# Patient Record
Sex: MALE | Race: OTHER | HISPANIC OR LATINO | Employment: UNEMPLOYED | URBAN - METROPOLITAN AREA
[De-identification: names, ages, dates, MRNs, and addresses within clinical notes are randomized per-mention and may not be internally consistent; named-entity substitution may affect disease eponyms.]

---

## 2018-09-01 ENCOUNTER — HOSPITAL ENCOUNTER (EMERGENCY)
Facility: HOSPITAL | Age: 48
End: 2018-09-01
Attending: EMERGENCY MEDICINE | Admitting: EMERGENCY MEDICINE
Payer: COMMERCIAL

## 2018-09-01 VITALS
RESPIRATION RATE: 16 BRPM | DIASTOLIC BLOOD PRESSURE: 84 MMHG | OXYGEN SATURATION: 99 % | HEART RATE: 60 BPM | TEMPERATURE: 98.1 F | SYSTOLIC BLOOD PRESSURE: 126 MMHG

## 2018-09-01 DIAGNOSIS — R45.851 SUICIDAL IDEATION: ICD-10-CM

## 2018-09-01 DIAGNOSIS — F19.10 DRUG ABUSE (HCC): ICD-10-CM

## 2018-09-01 DIAGNOSIS — F32.A DEPRESSION: Primary | ICD-10-CM

## 2018-09-01 LAB
AMPHETAMINES SERPL QL SCN: NEGATIVE
BARBITURATES UR QL: NEGATIVE
BENZODIAZ UR QL: NEGATIVE
COCAINE UR QL: POSITIVE
ETHANOL EXG-MCNC: 0 MG/DL
METHADONE UR QL: NEGATIVE
OPIATES UR QL SCN: POSITIVE
PCP UR QL: NEGATIVE
THC UR QL: POSITIVE

## 2018-09-01 PROCEDURE — 80307 DRUG TEST PRSMV CHEM ANLYZR: CPT

## 2018-09-01 PROCEDURE — 99285 EMERGENCY DEPT VISIT HI MDM: CPT

## 2018-09-01 PROCEDURE — 82075 ASSAY OF BREATH ETHANOL: CPT

## 2018-09-01 RX ORDER — RISPERIDONE 1 MG/1
1 TABLET, ORALLY DISINTEGRATING ORAL ONCE
Status: COMPLETED | OUTPATIENT
Start: 2018-09-01 | End: 2018-09-01

## 2018-09-01 RX ORDER — METHOCARBAMOL 500 MG/1
500 TABLET, FILM COATED ORAL ONCE
Status: COMPLETED | OUTPATIENT
Start: 2018-09-01 | End: 2018-09-01

## 2018-09-01 RX ORDER — FLUOXETINE 10 MG/1
20 CAPSULE ORAL DAILY
Status: DISCONTINUED | OUTPATIENT
Start: 2018-09-01 | End: 2018-09-01 | Stop reason: HOSPADM

## 2018-09-01 RX ORDER — METHOCARBAMOL 500 MG/1
500 TABLET, FILM COATED ORAL 4 TIMES DAILY
COMMUNITY
End: 2019-09-22 | Stop reason: ALTCHOICE

## 2018-09-01 RX ORDER — FLUOXETINE HYDROCHLORIDE 20 MG/1
20 CAPSULE ORAL DAILY
COMMUNITY
End: 2019-09-25 | Stop reason: HOSPADM

## 2018-09-01 RX ADMIN — RISPERIDONE 1 MG: 1 TABLET, ORALLY DISINTEGRATING ORAL at 02:00

## 2018-09-01 RX ADMIN — FLUOXETINE 20 MG: 10 CAPSULE ORAL at 10:01

## 2018-09-01 RX ADMIN — METHOCARBAMOL 500 MG: 500 TABLET ORAL at 10:01

## 2018-09-01 NOTE — ED NOTES
Insurance Authorization: [facility]  Phone call placed to [facility]  Phone number: [phone]  Spoke to JEN  3 days approved  Level of care: Inpatient psych  Review on 9/4/18  Authorization # na  EVS done and patient is eligible with Physical and Behavioral managed by managed care

## 2018-09-01 NOTE — ED NOTES
Patient has been moved to room 12  Patient belongings have been placed and locked in locker 12  Patient requesting apple juice and a blanket  Requests granted  Patient is resting comfortably in bed at this time         Lebron Boas, RN  09/01/18 0712

## 2018-09-01 NOTE — ED NOTES
Patient requesting food and drink  Patient provided with sandwich, pretzels and juice       Troy Deng RN  09/01/18 2330

## 2018-09-01 NOTE — ED NOTES
Patient instructed to urinate at this time  Patient gave sample       Lata Coombs RN  09/01/18 Dmitry Cooney RN  09/01/18 8847

## 2018-09-01 NOTE — EMTALA/ACUTE CARE TRANSFER
[facility]  [address] [id number]  Dept: [phone]      EMTALA TRANSFER CONSENT    NAME John Reveles                                         1970                              MRN [id number]    I have been informed of my rights regarding examination, treatment, and transfer   by Dr Brittany Kwan, *    Benefits: Continuity of care    Risks: Potential for delay in receiving treatment, Increased discomfort during transfer      Consent for Transfer:  I acknowledge that my medical condition has been evaluated and explained to me by the emergency department physician or other qualified medical person and/or my attending physician, who has recommended that I be transferred to the service of  Accepting Physician: Dr Marcial Fearing  at [address], [address] : [place]  The above potential benefits of such transfer, the potential risks associated with such transfer, and the probable risks of not being transferred have been explained to me, and I fully understand them  The doctor has explained that, in my case, the benefits of transfer outweigh the risks  I agree to be transferred  I authorize the performance of emergency medical procedures and treatments upon me in both transit and upon arrival at the receiving facility  Additionally, I authorize the release of any and all medical records to the receiving facility and request they be transported with me, if possible  I understand that the safest mode of transportation during a medical emergency is an ambulance and that the Hospital advocates the use of this mode of transport  Risks of traveling to the receiving facility by car, including absence of medical control, life sustaining equipment, such as oxygen, and medical personnel has been explained to me and I fully understand them      (NIDIA CORRECT BOX BELOW)  [  ]  I consent to the stated transfer and to be transported by ambulance/helicopter  [  ]  I consent to the stated transfer, but refuse transportation by ambulance and accept full responsibility for my transportation by car  I understand the risks of non-ambulance transfers and I exonerate the Hospital and its staff from any deterioration in my condition that results from this refusal     X___________________________________________    DATE  18  TIME________  Signature of patient or legally responsible individual signing on patient behalf           RELATIONSHIP TO PATIENT_________________________          Provider Certification    NAME Darby Cummings                                         1970                              MRN [id number]    A medical screening exam was performed on the above named patient  Based on the examination:    Condition Necessitating Transfer The primary encounter diagnosis was Depression  Diagnoses of Suicidal ideation and Drug abuse were also pertinent to this visit  Patient Condition: The patient has been stabilized such that within reasonable medical probability, no material deterioration of the patient condition or the condition of the unborn child(moy) is likely to result from the transfer    Reason for Transfer: Level of Care needed not available at this facility    Transfer Requirements: [address]   · [facility] available and qualified personnel available for treatment as acknowledged by    · Agreed to accept transfer and to provide appropriate medical treatment as acknowledged by       Dr Tommy Galeano   · Appropriate medical records of the examination and treatment of the patient are provided at the time of transfer   [address], [address] _______  · Transfer will be performed by qualified personnel from [facility]   and appropriate transfer equipment as required, including the use of necessary and appropriate life support measures      Provider Certification: I have examined the patient and explained the following risks and benefits of being transferred/refusing transfer to the patient/family:  General risk, such as traffic hazards, adverse weather conditions, rough terrain or turbulence, possible failure of equipment (including vehicle or aircraft), or consequences of actions of persons outside the control of the transport personnel      Based on these reasonable risks and benefits to the patient and/or the unborn child(moy), and based upon the information available at the time of the patients examination, I certify that the medical benefits reasonably to be expected from the provision of appropriate medical treatments at another medical facility outweigh the increasing risks, if any, to the individuals medical condition, and in the case of labor to the unborn child, from effecting the transfer      X____________________________________________ DATE 09/01/18        TIME_______      ORIGINAL - SEND TO MEDICAL RECORDS   COPY - SEND WITH PATIENT DURING TRANSFER

## 2018-09-01 NOTE — ED NOTES
Patients Accepted to [facility]   Accepting Doctor is Dr Jenniffer Traylor by [employer] @ 6:30pm

## 2018-09-01 NOTE — ED NOTES
Pt awake, asking for change of TV channel  Breakfast tray ordered for pt        Augie Matthews, RN  09/01/18 84 Sandra Humphrey RN  09/01/18 7864

## 2018-09-01 NOTE — ED PROVIDER NOTES
History  Chief Complaint   Patient presents with    Psychiatric Evaluation     pt reports he is stresssed and depressed and feels like killing himself, does not have a plan  states his fiance and son left him and it is hard for him to handle  denies HI, states he hears voices that tell him to kill himself  also states he used crack and took suboxone today  Patient presents for depression and suicidal thoughts  He reported hallucinations in triage but denied him for me currently  He states that he has a history of psychiatric disorders and he is under the care of a psychiatrist and outpatient therapy  He states that he has been taking his medications  States that he is currently going through a tough time at home  He states that his fiancee took his 3year-old and brought him to [facility]  He states that there is no direct custody of the child  It appears to be shared  He called the police but there is nothing that could be done at this time  He states that he has been struggling with this over the past few days and is now becoming more depressed and wants to harm himself  The patient states that he has a plan to throw himself out into traffic  History provided by:  Patient   used: No    Psychiatric Evaluation   Presenting symptoms: depression and suicidal thoughts    Presenting symptoms: no agitation and no self-mutilation    Degree of incapacity (severity): Moderate  Onset quality:  Gradual  Duration:  5 days  Timing:  Constant  Progression:  Worsening  Context: alcohol use, drug abuse and stressful life event    Treatment compliance: All of the time  Relieved by:  None tried  Worsened by:  Family interactions  Ineffective treatments:  None tried  Associated symptoms: feelings of worthlessness    Associated symptoms: no headaches    Risk factors: hx of mental illness        Prior to Admission Medications   Prescriptions Last Dose Informant Patient Reported? Taking? FLUoxetine (PROzac) 20 mg capsule   Yes Yes   Sig: Take 20 mg by mouth daily   RISPERIDONE PO   Yes Yes   Sig: Take 1 mg by mouth daily at bedtime   methocarbamol (ROBAXIN) 500 mg tablet   Yes Yes   Sig: Take 500 mg by mouth 4 (four) times a day      Facility-Administered Medications: None       History reviewed  No pertinent past medical history  Past Surgical History:   Procedure Laterality Date    ELBOW SURGERY         History reviewed  No pertinent family history  I have reviewed and agree with the history as documented  Social History   Substance Use Topics    Smoking status: Current Every Day Smoker     Packs/day: 0 50    Smokeless tobacco: Not on file    Alcohol use No        Review of Systems   Skin: Negative for color change, pallor, rash and wound  Allergic/Immunologic: Negative for immunocompromised state  Neurological: Negative for headaches  Psychiatric/Behavioral: Positive for suicidal ideas  Negative for agitation and self-injury  All other systems reviewed and are negative  Physical Exam  Physical Exam   Constitutional: He is oriented to person, place, and time  He appears well-developed and well-nourished  No distress  HENT:   Head: Normocephalic and atraumatic  Mouth/Throat: Oropharynx is clear and moist    Eyes: Conjunctivae and EOM are normal  Pupils are equal, round, and reactive to light  Right eye exhibits no discharge  Left eye exhibits no discharge  No scleral icterus  Cardiovascular: Normal rate, regular rhythm, normal heart sounds and intact distal pulses  Exam reveals no friction rub  No murmur heard  Pulmonary/Chest: Effort normal and breath sounds normal  No stridor  No respiratory distress  He has no wheezes  He has no rales  Musculoskeletal: He exhibits no edema, tenderness or deformity  Neurological: He is alert and oriented to person, place, and time  Skin: Skin is warm and dry  He is not diaphoretic     Psychiatric: He has a normal mood and affect  His speech is normal and behavior is normal  He is not actively hallucinating  Thought content is not paranoid and not delusional  Cognition and memory are not impaired  He expresses suicidal ideation  He expresses no homicidal ideation  He expresses suicidal plans  He expresses no homicidal plans  He is attentive  Nursing note and vitals reviewed  Vital Signs  ED Triage Vitals   Temperature Pulse Respirations Blood Pressure SpO2   09/01/18 0059 09/01/18 0059 09/01/18 0059 09/01/18 0101 09/01/18 0059   (!) 97 4 °F (36 3 °C) 99 16 147/88 98 %      Temp Source Heart Rate Source Patient Position - Orthostatic VS BP Location FiO2 (%)   09/01/18 0059 09/01/18 0059 09/01/18 0059 09/01/18 0059 --   Temporal Monitor Sitting Right arm       Pain Score       --                  Vitals:    09/01/18 0059 09/01/18 0101   BP:  147/88   Pulse: 99    Patient Position - Orthostatic VS: Sitting        Visual Acuity      ED Medications  Medications   risperiDONE (RisperDAL M-TABS) dispersible tablet 1 mg (1 mg Oral Given 9/1/18 0200)       Diagnostic Studies  Results Reviewed     Procedure Component Value Units Date/Time    Rapid drug screen, urine [[id number]]  (Abnormal) Collected:  09/01/18 0233    Lab Status:  Final result Specimen:  Urine from Urine, Other Updated:  09/01/18 0253     Amph/Meth UR Negative     Barbiturate Ur Negative     Benzodiazepine Urine Negative     Cocaine Urine Positive (A)     Methadone Urine Negative     Opiate Urine Positive (A)     PCP Ur Negative     THC Urine Positive (A)    Narrative:         Presumptive report  If requested, specimen will be sent to reference lab for confirmation  FOR MEDICAL PURPOSES ONLY  IF CONFIRMATION NEEDED PLEASE CONTACT THE LAB WITHIN 5 DAYS      Drug Screen Cutoff Levels:  AMPHETAMINE/METHAMPHETAMINES  1000 ng/mL  BARBITURATES     200 ng/mL  BENZODIAZEPINES     200 ng/mL  COCAINE      300 ng/mL  METHADONE      300 ng/mL  OPIATES      300 ng/mL  PHENCYCLIDINE     25 ng/mL  THC       50 ng/mL    POCT alcohol breath test [[id number]]  (Normal) Resulted:  09/01/18 0121    Lab Status:  Final result Updated:  09/01/18 0121     EXTBreath Alcohol 0 000                 No orders to display              Procedures  Procedures       Phone Contacts  ED Phone Contact    ED Course                               MDM  Number of Diagnoses or Management Options  Depression: new and requires workup  Drug abuse: new and requires workup  Suicidal ideation: new and requires workup  Diagnosis management comments: 1:43 AM  Patient is medically cleared  Will have crisis evaluate  Amount and/or Complexity of Data Reviewed  Clinical lab tests: ordered and reviewed  Tests in the medicine section of CPT®: reviewed and ordered  Review and summarize past medical records: yes    Patient Progress  Patient progress: stable    CritCare Time    Disposition  Final diagnoses:   Depression   Suicidal ideation   Drug abuse     Time reflects when diagnosis was documented in both MDM as applicable and the Disposition within this note     Time User Action Codes Description Comment    9/1/2018  3:41 AM Kaya Mckeon Add [F32 9] Depression     9/1/2018  3:41 AM Kaya Mckeon Add [R45 851] Suicidal ideation     9/1/2018  3:41 AM Gee Gomez Add [F19 10] Drug abuse       ED Disposition     ED Disposition Condition Comment    Transfer to Another [facility] [address] should be transferred out to psychiatric facility  Follow-up Information    None         Patient's Medications   Discharge Prescriptions    No medications on file     No discharge procedures on file      ED Provider  Electronically Signed by           Anabela Bennett DO  09/01/18 4306

## 2019-09-22 ENCOUNTER — APPOINTMENT (EMERGENCY)
Dept: CT IMAGING | Facility: HOSPITAL | Age: 49
DRG: 720 | End: 2019-09-22
Payer: COMMERCIAL

## 2019-09-22 ENCOUNTER — HOSPITAL ENCOUNTER (INPATIENT)
Facility: HOSPITAL | Age: 49
LOS: 3 days | Discharge: HOME | DRG: 720 | End: 2019-09-25
Attending: EMERGENCY MEDICINE | Admitting: FAMILY MEDICINE
Payer: COMMERCIAL

## 2019-09-22 ENCOUNTER — APPOINTMENT (EMERGENCY)
Dept: RADIOLOGY | Facility: HOSPITAL | Age: 49
DRG: 720 | End: 2019-09-22
Payer: COMMERCIAL

## 2019-09-22 DIAGNOSIS — R50.9 FEVER: Primary | ICD-10-CM

## 2019-09-22 DIAGNOSIS — F11.20 HEROIN USE DISORDER, SEVERE, DEPENDENCE (HCC): ICD-10-CM

## 2019-09-22 DIAGNOSIS — J18.9 LUNG INFECTION: ICD-10-CM

## 2019-09-22 DIAGNOSIS — F11.10 HEROIN ABUSE (HCC): ICD-10-CM

## 2019-09-22 DIAGNOSIS — B19.20 HEPATITIS C: ICD-10-CM

## 2019-09-22 DIAGNOSIS — L03.114 CELLULITIS OF LEFT FOREARM: ICD-10-CM

## 2019-09-22 DIAGNOSIS — F19.10 SUBSTANCE ABUSE (HCC): ICD-10-CM

## 2019-09-22 PROBLEM — M54.9 CHRONIC BILATERAL BACK PAIN: Status: ACTIVE | Noted: 2017-06-05

## 2019-09-22 PROBLEM — F19.239: Status: ACTIVE | Noted: 2019-09-22

## 2019-09-22 PROBLEM — G89.29 CHRONIC BILATERAL BACK PAIN: Status: ACTIVE | Noted: 2017-06-05

## 2019-09-22 PROBLEM — G47.30 SLEEP APNEA: Status: ACTIVE | Noted: 2017-06-05

## 2019-09-22 PROBLEM — B18.2 CHRONIC HEPATITIS C WITHOUT HEPATIC COMA (HCC): Chronic | Status: ACTIVE | Noted: 2017-06-05

## 2019-09-22 PROBLEM — F19.939: Status: ACTIVE | Noted: 2019-09-22

## 2019-09-22 PROBLEM — K08.9 DENTAL DISEASE: Status: ACTIVE | Noted: 2017-06-05

## 2019-09-22 PROBLEM — K74.00 FIBROSIS OF LIVER: Status: ACTIVE | Noted: 2017-09-13

## 2019-09-22 PROBLEM — F33.1 MODERATE EPISODE OF RECURRENT MAJOR DEPRESSIVE DISORDER (HCC): Status: ACTIVE | Noted: 2017-09-05

## 2019-09-22 PROBLEM — G89.29 CHRONIC BILATERAL BACK PAIN: Chronic | Status: ACTIVE | Noted: 2017-06-05

## 2019-09-22 PROBLEM — B18.2 CHRONIC HEPATITIS C WITHOUT HEPATIC COMA (HCC): Status: ACTIVE | Noted: 2017-06-05

## 2019-09-22 PROBLEM — Z59.9 FINANCIAL PROBLEMS: Chronic | Status: ACTIVE | Noted: 2017-06-05

## 2019-09-22 PROBLEM — K74.00 FIBROSIS OF LIVER: Chronic | Status: ACTIVE | Noted: 2017-09-13

## 2019-09-22 PROBLEM — F33.1 MODERATE EPISODE OF RECURRENT MAJOR DEPRESSIVE DISORDER (HCC): Chronic | Status: ACTIVE | Noted: 2017-09-05

## 2019-09-22 PROBLEM — Z59.9 FINANCIAL PROBLEMS: Status: ACTIVE | Noted: 2017-06-05

## 2019-09-22 PROBLEM — T73.2XXA FATIGUE DUE TO EXPOSURE: Chronic | Status: ACTIVE | Noted: 2017-06-05

## 2019-09-22 PROBLEM — A41.9 SEPSIS (HCC): Status: ACTIVE | Noted: 2019-09-22

## 2019-09-22 PROBLEM — M54.9 CHRONIC BILATERAL BACK PAIN: Chronic | Status: ACTIVE | Noted: 2017-06-05

## 2019-09-22 PROBLEM — K08.9 DENTAL DISEASE: Chronic | Status: ACTIVE | Noted: 2017-06-05

## 2019-09-22 PROBLEM — F11.11 HEROIN USE DISORDER, MILD, IN SUSTAINED REMISSION (HCC): Chronic | Status: ACTIVE | Noted: 2017-08-16

## 2019-09-22 PROBLEM — F11.11 HEROIN USE DISORDER, MILD, IN SUSTAINED REMISSION (HCC): Status: ACTIVE | Noted: 2017-08-16

## 2019-09-22 PROBLEM — G47.30 SLEEP APNEA: Chronic | Status: ACTIVE | Noted: 2017-06-05

## 2019-09-22 PROBLEM — T73.2XXA FATIGUE DUE TO EXPOSURE: Status: ACTIVE | Noted: 2017-06-05

## 2019-09-22 LAB
ALBUMIN SERPL BCP-MCNC: 4.1 G/DL (ref 3–5.2)
ALP SERPL-CCNC: 75 U/L (ref 43–122)
ALT SERPL W P-5'-P-CCNC: 16 U/L (ref 9–52)
AMPHETAMINES SERPL QL SCN: NEGATIVE
ANION GAP SERPL CALCULATED.3IONS-SCNC: 8 MMOL/L (ref 5–14)
AST SERPL W P-5'-P-CCNC: 23 U/L (ref 17–59)
ATRIAL RATE: 88 BPM
BACTERIA UR QL AUTO: ABNORMAL /HPF
BARBITURATES UR QL: NEGATIVE
BASOPHILS # BLD AUTO: 0.1 THOUSANDS/ΜL (ref 0–0.1)
BASOPHILS NFR BLD AUTO: 1 % (ref 0–1)
BENZODIAZ UR QL: NEGATIVE
BILIRUB SERPL-MCNC: 2.1 MG/DL
BILIRUB UR QL STRIP: ABNORMAL
BUN SERPL-MCNC: 16 MG/DL (ref 5–25)
CALCIUM SERPL-MCNC: 9 MG/DL (ref 8.4–10.2)
CHLORIDE SERPL-SCNC: 98 MMOL/L (ref 97–108)
CK SERPL-CCNC: 63 U/L (ref 55–170)
CLARITY UR: CLEAR
CO2 SERPL-SCNC: 29 MMOL/L (ref 22–30)
COCAINE UR QL: NEGATIVE
COLOR UR: ABNORMAL
CREAT SERPL-MCNC: 0.95 MG/DL (ref 0.7–1.5)
EOSINOPHIL # BLD AUTO: 0.1 THOUSAND/ΜL (ref 0–0.4)
EOSINOPHIL NFR BLD AUTO: 0 % (ref 0–6)
ERYTHROCYTE [DISTWIDTH] IN BLOOD BY AUTOMATED COUNT: 13.2 %
ETHANOL SERPL-MCNC: <10 MG/DL (ref 0–10)
GFR SERPL CREATININE-BSD FRML MDRD: 95 ML/MIN/1.73SQ M
GLUCOSE SERPL-MCNC: 121 MG/DL (ref 70–99)
GLUCOSE UR STRIP-MCNC: NEGATIVE MG/DL
HCT VFR BLD AUTO: 47.2 % (ref 41–53)
HGB BLD-MCNC: 16.2 G/DL (ref 13.5–17.5)
HGB UR QL STRIP.AUTO: NEGATIVE
KETONES UR STRIP-MCNC: ABNORMAL MG/DL
LACTATE SERPL-SCNC: 1.6 MMOL/L (ref 0.7–2)
LEUKOCYTE ESTERASE UR QL STRIP: NEGATIVE
LIPASE SERPL-CCNC: 25 U/L (ref 23–300)
LYMPHOCYTES # BLD AUTO: 1.9 THOUSANDS/ΜL (ref 0.5–4)
LYMPHOCYTES NFR BLD AUTO: 15 % (ref 25–45)
MAGNESIUM SERPL-MCNC: 1.9 MG/DL (ref 1.6–2.3)
MCH RBC QN AUTO: 31.9 PG (ref 26–34)
MCHC RBC AUTO-ENTMCNC: 34.4 G/DL (ref 31–36)
MCV RBC AUTO: 93 FL (ref 80–100)
METHADONE UR QL: NEGATIVE
MONOCYTES # BLD AUTO: 0.8 THOUSAND/ΜL (ref 0.2–0.9)
MONOCYTES NFR BLD AUTO: 6 % (ref 1–10)
NEUTROPHILS # BLD AUTO: 9.9 THOUSANDS/ΜL (ref 1.8–7.8)
NEUTS SEG NFR BLD AUTO: 78 % (ref 45–65)
NITRITE UR QL STRIP: NEGATIVE
NON-SQ EPI CELLS URNS QL MICRO: ABNORMAL /HPF
NT-PROBNP SERPL-MCNC: 137 PG/ML (ref 0–299)
OPIATES UR QL SCN: POSITIVE
P AXIS: 49 DEGREES
PCP UR QL: NEGATIVE
PH UR STRIP.AUTO: 6 [PH]
PLATELET # BLD AUTO: 204 THOUSANDS/UL (ref 150–450)
PMV BLD AUTO: 8.6 FL (ref 8.9–12.7)
POTASSIUM SERPL-SCNC: 4.4 MMOL/L (ref 3.6–5)
PR INTERVAL: 122 MS
PROT SERPL-MCNC: 9 G/DL (ref 5.9–8.4)
PROT UR STRIP-MCNC: ABNORMAL MG/DL
QRS AXIS: 57 DEGREES
QRSD INTERVAL: 82 MS
QT INTERVAL: 370 MS
QTC INTERVAL: 447 MS
RBC # BLD AUTO: 5.08 MILLION/UL (ref 4.5–5.9)
RBC #/AREA URNS AUTO: ABNORMAL /HPF
SODIUM SERPL-SCNC: 135 MMOL/L (ref 137–147)
SP GR UR STRIP.AUTO: 1.01 (ref 1–1.04)
T WAVE AXIS: 60 DEGREES
THC UR QL: POSITIVE
TROPONIN I SERPL-MCNC: <0.01 NG/ML (ref 0–0.03)
TSH SERPL DL<=0.05 MIU/L-ACNC: 0.67 UIU/ML (ref 0.47–4.68)
UROBILINOGEN UA: 12 MG/DL
VENTRICULAR RATE: 88 BPM
WBC # BLD AUTO: 12.7 THOUSAND/UL (ref 4.5–11)
WBC #/AREA URNS AUTO: ABNORMAL /HPF

## 2019-09-22 PROCEDURE — 85025 COMPLETE CBC W/AUTO DIFF WBC: CPT | Performed by: EMERGENCY MEDICINE

## 2019-09-22 PROCEDURE — 84484 ASSAY OF TROPONIN QUANT: CPT | Performed by: PHYSICIAN ASSISTANT

## 2019-09-22 PROCEDURE — 93010 ELECTROCARDIOGRAM REPORT: CPT | Performed by: INTERNAL MEDICINE

## 2019-09-22 PROCEDURE — 84443 ASSAY THYROID STIM HORMONE: CPT | Performed by: PHYSICIAN ASSISTANT

## 2019-09-22 PROCEDURE — 80320 DRUG SCREEN QUANTALCOHOLS: CPT | Performed by: EMERGENCY MEDICINE

## 2019-09-22 PROCEDURE — 87040 BLOOD CULTURE FOR BACTERIA: CPT | Performed by: EMERGENCY MEDICINE

## 2019-09-22 PROCEDURE — 87806 HIV AG W/HIV1&2 ANTB W/OPTIC: CPT | Performed by: PHYSICIAN ASSISTANT

## 2019-09-22 PROCEDURE — 93005 ELECTROCARDIOGRAM TRACING: CPT

## 2019-09-22 PROCEDURE — 80074 ACUTE HEPATITIS PANEL: CPT | Performed by: PHYSICIAN ASSISTANT

## 2019-09-22 PROCEDURE — 94640 AIRWAY INHALATION TREATMENT: CPT

## 2019-09-22 PROCEDURE — 84484 ASSAY OF TROPONIN QUANT: CPT | Performed by: EMERGENCY MEDICINE

## 2019-09-22 PROCEDURE — 96360 HYDRATION IV INFUSION INIT: CPT

## 2019-09-22 PROCEDURE — 80307 DRUG TEST PRSMV CHEM ANLYZR: CPT | Performed by: EMERGENCY MEDICINE

## 2019-09-22 PROCEDURE — 81001 URINALYSIS AUTO W/SCOPE: CPT | Performed by: EMERGENCY MEDICINE

## 2019-09-22 PROCEDURE — 83690 ASSAY OF LIPASE: CPT | Performed by: EMERGENCY MEDICINE

## 2019-09-22 PROCEDURE — 71046 X-RAY EXAM CHEST 2 VIEWS: CPT

## 2019-09-22 PROCEDURE — 83880 ASSAY OF NATRIURETIC PEPTIDE: CPT | Performed by: EMERGENCY MEDICINE

## 2019-09-22 PROCEDURE — 83605 ASSAY OF LACTIC ACID: CPT | Performed by: EMERGENCY MEDICINE

## 2019-09-22 PROCEDURE — 36415 COLL VENOUS BLD VENIPUNCTURE: CPT | Performed by: EMERGENCY MEDICINE

## 2019-09-22 PROCEDURE — 99285 EMERGENCY DEPT VISIT HI MDM: CPT

## 2019-09-22 PROCEDURE — 73090 X-RAY EXAM OF FOREARM: CPT

## 2019-09-22 PROCEDURE — 82550 ASSAY OF CK (CPK): CPT | Performed by: EMERGENCY MEDICINE

## 2019-09-22 PROCEDURE — 99223 1ST HOSP IP/OBS HIGH 75: CPT | Performed by: PHYSICIAN ASSISTANT

## 2019-09-22 PROCEDURE — 71250 CT THORAX DX C-: CPT

## 2019-09-22 PROCEDURE — 84145 PROCALCITONIN (PCT): CPT | Performed by: PHYSICIAN ASSISTANT

## 2019-09-22 PROCEDURE — 80053 COMPREHEN METABOLIC PANEL: CPT | Performed by: EMERGENCY MEDICINE

## 2019-09-22 PROCEDURE — 83735 ASSAY OF MAGNESIUM: CPT | Performed by: EMERGENCY MEDICINE

## 2019-09-22 PROCEDURE — 87081 CULTURE SCREEN ONLY: CPT | Performed by: PHYSICIAN ASSISTANT

## 2019-09-22 PROCEDURE — 99285 EMERGENCY DEPT VISIT HI MDM: CPT | Performed by: EMERGENCY MEDICINE

## 2019-09-22 PROCEDURE — 96361 HYDRATE IV INFUSION ADD-ON: CPT

## 2019-09-22 PROCEDURE — 87449 NOS EACH ORGANISM AG IA: CPT | Performed by: PHYSICIAN ASSISTANT

## 2019-09-22 RX ORDER — NICOTINE 21 MG/24HR
1 PATCH, TRANSDERMAL 24 HOURS TRANSDERMAL DAILY
Status: DISCONTINUED | OUTPATIENT
Start: 2019-09-23 | End: 2019-09-25 | Stop reason: HOSPADM

## 2019-09-22 RX ORDER — ACETAMINOPHEN 325 MG/1
650 TABLET ORAL EVERY 6 HOURS SCHEDULED
Status: DISCONTINUED | OUTPATIENT
Start: 2019-09-23 | End: 2019-09-25 | Stop reason: HOSPADM

## 2019-09-22 RX ORDER — ONDANSETRON 2 MG/ML
4 INJECTION INTRAMUSCULAR; INTRAVENOUS EVERY 6 HOURS PRN
Status: DISCONTINUED | OUTPATIENT
Start: 2019-09-22 | End: 2019-09-22

## 2019-09-22 RX ORDER — IBUPROFEN 600 MG/1
600 TABLET ORAL EVERY 6 HOURS PRN
Status: DISCONTINUED | OUTPATIENT
Start: 2019-09-22 | End: 2019-09-24

## 2019-09-22 RX ORDER — ONDANSETRON 2 MG/ML
4 INJECTION INTRAMUSCULAR; INTRAVENOUS EVERY 6 HOURS PRN
Status: DISCONTINUED | OUTPATIENT
Start: 2019-09-22 | End: 2019-09-25 | Stop reason: HOSPADM

## 2019-09-22 RX ORDER — DIAZEPAM 5 MG/ML
5 INJECTION, SOLUTION INTRAMUSCULAR; INTRAVENOUS EVERY 6 HOURS PRN
Status: DISCONTINUED | OUTPATIENT
Start: 2019-09-22 | End: 2019-09-25 | Stop reason: HOSPADM

## 2019-09-22 RX ORDER — CEFTRIAXONE 1 G/50ML
1000 INJECTION, SOLUTION INTRAVENOUS EVERY 24 HOURS
Status: DISCONTINUED | OUTPATIENT
Start: 2019-09-23 | End: 2019-09-25 | Stop reason: HOSPADM

## 2019-09-22 RX ORDER — SODIUM CHLORIDE 9 MG/ML
100 INJECTION, SOLUTION INTRAVENOUS CONTINUOUS
Status: DISCONTINUED | OUTPATIENT
Start: 2019-09-22 | End: 2019-09-25 | Stop reason: HOSPADM

## 2019-09-22 RX ORDER — CEFEPIME HYDROCHLORIDE 2 G/50ML
2000 INJECTION, SOLUTION INTRAVENOUS EVERY 12 HOURS
Status: DISCONTINUED | OUTPATIENT
Start: 2019-09-23 | End: 2019-09-22

## 2019-09-22 RX ORDER — IPRATROPIUM BROMIDE AND ALBUTEROL SULFATE 2.5; .5 MG/3ML; MG/3ML
3 SOLUTION RESPIRATORY (INHALATION) ONCE
Status: COMPLETED | OUTPATIENT
Start: 2019-09-22 | End: 2019-09-22

## 2019-09-22 RX ORDER — LOPERAMIDE HYDROCHLORIDE 2 MG/1
4 CAPSULE ORAL 3 TIMES DAILY PRN
Status: DISCONTINUED | OUTPATIENT
Start: 2019-09-22 | End: 2019-09-25 | Stop reason: HOSPADM

## 2019-09-22 RX ORDER — VANCOMYCIN HYDROCHLORIDE 1 G/200ML
15 INJECTION, SOLUTION INTRAVENOUS EVERY 12 HOURS
Status: DISCONTINUED | OUTPATIENT
Start: 2019-09-22 | End: 2019-09-22

## 2019-09-22 RX ORDER — CLONIDINE HYDROCHLORIDE 0.1 MG/1
0.1 TABLET ORAL EVERY 8 HOURS SCHEDULED
Status: DISCONTINUED | OUTPATIENT
Start: 2019-09-22 | End: 2019-09-25 | Stop reason: HOSPADM

## 2019-09-22 RX ORDER — HYDROXYZINE 50 MG/1
50 TABLET, FILM COATED ORAL EVERY 6 HOURS PRN
Status: DISCONTINUED | OUTPATIENT
Start: 2019-09-22 | End: 2019-09-25 | Stop reason: HOSPADM

## 2019-09-22 RX ORDER — LEVALBUTEROL 1.25 MG/.5ML
1.25 SOLUTION, CONCENTRATE RESPIRATORY (INHALATION) EVERY 6 HOURS PRN
Status: DISCONTINUED | OUTPATIENT
Start: 2019-09-22 | End: 2019-09-25 | Stop reason: HOSPADM

## 2019-09-22 RX ORDER — MAGNESIUM SULFATE HEPTAHYDRATE 40 MG/ML
2 INJECTION, SOLUTION INTRAVENOUS ONCE
Status: COMPLETED | OUTPATIENT
Start: 2019-09-22 | End: 2019-09-23

## 2019-09-22 RX ORDER — CEFTRIAXONE 1 G/50ML
1000 INJECTION, SOLUTION INTRAVENOUS ONCE
Status: COMPLETED | OUTPATIENT
Start: 2019-09-22 | End: 2019-09-22

## 2019-09-22 RX ORDER — VANCOMYCIN HYDROCHLORIDE 1 G/200ML
15 INJECTION, SOLUTION INTRAVENOUS EVERY 12 HOURS
Status: DISCONTINUED | OUTPATIENT
Start: 2019-09-23 | End: 2019-09-22

## 2019-09-22 RX ORDER — IBUPROFEN 400 MG/1
400 TABLET ORAL ONCE
Status: COMPLETED | OUTPATIENT
Start: 2019-09-22 | End: 2019-09-22

## 2019-09-22 RX ORDER — ACETAMINOPHEN 325 MG/1
650 TABLET ORAL ONCE
Status: COMPLETED | OUTPATIENT
Start: 2019-09-22 | End: 2019-09-22

## 2019-09-22 RX ADMIN — CEFTRIAXONE 1000 MG: 1 INJECTION, SOLUTION INTRAVENOUS at 21:26

## 2019-09-22 RX ADMIN — SODIUM CHLORIDE 1000 ML: 0.9 INJECTION, SOLUTION INTRAVENOUS at 19:45

## 2019-09-22 RX ADMIN — VANCOMYCIN HYDROCHLORIDE 1250 MG: 1 INJECTION, POWDER, LYOPHILIZED, FOR SOLUTION INTRAVENOUS at 22:51

## 2019-09-22 RX ADMIN — IPRATROPIUM BROMIDE AND ALBUTEROL SULFATE 3 ML: 2.5; .5 SOLUTION RESPIRATORY (INHALATION) at 19:43

## 2019-09-22 RX ADMIN — ACETAMINOPHEN 650 MG: 325 TABLET ORAL at 23:17

## 2019-09-22 RX ADMIN — SODIUM CHLORIDE 1000 ML: 0.9 INJECTION, SOLUTION INTRAVENOUS at 21:12

## 2019-09-22 RX ADMIN — CLONIDINE HYDROCHLORIDE 0.1 MG: 0.1 TABLET ORAL at 23:17

## 2019-09-22 RX ADMIN — IBUPROFEN 400 MG: 400 TABLET ORAL at 20:40

## 2019-09-22 RX ADMIN — SODIUM CHLORIDE 125 ML/HR: 0.9 INJECTION, SOLUTION INTRAVENOUS at 22:52

## 2019-09-22 RX ADMIN — ACETAMINOPHEN 650 MG: 325 TABLET ORAL at 19:58

## 2019-09-22 NOTE — ED PROVIDER NOTES
History  Chief Complaint   Patient presents with    Drug Problem     IV heroin use, 4 bags a day, last used this morning  K2 using, last smoked 20 min PTA  Patient states he is homeless, hasn't eaten or showered in a long time, wants rehab  States he "sometimes" feels suicidal, doesn't have SI at this time  Denies HI, AH, VH  Patient is a 72-year-old male with a history of hepatitis coming in today with reports of a cyst asking for assistance for his drug habits  Patient states he typically uses 3 or 4 bags of heroin IV a day  He used his 3-4 bag this morning and has been smoking K2 throughout the day  He states for the past 1-2 days he has been feeling generalized fatigue, weakness, nauseousness as well as abdominal discomfort  He has no vomiting, diarrhea, melena, bright red blood per rectum  He does report feeling short of breath without any hemoptysis or hematemesis  He has no known fevers or chills  He states that he is homeless  He is asking for help for his addiction  He states last time he was drug free was approximately 5 years ago    Patient is not suicidal, homicidal, paranoid or adding active auditory and/or visual hallucinations      History provided by:  Patient   used: No    Drug Problem   Location:  Heroin and K2  Severity:  Moderate  Onset quality:  Gradual  Duration: Years  Timing:  Constant  Chronicity:  New  Associated symptoms: abdominal pain, cough, nausea and wheezing    Associated symptoms: no chest pain, no ear pain, no fever, no rash, no shortness of breath, no sore throat and no vomiting        Prior to Admission Medications   Prescriptions Last Dose Informant Patient Reported? Taking?    FLUoxetine (PROzac) 20 mg capsule Past Week at Unknown time  Yes Yes   Sig: Take 20 mg by mouth daily      Facility-Administered Medications: None       Past Medical History:   Diagnosis Date    [facility]     Hepatitis C virus     Sleep apnea        Past Surgical History:   Procedure Laterality Date    CYST REMOVAL      ELBOW SURGERY         History reviewed  No pertinent family history  I have reviewed and agree with the history as documented  Social History     Tobacco Use    Smoking status: Current Every Day Smoker     Packs/day: 0 50     Types: Cigarettes    Smokeless tobacco: Never Used   Substance Use Topics    Alcohol use: No    Drug use: Yes     Types: Heroin     Comment: k2        Review of Systems   Constitutional: Negative for diaphoresis and fever  HENT: Negative for ear pain and sore throat  Eyes: Negative for visual disturbance  Respiratory: Positive for cough and wheezing  Negative for chest tightness and shortness of breath  Cardiovascular: Negative for chest pain and palpitations  Gastrointestinal: Positive for abdominal pain and nausea  Negative for vomiting  Genitourinary: Negative for difficulty urinating and dysuria  Musculoskeletal: Negative for back pain and neck pain  Skin: Negative for rash  Neurological: Negative for weakness  Psychiatric/Behavioral: Negative for confusion  All other systems reviewed and are negative  Physical Exam  Physical Exam   Constitutional: He is oriented to person, place, and time  He appears well-developed  No distress  Patient appears older than stated age  He is very disheveled appearing  HENT:   Head: Normocephalic and atraumatic  Dry mucous membranes  Patient maintaining airway maintaining secretions   Eyes: Pupils are equal, round, and reactive to light  Conjunctivae and EOM are normal    Neck: Normal range of motion  Neck supple  Cardiovascular: Regular rhythm, normal heart sounds and intact distal pulses  Tachycardia present  No murmur heard  Pulses:       Radial pulses are 2+ on the right side, and 2+ on the left side  Dorsalis pedis pulses are 2+ on the right side, and 2+ on the left side  Pulmonary/Chest: Effort normal  No stridor   No respiratory distress  He has wheezes  He has rales  Abdominal: Soft  Bowel sounds are normal  He exhibits no distension  There is no tenderness  Musculoskeletal: Normal range of motion  He exhibits no edema  Noted left lateral distal forearm with erythema and swelling  This is concerned that abscess  There is no for pointing  It is fluctuant mobile  Is mildly tender  There is erythema and streaking  Neurological: He is alert and oriented to person, place, and time  No cranial nerve deficit or sensory deficit  GCS eye subscore is 4  GCS verbal subscore is 5  GCS motor subscore is 6  No slurred speech  No facial asymmetry  No ataxia  Skin: Skin is warm  Capillary refill takes less than 2 seconds  He is not diaphoretic  Psychiatric: He has a normal mood and affect  His behavior is normal  Thought content normal    Nursing note and vitals reviewed        Vital Signs  ED Triage Vitals [09/22/19 1904]   Temperature Pulse Respirations Blood Pressure SpO2   100 °F (37 8 °C) (!) 110 22 107/62 93 %      Temp Source Heart Rate Source Patient Position - Orthostatic VS BP Location FiO2 (%)   Tympanic Monitor Sitting Left arm --      Pain Score       9           Vitals:    09/22/19 2030 09/22/19 2045 09/22/19 2100 09/22/19 2115   BP:  109/75 109/63    Pulse: 92 98 83 85   Patient Position - Orthostatic VS: Lying Lying           Visual Acuity      ED Medications  Medications   sodium chloride 0 9 % bolus 1,000 mL (1,000 mL Intravenous New Bag 9/22/19 2112)   vancomycin (VANCOCIN) 1,250 mg in sodium chloride 0 9 % 250 mL IVPB (has no administration in time range)   cefTRIAXone (ROCEPHIN) IVPB (premix) 1,000 mg (1,000 mg Intravenous New Bag 9/22/19 2126)   sodium chloride 0 9 % bolus 1,000 mL (0 mL Intravenous Stopped 9/22/19 2111)   ipratropium-albuterol (DUO-NEB) 0 5-2 5 mg/3 mL inhalation solution 3 mL (3 mL Nebulization Given 9/22/19 1943)   acetaminophen (TYLENOL) tablet 650 mg (650 mg Oral Given 9/22/19 1958)   ibuprofen (MOTRIN) tablet 400 mg (400 mg Oral Given 9/22/19 2040)       Diagnostic Studies  Results Reviewed     Procedure Component Value Units Date/Time    Blood culture #2 [[id number]] Collected:  09/22/19 2102    Lab Status: In process Specimen:  Blood from Arm, Right Updated:  09/22/19 2106    Blood culture #1 [[id number]] Collected:  09/22/19 2102    Lab Status: In process Specimen:  Blood from Arm, Right Updated:  09/22/19 2106    Troponin I [[id number]]  (Normal) Collected:  09/22/19 1933    Lab Status:  Final result Specimen:  Blood from Arm, Right Updated:  09/22/19 2004     Troponin I <0 01 ng/mL     Rapid drug screen, urine [[id number]]  (Abnormal) Collected:  09/22/19 1932    Lab Status:  Final result Specimen:  Urine, Clean Catch Updated:  09/22/19 2002     Amph/Meth UR Negative     Barbiturate Ur Negative     Benzodiazepine Urine Negative     Cocaine Urine Negative     Methadone Urine Negative     Opiate Urine Positive     PCP Ur Negative     THC Urine Positive    Narrative:       Presumptive report  If requested, specimen will be sent to reference lab for confirmation  FOR MEDICAL PURPOSES ONLY  IF CONFIRMATION NEEDED PLEASE CONTACT THE LAB WITHIN 5 DAYS      Drug Screen Cutoff Levels:  AMPHETAMINE/METHAMPHETAMINES  1000 ng/mL  BARBITURATES     200 ng/mL  BENZODIAZEPINES     200 ng/mL  COCAINE      300 ng/mL  METHADONE      300 ng/mL  OPIATES      300 ng/mL  PHENCYCLIDINE     25 ng/mL  THC       50 ng/mL      B-type natriuretic peptide [[id number]]  (Normal) Collected:  09/22/19 1933    Lab Status:  Final result Specimen:  Blood from Arm, Right Updated:  09/22/19 2000     NT-proBNP 137 pg/mL     Ethanol [[id number]]  (Normal) Collected:  09/22/19 1933    Lab Status:  Final result Specimen:  Blood from Arm, Right Updated:  09/22/19 1952     Ethanol Lvl <10 mg/dL     Magnesium [[id number]]  (Normal) Collected:  09/22/19 1933    Lab Status:  Final result Specimen:  Blood from Arm, Right Updated: 09/22/19 1952     Magnesium 1 9 mg/dL     Narrative:       Hemolysis    Lipase [[id number]]  (Normal) Collected:  09/22/19 1933    Lab Status:  Final result Specimen:  Blood from Arm, Right Updated:  09/22/19 1952     Lipase 25 u/L     Narrative:       Hemolysis    Comprehensive metabolic panel [[id number]]  (Abnormal) Collected:  09/22/19 1933    Lab Status:  Final result Specimen:  Blood from Arm, Right Updated:  09/22/19 1952     Sodium 135 mmol/L      Potassium 4 4 mmol/L      Chloride 98 mmol/L      CO2 29 mmol/L      ANION GAP 8 mmol/L      BUN 16 mg/dL      Creatinine 0 95 mg/dL      Glucose 121 mg/dL      Calcium 9 0 mg/dL      AST 23 U/L      ALT 16 U/L      Alkaline Phosphatase 75 U/L      Total Protein 9 0 g/dL      Albumin 4 1 g/dL      Total Bilirubin 2 10 mg/dL      eGFR 95 ml/min/1 73sq m     Narrative:       Hemolysis  National Kidney Disease Foundation guidelines for Chronic Kidney Disease (CKD):     Stage 1 with normal or high GFR (GFR > 90 mL/min/1 73 square meters)    Stage 2 Mild CKD (GFR = 60-89 mL/min/1 73 square meters)    Stage 3A Moderate CKD (GFR = 45-59 mL/min/1 73 square meters)    Stage 3B Moderate CKD (GFR = 30-44 mL/min/1 73 square meters)    Stage 4 Severe CKD (GFR = 15-29 mL/min/1 73 square meters)    Stage 5 End Stage CKD (GFR <15 mL/min/1 73 square meters)  Note: GFR calculation is accurate only with a steady state creatinine    CK Total with Reflex CKMB [[id number]]  (Normal) Collected:  09/22/19 1933    Lab Status:  Final result Specimen:  Blood from Arm, Right Updated:  09/22/19 1952     Total CK 63 U/L     Narrative:       Hemolysis    Lactic acid, plasma [[id number]]  (Normal) Collected:  09/22/19 1933    Lab Status:  Final result Specimen:  Blood from Arm, Right Updated:  09/22/19 1951     LACTIC ACID 1 6 mmol/L     Narrative:       Result may be elevated if tourniquet was used during collection      CBC and differential [[id number]]  (Abnormal) Collected:  09/22/19 1933    Lab Status:  Final result Specimen:  Blood from Arm, Right Updated:  09/22/19 1950     WBC 12 70 Thousand/uL      RBC 5 08 Million/uL      Hemoglobin 16 2 g/dL      Hematocrit 47 2 %      MCV 93 fL      MCH 31 9 pg      MCHC 34 4 g/dL      RDW 13 2 %      MPV 8 6 fL      Platelets 667 Thousands/uL      Neutrophils Relative 78 %      Lymphocytes Relative 15 %      Monocytes Relative 6 %      Eosinophils Relative 0 %      Basophils Relative 1 %      Neutrophils Absolute 9 90 Thousands/µL      Lymphocytes Absolute 1 90 Thousands/µL      Monocytes Absolute 0 80 Thousand/µL      Eosinophils Absolute 0 10 Thousand/µL      Basophils Absolute 0 10 Thousands/µL     Urine Microscopic [[id number]]  (Abnormal) Collected:  09/22/19 1932    Lab Status:  Final result Specimen:  Urine, Clean Catch Updated:  09/22/19 1948     RBC, UA None Seen /hpf      WBC, UA 0-1 /hpf      Epithelial Cells Occasional /hpf      Bacteria, UA Occasional /hpf     UA w Reflex to Microscopic [[id number]]  (Abnormal) Collected:  09/22/19 1932    Lab Status:  Final result Specimen:  Urine, Clean Catch Updated:  09/22/19 1943     Color, UA Brown     Clarity, UA Clear     Specific Gravity, UA 1 010     pH, UA 6 0     Leukocytes, UA Negative     Nitrite, UA Negative     Protein, UA 15 (Trace) mg/dl      Glucose, UA Negative mg/dl      Ketones, UA 15 (1+) mg/dl      Bilirubin, UA 1 mg/dL     Blood, UA Negative     UROBILINOGEN UA 12 0 mg/dL                  XR forearm 2 views LEFT   ED Interpretation by Sandro Ponce DO (09/22 2051)   No free air  CT chest without contrast   Final Result by Yumiko Mccarty MD (09/22 2112)      Very mild peripheral mild tree-in-bud opacities compatible which may be seen with atypical infection  Workstation performed: [id number]         XR chest 2 views   Final Result by Thierno Ramirez MD (09/22 1934)      No acute cardiopulmonary disease              Workstation performed: [id number] Procedures  Procedures       ED Course  ED Course as of Sep 22 2138   Monik Rojas Sep 22, 2019   7838 Patient is a 30-year-old male with history of hepatitis coming in today with shortness of breath and overall not feeling well  Patient on exam need sepsis criteria  Will start with basic labs including a lactic acid, IV fluids as well as a chest x-ray  Will give DuoNeb patient does have diffuse rales as well  On exam he is nontoxic appearing and in no acute distress  Differential diagnosis includes but not limited to:  COPD exacerbation, asthma exacerbation, pneumonia, PE, pulmonary edema, pulmonary effusions, lung mass/malignancy, CHF, ACS, NSTEMI, acute kidney injury, electrolyte dysfunction, inhalation injury, anemia, valvular disorder, viral etiology,      Portions of the record may have been created with voice recognition software  Occasional wrong word or "sound a like" substitutions may have occurred due to the inherent limitations of voice recognition software  Read the chart carefully and recognize, using context, where substitutions have occurred  1949 Patient's urine is brown with ketones and protein  Will give 2 L IV fluid  Chest x-ray stable  1955 Patient with no evidence of end-organ damage however does have leukocytosis with elevated neutrophils but no bands  Will CT without continue 2 L IV fluid  2035 Patient does have a 101 temperature  On read exam patient is updated pending CT  He does have noted left distal forearm erythema, swelling  Is consistent with cellulitis and possible early abscess  Will obtain x-ray, blood cultures and plan for admission  2120 Discussed with Luis Eduardo Carroll under  Dr Ramesh Mtz  We had a detailed discussion of the patient's condition and case,  including need for admission  Accepts to his/her service  Bed request/bridging orders placed  2124 Patient sleeping in bed  I did discuss with him regarding admission  He is agreeable  Initial Sepsis Screening     Row Name 09/22/19 2136                Is the patient's history suggestive of a new or worsening infection? (!) Yes (Proceed)  -NB        Suspected source of infection  pneumonia;soft tissue  -NB        Are two or more of the following signs & symptoms of infection both present and new to the patient?         Indicate SIRS criteria  Hyperthemia > 38 3C (100 9F); Tachycardia > 90 bpm  -NB        If the answer is yes to both questions, suspicion of sepsis is present          If severe sepsis is present AND tissue hypoperfusion perists in the hour after fluid resuscitation or lactate > 4, the patient meets criteria for SEPTIC SHOCK          Are any of the following organ dysfunction criteria present within 6 hours of suspected infection and SIRS criteria that are NOT considered to be chronic conditions? No  -NB        Organ dysfunction          Date of presentation of severe sepsis          Time of presentation of severe sepsis          Tissue hypoperfusion persists in the hour after crystalloid fluid administration, evidenced, by either:          Was hypotension present within one hour of the conclusion of crystalloid fluid administration?           Date of presentation of septic shock          Time of presentation of septic shock            User Key  (r) = Recorded By, (t) = Taken By, (c) = Cosigned By    [address] Name Provider Type    NB Tia Hooper DO Physician           Default Flowsheet Data (last 720 hours)      Sepsis Reassess     Row Name 09/22/19 2137                   Repeat Volume Status and Tissue Perfusion Assessment Performed    Repeat Volume Status and Tissue Perfusion Assessment Performed  Yes  -NB           Volume Status and Tissue Perfusion Post Fluid Resuscitation * Must Document All *    Vital Signs Reviewed (HR, RR, BP, T)  Yes  -NB        Shock Index Reviewed  Yes  -NB        Arterial Oxygen Saturation Reviewed (POx, SaO2 or SpO2) Yes (comment %)  -NB        Cardio  Normal S1/S2; Regular rate and rhythm; No murmor  -NB        Pulmonary  Normal effort;Clear to auscultation  -NB        Capillary Refill  Brisk  -NB        Peripheral Pulses  Radial;Dorsalis Pedis  -NB        Peripheral Pulse  +2  -NB        Dorsalis Pedis  +2  -NB        Skin  Warm  -NB        Urine output assessed             *OR*   Intensive Monitoring- Must Document One of the Following Four *:    Vital Signs Reviewed          * Central Venous Pressure (CVP or RAP)          * Central Venous Oxygen (SVO2, ScvO2 or Oxygen saturation via central catheter)          * Bedside Cardiovascular US in IVC diameter and % collapse          * Passive Leg Raise OR Crystalloid Challenge            User Key  (r) = Recorded By, (t) = Taken By, (c) = Cosigned By    Initials Name Provider Type    HAROON Navarro DO Physician                MDM  Number of Diagnoses or Management Options  Diagnosis management comments:   EKG INTERPRETATION at 7:34 p m  RHYTHM:  Normal sinus rhythm at 88 beats per minute  AXIS:  Normal axis  INTERVALS:  NH interval measured at 122 milliseconds  QRS COMPLEX:  QRS measured at 82 milliseconds  ST SEGMENT:  Nonspecific ST segment changes  Artifact present  QT INTERVAL:  QTC measured at 447 milliseconds  COMPARED WITH PRIOR   More Mae   Interpretation by Wei Cohen DO           Amount and/or Complexity of Data Reviewed  Clinical lab tests: ordered and reviewed  Tests in the radiology section of CPT®: ordered and reviewed  Tests in the medicine section of CPT®: ordered and reviewed  Independent visualization of images, tracings, or specimens: yes        Disposition  Final diagnoses:   Fever   Cellulitis of left forearm   Heroin abuse ([facility] )   Substance abuse ([facility] )   Hepatitis C   Lung infection     Time reflects when diagnosis was documented in both MDM as applicable and the Disposition within this note     Time User Action Codes Description Comment    9/22/2019 8:37 PM BendFranco robert Sanjiv Add [R50 9] Fever     9/22/2019  8:37 PM Bendock, Chela L Add [N09 517] Cellulitis of left forearm     9/22/2019  8:37 PM Bendock, Chela L Add [F11 10] Heroin abuse ([facility] )     9/22/2019  8:37 PM Bendyesica, Tonyunita Sanjiv Add [F19 10] Substance abuse ([facility] )     9/22/2019  8:37 PM Bendock, Lyubov Clas L Add [B19 20] Hepatitis C     9/22/2019  9:16 PM Bendock, Lyubov Clas L Add [A31 0] 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[facility] )     9/22/2019  9:16 PM Bendock, Lyubov Clas L Remove [A31 0] 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[facility] )     9/22/2019  9:16 PM BendFranco robert Sanjiv Add [J18 9] Lung infection       ED Disposition     ED Disposition Condition Date/Time Comment    Admit Stable Sun Sep 22, 2019  9:19 PM Case was discussed with Anna Nassar and the patient's admission status was agreed to be Admission Status: inpatient status to the service of Dr Juanpablo Ashraf   Follow-up Information    None         Patient's Medications   Discharge Prescriptions    No medications on file     No discharge procedures on file      ED Provider  Electronically Signed by           Vila Bosworth, DO  09/22/19 2126       Vila Bosworth, DO  09/22/19 2138

## 2019-09-23 ENCOUNTER — APPOINTMENT (INPATIENT)
Dept: NON INVASIVE DIAGNOSTICS | Facility: HOSPITAL | Age: 49
DRG: 720 | End: 2019-09-23
Payer: COMMERCIAL

## 2019-09-23 PROBLEM — F11.20 HEROIN USE DISORDER, SEVERE, DEPENDENCE (HCC): Status: ACTIVE | Noted: 2017-08-16

## 2019-09-23 LAB
ALBUMIN SERPL BCP-MCNC: 3.5 G/DL (ref 3–5.2)
ALP SERPL-CCNC: 81 U/L (ref 43–122)
ALT SERPL W P-5'-P-CCNC: 16 U/L (ref 9–52)
ANION GAP SERPL CALCULATED.3IONS-SCNC: 6 MMOL/L (ref 5–14)
AST SERPL W P-5'-P-CCNC: 19 U/L (ref 17–59)
ATRIAL RATE: 55 BPM
BASOPHILS # BLD AUTO: 0 THOUSANDS/ΜL (ref 0–0.1)
BASOPHILS NFR BLD AUTO: 1 % (ref 0–1)
BILIRUB SERPL-MCNC: 0.8 MG/DL
BILIRUB UR QL STRIP: NEGATIVE
BUN SERPL-MCNC: 14 MG/DL (ref 5–25)
CALCIUM SERPL-MCNC: 8.3 MG/DL (ref 8.4–10.2)
CHLORIDE SERPL-SCNC: 107 MMOL/L (ref 97–108)
CLARITY UR: CLEAR
CO2 SERPL-SCNC: 25 MMOL/L (ref 22–30)
COLOR UR: ABNORMAL
CREAT SERPL-MCNC: 0.6 MG/DL (ref 0.7–1.5)
EOSINOPHIL # BLD AUTO: 0.1 THOUSAND/ΜL (ref 0–0.4)
EOSINOPHIL NFR BLD AUTO: 1 % (ref 0–6)
ERYTHROCYTE [DISTWIDTH] IN BLOOD BY AUTOMATED COUNT: 13 %
EST. AVERAGE GLUCOSE BLD GHB EST-MCNC: 108 MG/DL
GFR SERPL CREATININE-BSD FRML MDRD: 120 ML/MIN/1.73SQ M
GLUCOSE SERPL-MCNC: 141 MG/DL (ref 70–99)
GLUCOSE UR STRIP-MCNC: NEGATIVE MG/DL
HAV IGM SER QL: ABNORMAL
HBA1C MFR BLD: 5.4 % (ref 4.2–6.3)
HBV CORE IGM SER QL: ABNORMAL
HBV SURFACE AG SER QL: ABNORMAL
HCT VFR BLD AUTO: 43.4 % (ref 41–53)
HCV AB SER QL: ABNORMAL
HGB BLD-MCNC: 14.9 G/DL (ref 13.5–17.5)
HGB UR QL STRIP.AUTO: NEGATIVE
HIV 1+2 AB+HIV1 P24 AG SERPL QL IA: NORMAL
HIV1 P24 AG SER QL: NORMAL
KETONES UR STRIP-MCNC: NEGATIVE MG/DL
L PNEUMO1 AG UR QL IA.RAPID: NEGATIVE
LEUKOCYTE ESTERASE UR QL STRIP: NEGATIVE
LYMPHOCYTES # BLD AUTO: 1.5 THOUSANDS/ΜL (ref 0.5–4)
LYMPHOCYTES NFR BLD AUTO: 21 % (ref 25–45)
MAGNESIUM SERPL-MCNC: 2.4 MG/DL (ref 1.6–2.3)
MCH RBC QN AUTO: 32 PG (ref 26–34)
MCHC RBC AUTO-ENTMCNC: 34.3 G/DL (ref 31–36)
MCV RBC AUTO: 93 FL (ref 80–100)
MONOCYTES # BLD AUTO: 0.7 THOUSAND/ΜL (ref 0.2–0.9)
MONOCYTES NFR BLD AUTO: 10 % (ref 1–10)
NEUTROPHILS # BLD AUTO: 4.9 THOUSANDS/ΜL (ref 1.8–7.8)
NEUTS SEG NFR BLD AUTO: 67 % (ref 45–65)
NITRITE UR QL STRIP: NEGATIVE
P AXIS: 65 DEGREES
PH UR STRIP.AUTO: 6 [PH]
PHOSPHATE SERPL-MCNC: 2.4 MG/DL (ref 2.5–4.8)
PLATELET # BLD AUTO: 175 THOUSANDS/UL (ref 150–450)
PMV BLD AUTO: 8.5 FL (ref 8.9–12.7)
POTASSIUM SERPL-SCNC: 4.2 MMOL/L (ref 3.6–5)
PR INTERVAL: 126 MS
PROCALCITONIN SERPL-MCNC: 0.54 NG/ML
PROCALCITONIN SERPL-MCNC: 0.66 NG/ML
PROT SERPL-MCNC: 7.6 G/DL (ref 5.9–8.4)
PROT UR STRIP-MCNC: NEGATIVE MG/DL
QRS AXIS: 34 DEGREES
QRSD INTERVAL: 80 MS
QT INTERVAL: 466 MS
QTC INTERVAL: 445 MS
RBC # BLD AUTO: 4.66 MILLION/UL (ref 4.5–5.9)
S PNEUM AG UR QL: NEGATIVE
SODIUM SERPL-SCNC: 138 MMOL/L (ref 137–147)
SP GR UR STRIP.AUTO: 1.01 (ref 1–1.04)
T WAVE AXIS: 64 DEGREES
TROPONIN I SERPL-MCNC: <0.01 NG/ML (ref 0–0.03)
UROBILINOGEN UA: 12 MG/DL
VENTRICULAR RATE: 55 BPM
WBC # BLD AUTO: 7.2 THOUSAND/UL (ref 4.5–11)

## 2019-09-23 PROCEDURE — 81003 URINALYSIS AUTO W/O SCOPE: CPT | Performed by: PHYSICIAN ASSISTANT

## 2019-09-23 PROCEDURE — 87449 NOS EACH ORGANISM AG IA: CPT | Performed by: PHYSICIAN ASSISTANT

## 2019-09-23 PROCEDURE — 84145 PROCALCITONIN (PCT): CPT | Performed by: PHYSICIAN ASSISTANT

## 2019-09-23 PROCEDURE — 84100 ASSAY OF PHOSPHORUS: CPT | Performed by: PHYSICIAN ASSISTANT

## 2019-09-23 PROCEDURE — 84484 ASSAY OF TROPONIN QUANT: CPT | Performed by: PHYSICIAN ASSISTANT

## 2019-09-23 PROCEDURE — 85025 COMPLETE CBC W/AUTO DIFF WBC: CPT | Performed by: PHYSICIAN ASSISTANT

## 2019-09-23 PROCEDURE — 80053 COMPREHEN METABOLIC PANEL: CPT | Performed by: PHYSICIAN ASSISTANT

## 2019-09-23 PROCEDURE — 93010 ELECTROCARDIOGRAM REPORT: CPT | Performed by: INTERNAL MEDICINE

## 2019-09-23 PROCEDURE — 99232 SBSQ HOSP IP/OBS MODERATE 35: CPT | Performed by: FAMILY MEDICINE

## 2019-09-23 PROCEDURE — 83735 ASSAY OF MAGNESIUM: CPT | Performed by: PHYSICIAN ASSISTANT

## 2019-09-23 PROCEDURE — 83036 HEMOGLOBIN GLYCOSYLATED A1C: CPT | Performed by: PHYSICIAN ASSISTANT

## 2019-09-23 PROCEDURE — 93971 EXTREMITY STUDY: CPT

## 2019-09-23 PROCEDURE — 93971 EXTREMITY STUDY: CPT | Performed by: SURGERY

## 2019-09-23 PROCEDURE — 93005 ELECTROCARDIOGRAM TRACING: CPT

## 2019-09-23 RX ORDER — VANCOMYCIN HYDROCHLORIDE 1 G/200ML
1000 INJECTION, SOLUTION INTRAVENOUS EVERY 8 HOURS
Status: DISCONTINUED | OUTPATIENT
Start: 2019-09-24 | End: 2019-09-24

## 2019-09-23 RX ADMIN — ACETAMINOPHEN 650 MG: 325 TABLET ORAL at 12:15

## 2019-09-23 RX ADMIN — ACETAMINOPHEN 650 MG: 325 TABLET ORAL at 17:31

## 2019-09-23 RX ADMIN — CEFTRIAXONE 1000 MG: 1 INJECTION, SOLUTION INTRAVENOUS at 21:18

## 2019-09-23 RX ADMIN — CLONIDINE HYDROCHLORIDE 0.1 MG: 0.1 TABLET ORAL at 21:18

## 2019-09-23 RX ADMIN — CLONIDINE HYDROCHLORIDE 0.1 MG: 0.1 TABLET ORAL at 07:17

## 2019-09-23 RX ADMIN — Medication 5 MG: at 17:32

## 2019-09-23 RX ADMIN — MAGNESIUM SULFATE IN WATER 2 G: 40 INJECTION, SOLUTION INTRAVENOUS at 02:43

## 2019-09-23 RX ADMIN — CLONIDINE HYDROCHLORIDE 0.1 MG: 0.1 TABLET ORAL at 14:54

## 2019-09-23 RX ADMIN — VANCOMYCIN HYDROCHLORIDE 1250 MG: 1 INJECTION, POWDER, LYOPHILIZED, FOR SOLUTION INTRAVENOUS at 12:15

## 2019-09-23 RX ADMIN — ACETAMINOPHEN 650 MG: 325 TABLET ORAL at 07:17

## 2019-09-23 RX ADMIN — ENOXAPARIN SODIUM 40 MG: 40 INJECTION SUBCUTANEOUS at 08:44

## 2019-09-23 RX ADMIN — AZITHROMYCIN MONOHYDRATE 500 MG: 500 INJECTION, POWDER, LYOPHILIZED, FOR SOLUTION INTRAVENOUS at 01:26

## 2019-09-23 NOTE — NURSING NOTE
Physical assessment essentially unchanged  Left forearm with large red lump at wrist area  Antibiotics given as ordered  Lung sounds remain coarse with a moist cough noted  Sats on RA stable as noted  Pt is extremely diaphoretic, clothing changed multiple times, Catapress for withdrawal symptoms  Heart monitor d/c'd as per order, heart rates previously were in 50's and dipped into 40's when pt was sleeping

## 2019-09-23 NOTE — PLAN OF CARE
Problem: Nutrition/Hydration-ADULT  Goal: Nutrient/Hydration intake appropriate for improving, restoring or maintaining nutritional needs  Description  Monitor and assess patient's nutrition/hydration status for malnutrition  Collaborate with interdisciplinary team and initiate plan and interventions as ordered  Monitor patient's weight and dietary intake as ordered or per policy  Utilize nutrition screening tool and intervene as necessary  Determine patient's food preferences and provide high-protein, high-caloric foods as appropriate       INTERVENTIONS:  - Monitor oral intake, urinary output, labs, and treatment plans  - Assess nutrition and hydration status and recommend course of action  - Evaluate amount of meals eaten  - Assist patient with eating if necessary   - Allow adequate time for meals  - Recommend/ encourage appropriate diets, oral nutritional supplements, and vitamin/mineral supplements  - Order, calculate, and assess calorie counts as needed  - Recommend, monitor, and adjust tube feedings and TPN/PPN based on assessed needs  - Assess need for intravenous fluids  - Provide specific nutrition/hydration education as appropriate  - Include patient/family/caregiver in decisions related to nutrition  Outcome: Progressing     Problem: PAIN - ADULT  Goal: Verbalizes/displays adequate comfort level or baseline comfort level  Description  Interventions:  - Encourage patient to monitor pain and request assistance  - Assess pain using appropriate pain scale  - Administer analgesics based on type and severity of pain and evaluate response  - Implement non-pharmacological measures as appropriate and evaluate response  - Consider cultural and social influences on pain and pain management  - Notify physician/advanced practitioner if interventions unsuccessful or patient reports new pain  Outcome: Progressing     Problem: INFECTION - ADULT  Goal: Absence or prevention of progression during hospitalization  Description  INTERVENTIONS:  - Assess and monitor for signs and symptoms of infection  - Monitor lab/diagnostic results  - Monitor all insertion sites, i e  indwelling lines, tubes, and drains  - Monitor endotracheal if appropriate and nasal secretions for changes in amount and color  - Purcell appropriate cooling/warming therapies per order  - Administer medications as ordered  - Instruct and encourage patient and family to use good hand hygiene technique  - Identify and instruct in appropriate isolation precautions for identified infection/condition  Outcome: Progressing  Goal: Absence of fever/infection during neutropenic period  Description  INTERVENTIONS:  - Monitor WBC    Outcome: Progressing     Problem: SAFETY ADULT  Goal: Patient will remain free of falls  Description  INTERVENTIONS:  - Assess patient frequently for physical needs  -  Identify cognitive and physical deficits and behaviors that affect risk of falls    -  Purcell fall precautions as indicated by assessment   - Educate patient/family on patient safety including physical limitations  - Instruct patient to call for assistance with activity based on assessment  - Modify environment to reduce risk of injury  - Consider OT/PT consult to assist with strengthening/mobility  Outcome: Progressing  Goal: Maintain or return to baseline ADL function  Description  INTERVENTIONS:  -  Assess patient's ability to carry out ADLs; assess patient's baseline for ADL function and identify physical deficits which impact ability to perform ADLs (bathing, care of mouth/teeth, toileting, grooming, dressing, etc )  - Assess/evaluate cause of self-care deficits   - Assess range of motion  - Assess patient's mobility; develop plan if impaired  - Assess patient's need for assistive devices and provide as appropriate  - Encourage maximum independence but intervene and supervise when necessary  - Involve family in performance of ADLs  - Assess for home care needs following discharge   - Consider OT consult to assist with ADL evaluation and planning for discharge  - Provide patient education as appropriate  Outcome: Progressing  Goal: Maintain or return mobility status to optimal level  Description  INTERVENTIONS:  - Assess patient's baseline mobility status (ambulation, transfers, stairs, etc )    - Identify cognitive and physical deficits and behaviors that affect mobility  - Identify mobility aids required to assist with transfers and/or ambulation (gait belt, sit-to-stand, lift, walker, cane, etc )  - Sabina fall precautions as indicated by assessment  - Record patient progress and toleration of activity level on Mobility SBAR; progress patient to next Phase/Stage  - Instruct patient to call for assistance with activity based on assessment  - Consider rehabilitation consult to assist with strengthening/weightbearing, etc   Outcome: Progressing     Problem: DISCHARGE PLANNING  Goal: Discharge to home or other facility with appropriate resources  Description  INTERVENTIONS:  - Identify barriers to discharge w/patient and caregiver  - Arrange for needed discharge resources and transportation as appropriate  - Identify discharge learning needs (meds, wound care, etc )  - Arrange for interpretive services to assist at discharge as needed  - Refer to Case Management Department for coordinating discharge planning if the patient needs post-hospital services based on physician/advanced practitioner order or complex needs related to functional status, cognitive ability, or social support system  Outcome: Progressing     Problem: Knowledge Deficit  Goal: Patient/family/caregiver demonstrates understanding of disease process, treatment plan, medications, and discharge instructions  Description  Complete learning assessment and assess knowledge base    Interventions:  - Provide teaching at level of understanding  - Provide teaching via preferred learning methods  Outcome: Progressing

## 2019-09-23 NOTE — H&P
H&P- Beryle Inoue 1970, 54 y o  male MRN: [id number]    Unit/Bed#:  -01 Encounter: [id number]    Primary Care Provider: Delta Andre MD   Date and time admitted to hospital: 9/22/2019  7:00 PM    Sepsis [facility])  Assessment & Plan  Secondary to L forearm cellulitis & possible pneumonia  Patient also actively withdrawing from heroin so clinical picture is not definitive  No signs of end organ dysfunction & lactic acid normal   s/p 2000 ml IVF bolus & NS now running @ 125 ml/hr  Plan to closely monitor LUE  F/U BC x 2 & UA/c+s      * Cellulitis of left forearm  Assessment & Plan  Patient reports "feeling unwell" with erythema & tenderness of L forearm x 3 days  Patients is active IVDA with multiple injection sites noted  Plan to bib L forearm erythema margins; currently with no compartment tenderness or restrictions in ROM  LUE neurovasc intact with sensation intact all dermatomes & +2 radial pulse  Q 2 hour neurovasc checks  Wash with soap & water daily  Check Duplex LUE to R/O DVT  Vancomycin IV x 1 given 9/22 @ 11 pm to cover possible MRSA  MRSA swab pending  BC x 2 pending  Patient verbally consents for me to order acute hepatitis panel & HIV testing, at this time  Pneumonia  Assessment & Plan  R/O pneumonia  CT Chest reviewed  Patient without tachypnea or hypoxia at this time  Patient denies cough or pulmonary secretions at this time  Procalcitonin pending  Ceftriaxone & Azithromycin IV for now  Chest Physiotherapy & Pulmonary toilet  O2 NC to keep O2 sat > 92%  Xoponex nebs, as needed    Acute drug withdrawal syndrome [facility])  Assessment & Plan  Patient reports recent heroin & K2 usage, reports last usage was in the am 9/21/2019  Patient currently with mild anxiety, diaphoresis, and abdominal cramping that patient reports as "how my withdrawal starts"  Symptomatic medication management of acute withdrawal syndrome ordered, and to be used as needed    Close observation  Psych consult pending- patient requesting rehab services    Substance abuse [facility])  Assessment & Plan  --    Heroin use disorder, mild, in sustained remission ([place] [id number] )  Assessment & Plan  --    Moderate episode of recurrent major depressive disorder ([place] [address] )  Assessment & Plan  Currently on no prescription medication  Psych consulted  CM consulted      Patient Active Problem List    Diagnosis Date Noted    Cellulitis of left forearm 09/22/2019     Priority: A    Sepsis ([place] [address] ) 09/22/2019     Priority: A    Pneumonia 09/22/2019     Priority: B    Substance abuse ([place] [address] ) 09/22/2019     Priority: C    Acute drug withdrawal syndrome ([place] [address] ) 09/22/2019     Priority: C    Heroin use disorder, mild, in sustained remission ([place] [address] ) 08/16/2017     Priority: C    Moderate episode of recurrent major depressive disorder ([place] [address] ) 09/05/2017     Priority: D    Fibrosis of liver 09/13/2017    BMI 35 0-35 9,adult 06/05/2017    Chronic bilateral back pain 06/05/2017    Chronic hepatitis C without hepatic coma ([place] [address] ) 06/05/2017    Dental disease 06/05/2017    Fatigue due to exposure 06/05/2017    Financial problems 06/05/2017    Sleep apnea 06/05/2017         -------------------------------------------------------------------------------------------------------------  Chief Complaint: " I need help"  "I need to go to rehab"    History of Present Illness   HX and PE limited by: patient's mental status, anxious & with limited engagement secondary to stating "I'm going through withdrawal"    Luna Arango is a 54 y o  male with past medical history of MDD, polysubstance abuse, homelessness, chronic back pain, & hepatitis Cwho presents to [address] in the evening of 9/22/2019 secondary to "feeling unwell for several days" & "I need to go to rehab to get help for my drug usage"    Patient reports being an active IVDA with most recent usage 9/21/2019 in the am   He states that he normally uses 3-4 bags of heroin daily  He reports that he "used K2" this evening approximately 30 minutes prior to arrival to the ER"    The patient reports that he has had 1-2 days of progressively worsening generalized fatigue & weakness  He reports abdominal cramping & nauseousness started this morning  He reports not eating a meal for over a month  He reports a "small hard bowel movement yesterday"  Denies melena or blood in the stool  He reports hesitancy with voiding but no dysuria  He reported feeling SOB while in the ER but denies SOB, HENDRICKS, or pain at this time  He denies fevers or chills  He is currently diaphoretic but the patient reports "its because I'm going through withdraw"  Currently, patient is with mild anxiety, diaphoresis, and abdominal cramping that he rates as "2/10 & diffuse"  No N/V currently  He denies dizziness, lightheadedness, syncope, changes in vision, numbness, or weakness  He currently denies CP, SOB, and palpitations  Patient allows me to evaluate his L forearm which has mild tenderness to palpation, and erythema of the medial margins noted  Patient currently denies hand pain, weakness, or numbness  He requests medications to help him "feel better from the withdraw"     Patient is not suicidal, homicidal, paranoid or having any active auditory and/or visual hallucinations at this time  History obtained from chart review and the patient   -------------------------------------------------------------------------------------------------------------    Code Status: Level 1 - Full Code  --------------------------------------------------------------------------------------------------------------  Review of Systems   Constitutional: Positive for appetite change, diaphoresis and fatigue  Negative for chills and fever  HENT: Positive for dental problem and mouth sores  Negative for congestion, drooling, nosebleeds, rhinorrhea, sinus pain, sore throat, tinnitus and trouble swallowing  Eyes: Negative  Respiratory: Negative  Cardiovascular: Negative  Gastrointestinal: Positive for abdominal pain and constipation  Negative for abdominal distention, anal bleeding, blood in stool, diarrhea, nausea and vomiting  Endocrine: Negative  Genitourinary: Positive for difficulty urinating  Negative for dysuria, hematuria, penile pain and penile swelling  Musculoskeletal: Positive for arthralgias, back pain and myalgias  Negative for neck pain and neck stiffness  Skin: Positive for rash  erythema noted to the medial aspect of the L forearm   Allergic/Immunologic: Negative  Neurological: Negative  Hematological: Negative  Psychiatric/Behavioral: Positive for decreased concentration, dysphoric mood and sleep disturbance  Negative for hallucinations, self-injury and suicidal ideas  The patient is nervous/anxious  The patient is not hyperactive  Physical Exam   Constitutional: He is oriented to person, place, and time  Poor nourished & appears unhealthy   HENT:   Head: Atraumatic  Right Ear: External ear normal    Left Ear: External ear normal    Nose: Nose normal    Mouth/Throat: No oropharyngeal exudate  Poor dentician with oral ulcers noted   Eyes: Pupils are equal, round, and reactive to light  Conjunctivae and EOM are normal  Right eye exhibits no discharge  Left eye exhibits no discharge  Neck: Normal range of motion  Neck supple  No JVD present  No tracheal deviation present  Cardiovascular: Normal rate, regular rhythm and normal heart sounds  Exam reveals no friction rub  No murmur heard  Pulmonary/Chest: Effort normal and breath sounds normal  No respiratory distress  He has no wheezes  He has no rales  He exhibits no tenderness  Abdominal: Soft  He exhibits no distension  There is tenderness  There is no rebound and no guarding  Hyperactive BS   Genitourinary: Penis normal    Musculoskeletal: Normal range of motion  He exhibits tenderness  He exhibits no deformity  L forearm with eythema and tenderness to palpation  Compartments soft & LUE neurovasc intact   Neurological: He is alert and oriented to person, place, and time  No cranial nerve deficit or sensory deficit  He exhibits normal muscle tone  Skin: Skin is dry  Capillary refill takes 2 to 3 seconds  No rash noted  There is erythema  No pallor  --------------------------------------------------------------------------------------------------------------  Historical Information   Past Medical History:   Diagnosis Date    Drug abuse ([facility] [address] )     Hepatitis C virus     Sleep apnea      Past Surgical History:   Procedure Laterality Date    CYST REMOVAL      ELBOW SURGERY       Social History   Social History     Substance and Sexual Activity   Alcohol Use No     Social History     Substance and Sexual Activity   Drug Use Yes    Types: Heroin    Comment: k2     Social History     Tobacco Use   Smoking Status Current Every Day Smoker    Packs/day: 0 50    Types: Cigarettes   Smokeless Tobacco Never Used     Exercise History: no aerobic exercise reported  Family History:  History reviewed  No pertinent family history  Vitals:   Vitals:    09/22/19 2136 09/22/19 2145 09/22/19 2200 09/22/19 2207   BP:    112/64   BP Location:    Right arm   Pulse:  90  85   Resp:  16  18   Temp:  97 6 °F (36 4 °C)  (!) 96 6 °F (35 9 °C)   TempSrc:  Tympanic  Temporal   SpO2:  91%  94%   Weight:   64 1 kg (141 lb 5 oz) 64 1 kg (141 lb 5 oz)   Height: 5' 2" (1 575 m)  5' 2" (1 575 m) 5' 2" (1 575 m)     Temp  Min: 96 6 °F (35 9 °C)  Max: 101 °F (38 3 °C)  IBW: 54 6 kg  Height: 5' 2" (157 5 cm)  Body mass index is 25 85 kg/m²      Medications:  Current Facility-Administered Medications   Medication Dose Route Frequency    [START ON 9/23/2019] acetaminophen (TYLENOL) tablet 650 mg  650 mg Oral Q6H [facility]    [START ON 9/23/2019] cefepime (MAXIPIME) IVPB (premix) 2,000 mg  2,000 mg Intravenous Q12H    cloNIDine (CATAPRES) tablet 0 1 mg 0 1 mg Oral Q8H [facility]    diazepam (VALIUM) injection 5 mg  5 mg Intravenous Q6H PRN    [START ON 9/23/2019] enoxaparin (LOVENOX) subcutaneous injection 40 mg  40 mg Subcutaneous Daily    hydrOXYzine HCL (ATARAX) tablet 50 mg  50 mg Oral Q6H PRN    ibuprofen (MOTRIN) tablet 600 mg  600 mg Oral Q6H PRN    loperamide (IMODIUM) capsule 4 mg  4 mg Oral TID PRN    magnesium sulfate 2 g/50 mL IVPB (premix) 2 g  2 g Intravenous Once    [START ON 9/23/2019] nicotine (NICODERM CQ) 21 mg/24 hr TD 24 hr patch 1 patch  1 patch Transdermal Daily    ondansetron (ZOFRAN) injection 4 mg  4 mg Intravenous Q6H PRN    pneumococcal 13-valent conjugate vaccine (PREVNAR-13) IM injection 0 5 mL  0 5 mL Intramuscular Prior to discharge    sodium chloride 0 9 % infusion  125 mL/hr Intravenous Continuous    vancomycin (VANCOCIN) 1,250 mg in sodium chloride 0 9 % 250 mL IVPB  20 mg/kg Intravenous Once    [START ON 9/23/2019] vancomycin (VANCOCIN) IVPB (premix) 1,000 mg  15 mg/kg Intravenous Q12H     Home medications:  Prior to Admission Medications   Prescriptions Last Dose Informant Patient Reported? Taking?    FLUoxetine (PROzac) 20 mg capsule Past Week at Unknown time  Yes Yes   Sig: Take 20 mg by mouth daily      Facility-Administered Medications: None     Allergies:  No Known Allergies    Laboratory and Diagnostics:  Results from last 7 days   Lab Units 09/22/19 1933   WBC Thousand/uL 12 70*   HEMOGLOBIN g/dL 16 2   HEMATOCRIT % 47 2   PLATELETS Thousands/uL 204   NEUTROS PCT % 78*   MONOS PCT % 6     Results from last 7 days   Lab Units 09/22/19  1933   SODIUM mmol/L 135*   POTASSIUM mmol/L 4 4   CHLORIDE mmol/L 98   CO2 mmol/L 29   ANION GAP mmol/L 8   BUN mg/dL 16   CREATININE mg/dL 0 95   CALCIUM mg/dL 9 0   GLUCOSE RANDOM mg/dL 121*   ALT U/L 16   AST U/L 23   ALK PHOS U/L 75   ALBUMIN g/dL 4 1   TOTAL BILIRUBIN mg/dL 2 10*     Results from last 7 days   Lab Units 09/22/19  1933   MAGNESIUM mg/dL 1 9           Results from last 7 days   Lab Units 19   TROPONIN I ng/mL <0 01     Results from last 7 days   Lab Units 19   LACTIC ACID mmol/L 1 6     Micro:   BC x 2: pending   UA/c+s: pensing    EKG: NSR without ischemic changes  Imagin/22 CT Chest:  Very mild peripheral mild tree-in-bud opacities compatible which may be seen with atypical infection   L forearm X-ray: no subcutaneous air or FB noted   I have personally reviewed pertinent reports  and I have personally reviewed pertinent films in PACS  ------------------------------------------------------------------------------------------------------------  Anticipated Length of Stay is > 2 midnights    Counseling / Coordination of Care  Total time spent today 35 minutes  Greater than 50% of total time was spent with the patient and / or family counseling and / or coordination of care  A description of the counseling / coordination of care: discussion with patient & nursing team    Miroslava Pitts PA-C    Portions of the record may have been created with voice recognition software  Occasional wrong word or "sound a like" substitutions may have occurred due to the inherent limitations of voice recognition software    Read the chart carefully and recognize, using context, where substitutions have occurred

## 2019-09-23 NOTE — NURSING NOTE
Patient arrived from ED at 2300  Steady gait to bed from stretcher  Diaphoretic and afebrile  A+Ox4  States he wants to quit drugs and has no friends or family because he started doing drugs  B/L sclera red  Lungs diminished with some coarseness, and moist non prod cough  NSR on monitor  C/o abdominal pain and track marks noted to B/L UE

## 2019-09-23 NOTE — RESPIRATORY THERAPY NOTE
RT Protocol Note  Banner Kristi 54 y o  male MRN: [id number]  Unit/Bed#: 5T -01 Encounter: [id number]    Assessment    Principal Problem:    Cellulitis of left forearm  Active Problems:    Heroin use disorder, mild, in sustained remission (HCC)    Moderate episode of recurrent major depressive disorder (HCC)    Substance abuse ([facility] )    Sepsis ([place] [address] )    Pneumonia    Acute drug withdrawal syndrome ([place] [address] )      Home Pulmonary Medications:  NONE       Past Medical History:   Diagnosis Date    Drug abuse ([place] [address] )     Hepatitis C virus     Sleep apnea      Social History     Socioeconomic History    Marital status: Single     Spouse name: None    Number of children: None    Years of education: None    Highest education level: None   Occupational History    None   Social Needs    Financial resource strain: None    Food insecurity:     Worry: None     Inability: None    Transportation needs:     Medical: None     Non-medical: None   Tobacco Use    Smoking status: Current Every Day Smoker     Packs/day: 0 50     Types: Cigarettes    Smokeless tobacco: Never Used   Substance and Sexual Activity    Alcohol use: Not Currently    Drug use: Yes     Types: Heroin     Comment: k2    Sexual activity: None   Lifestyle    Physical activity:     Days per week: None     Minutes per session: None    Stress: None   Relationships    Social connections:     Talks on phone: None     Gets together: None     Attends Sabianist service: None     Active member of club or organization: None     Attends meetings of clubs or organizations: None     Relationship status: None    Intimate partner violence:     Fear of current or ex partner: None     Emotionally abused: None     Physically abused: None     Forced sexual activity: None   Other Topics Concern    None   Social History Narrative    None       Subjective         Objective    Physical Exam:   BS CLEAR AND SLIGHTLY DECREASED   SAT 96% ON ROOM AIR    Vitals:  Blood pressure 108/59, pulse 95, temperature 99 °F (37 2 °C), temperature source Temporal, resp  rate 18, height 5' 2" (1 575 m), weight 64 1 kg (141 lb 5 oz), SpO2 96 %  Imaging and other studies:    IMPRESSION:     No acute cardiopulmonary disease            Plan XOPENEX PRN    Respiratory Plan: No distress/Pulmonary history

## 2019-09-23 NOTE — SEPSIS NOTE
Sepsis Note   John Reveles 54 y o  male MRN: [id number]  Unit/Bed#: 5T -01 Encounter: [id number]    qSOFA     Row Name 09/22/19 2207 09/22/19 2145 09/22/19 2130 09/22/19 2115 09/22/19 2100    Altered mental status GCS < 15              Respiratory Rate > / =22  0  0  1  1  1    Systolic BP < / =499  0        0    Q Sofa Score  0  0  1  1  1    Row Name 09/22/19 2045 09/22/19 2037 09/22/19 2030 09/22/19 2015 09/22/19 2000    Altered mental status GCS < 15              Respiratory Rate > / =22  1  0  1  0  1    Systolic BP < / =221  0            Q Sofa Score  1  0  1  0  1    Row Name 09/22/19 1913 09/22/19 1904             Altered mental status GCS < 15  0         Respiratory Rate > / =22    1       Systolic BP < / =670    0       Q Sofa Score  1  1           Initial Sepsis Screening     Row Name 09/22/19 2200 09/22/19 2136             Is the patient's history suggestive of a new or worsening infection? (!) Yes (Proceed)  -JG  (!) Yes (Proceed)  -NB       Suspected source of infection  soft tissue  -JG  pneumonia;soft tissue  -NB       Are two or more of the following signs & symptoms of infection both present and new to the patient? (!) Yes (Proceed)  -JG         Indicate SIRS criteria  Hyperthemia > 38 3C (100 9F); Leukocytosis (WBC > 23896 IJL); Tachycardia > 90 bpm  -JG  Hyperthemia > 38 3C (100 9F); Tachycardia > 90 bpm  -NB       If the answer is yes to both questions, suspicion of sepsis is present  [de-identified]         If severe sepsis is present AND tissue hypoperfusion perists in the hour after fluid resuscitation or lactate > 4, the patient meets criteria for SEPTIC SHOCK           Are any of the following organ dysfunction criteria present within 6 hours of suspected infection and SIRS criteria that are NOT considered to be chronic conditions?   No  -JG  No  -NB       Organ dysfunction           Date of presentation of severe sepsis           Time of presentation of severe sepsis           Tissue hypoperfusion persists in the hour after crystalloid fluid administration, evidenced, by either:           Was hypotension present within one hour of the conclusion of crystalloid fluid administration? Charlotte Mock       Date of presentation of septic shock           Time of presentation of septic shock             User Key  (r) = Recorded By, (t) = Taken By, (c) = Cosigned By    Initials Name Provider Type    Sehila Fatima PA-C Physician Assistant    HAROON Hooper DO Physician               Default Flowsheet Data (last 720 hours)      Sepsis Reassess     Row Name 09/22/19 9880                   Repeat Volume Status and Tissue Perfusion Assessment Performed    Repeat Volume Status and Tissue Perfusion Assessment Performed  Yes  -NB           Volume Status and Tissue Perfusion Post Fluid Resuscitation * Must Document All *    Vital Signs Reviewed (HR, RR, BP, T)  Yes  -NB        Shock Index Reviewed  Yes  -NB        Arterial Oxygen Saturation Reviewed (POx, SaO2 or SpO2)  Yes (comment %)  -NB        Cardio  Normal S1/S2; Regular rate and rhythm; No murmor  -NB        Pulmonary  Normal effort;Clear to auscultation  -NB        Capillary Refill  Brisk  -NB        Peripheral Pulses  Radial;Dorsalis Pedis  -NB        Peripheral Pulse  +2  -NB        Dorsalis Pedis  +2  -NB        Skin  Warm  -NB        Urine output assessed             *OR*   Intensive Monitoring- Must Document One of the Following Four *:    Vital Signs Reviewed          * Central Venous Pressure (CVP or RAP)          * Central Venous Oxygen (SVO2, ScvO2 or Oxygen saturation via central catheter)          * Bedside Cardiovascular US in IVC diameter and % collapse          * Passive Leg Raise OR Crystalloid Challenge            User Key  (r) = Recorded By, (t) = Taken By, (c) = Cosigned By    Initials Name Provider Type    HAROON Hooper DO Physician

## 2019-09-23 NOTE — ASSESSMENT & PLAN NOTE
Patient reports "feeling unwell" with erythema & tenderness of L forearm x 3 days  Patients is active IVDA with multiple injection sites noted  Plan to bib L forearm erythema margins; currently with no compartment tenderness or restrictions in ROM  LUE neurovasc intact with sensation intact all dermatomes & +2 radial pulse  Seems to be clinically improving    Continue vancomycin and Rocephin for now until blood cultures are negative for MRSA  Venous Doppler left upper extremity to be done today to rule out DVT

## 2019-09-23 NOTE — ASSESSMENT & PLAN NOTE
Patient reports "feeling unwell" with erythema & tenderness of L forearm x 3 days  Patients is active IVDA with multiple injection sites noted  Plan to bib L forearm erythema margins; currently with no compartment tenderness or restrictions in ROM  LUE neurovasc intact with sensation intact all dermatomes & +2 radial pulse  Q 2 hour neurovasc checks  Wash with soap & water daily  Check Duplex LUE to R/O DVT  Vancomycin IV x 1 given 9/22 @ 11 pm to cover possible MRSA  MRSA swab pending  BC x 2 pending  Patient verbally consents for me to order acute hepatitis panel & HIV testing, at this time

## 2019-09-23 NOTE — ASSESSMENT & PLAN NOTE
--patient states that he uses 4-5 bags of heroin daily IV  He is not interested in drug rehab at this time

## 2019-09-23 NOTE — ASSESSMENT & PLAN NOTE
Currently on no prescription medication  He states he does suffer from mild to moderate depression but denies any homicidal or suicidal ideation    Will refer for outpatient follow-up with Psychiatry

## 2019-09-23 NOTE — ASSESSMENT & PLAN NOTE
Patient abuses heroin and K2    Currently going to active drug withdrawal   Continue supportive symptomatic management--

## 2019-09-23 NOTE — ASSESSMENT & PLAN NOTE
Secondary to L forearm cellulitis & possible pneumonia  Patient also actively withdrawing from heroin so clinical picture is not definitive  No signs of end organ dysfunction & lactic acid normal   s/p 2000 ml IVF bolus & NS now running @ 125 ml/hr  Plan to closely monitor LUE    F/U BC x 2 & UA/c+s

## 2019-09-23 NOTE — UTILIZATION REVIEW
Initial Clinical Review    Admission: Date/Time/Statement: Inpatient Admission Orders (From admission, onward)     Ordered        09/22/19 2120  Inpatient Admission (expected length of stay for this patient Order details is greater than two midnights)  Once                   Orders Placed This Encounter   Procedures    Inpatient Admission (expected length of stay for this patient Order details is greater than two midnights)     Standing Status:   Standing     Number of Occurrences:   1     Order Specific Question:   Admitting Physician     Answer:   Soila Moe [[id number]]     Order Specific Question:   Level of Care     Answer:   Med Surg [[id number]]     Order Specific Question:   Estimated length of stay     Answer:   More than 2 Midnights     Order Specific Question:   Certification     Answer:   I certify that inpatient services are medically necessary for this patient for a duration of greater than two midnights  See H&P and MD Progress Notes for additional information about the patient's course of treatment  ED Arrival Information     Expected Arrival Acuity Means of Arrival Escorted By Service Admission Type    - 9/22/2019 18:54 Emergent Walk-In Self General Medicine Emergency    Arrival Complaint    Detox Eval         Chief Complaint   Patient presents with    Drug Problem     IV heroin use, 4 bags a day, last used this morning  K2 using, last smoked 20 min PTA  Patient states he is homeless, hasn't eaten or showered in a long time, wants rehab  States he "sometimes" feels suicidal, doesn't have SI at this time  Denies HI, AH, VH  Assessment/Plan: 55 y/o male presents to ED from home with c/o generalized weakness, fatigue, nausea, abd discomfort, SOB  Admits to IV heroin and K2 abuse, and is asking for help with his addiction  Currently homeless  On exam, dry mucous membranes, tachycardia, wheezes, rales, LFA erythema and swelling, fluctuance, streaking, concern for abscess    Admitted as inpatient due to sepsis secondary to L forearm cellulitis, pneumonia, acute drug withdrawal   Continue IVF, IV antibiotics  Blood cx pending  Q2h neurovascular checks  UE venous duplex  Psych consult  9/23 Cellulitis improving on IV antibiotics  Atypical pneumonia vs aspiration pneumonia  Chest PT and pulm toilet  Continued body aches, diaphoresis, abd cramping, nausea  Continue symptomatic management      ED Triage Vitals [09/22/19 1904]   Temperature Pulse Respirations Blood Pressure SpO2   100 °F (37 8 °C) (!) 110 22 107/62 93 %      Temp Source Heart Rate Source Patient Position - Orthostatic VS BP Location FiO2 (%)   Tympanic Monitor Sitting Left arm --      Pain Score       9        Wt Readings from Last 1 Encounters:   09/23/19 69 4 kg (153 lb)     Additional Vital Signs:   09/23/19 1454    144/94     09/23/19 0743 97 °F (36 1 °C)Abnormal  72 20 132/98 97 % None (Room air)   09/23/19 0712  47Abnormal   142/97     09/22/19 2351 99 °F (37 2 °C) 95 18 108/59 94 % None (Room air)   09/22/19 2300 97 °F (36 1 °C)Abnormal  97 16 119/63 93 % None (Room air)   09/22/19 2207 96 6 °F (35 9 °C)Abnormal  85 18 112/64 94 % None (Room air)   09/22/19 2100  83 24Abnormal  109/63 91 % None (Room air)   09/22/19 2045  98 24Abnormal  109/75 95 % None (Room air)   09/22/19 2036 101 °F (38 3 °C)Abnormal             Pertinent Labs/Diagnostic Test Results:   Results from last 7 days   Lab Units 09/23/19  0957 09/22/19 1933   WBC Thousand/uL 7 20 12 70*   HEMOGLOBIN g/dL 14 9 16 2   HEMATOCRIT % 43 4 47 2   PLATELETS Thousands/uL 175 204   NEUTROS ABS Thousands/µL 4 90 9 90*     Results from last 7 days   Lab Units 09/23/19  0957 09/22/19 1933   SODIUM mmol/L 138 135*   POTASSIUM mmol/L 4 2 4 4   CHLORIDE mmol/L 107 98   CO2 mmol/L 25 29   ANION GAP mmol/L 6 8   BUN mg/dL 14 16   CREATININE mg/dL 0 60* 0 95   EGFR ml/min/1 73sq m 120 95   CALCIUM mg/dL 8 3* 9 0   MAGNESIUM mg/dL 2 4* 1 9   PHOSPHORUS mg/dL 2 4*  -- Results from last 7 days   Lab Units 09/23/19  0957 09/22/19  1933   AST U/L 19 23   ALT U/L 16 16   ALK PHOS U/L 81 75   TOTAL PROTEIN g/dL 7 6 9 0*   ALBUMIN g/dL 3 5 4 1   TOTAL BILIRUBIN mg/dL 0 80 2 10*     Results from last 7 days   Lab Units 09/23/19  0957 09/22/19  1933   GLUCOSE RANDOM mg/dL 141* 121*     Results from last 7 days   Lab Units 09/22/19  1933   CK TOTAL U/L 63     Results from last 7 days   Lab Units 09/23/19  0957 09/23/19  0154 09/22/19  2322 09/22/19  1933   TROPONIN I ng/mL <0 01 <0 01 <0 01 <0 01     Results from last 7 days   Lab Units 09/22/19  1933   TSH 3RD GENERATON uIU/mL 0 671     Results from last 7 days   Lab Units 09/22/19  2322   PROCALCITONIN ng/ml 0 66*     Results from last 7 days   Lab Units 09/22/19  1933   LACTIC ACID mmol/L 1 6     Results from last 7 days   Lab Units 09/22/19  1933   NT-PRO BNP pg/mL 137     Results from last 7 days   Lab Units 09/22/19 2322   HEP B S AG  Non-reactive   HEP C AB  High Reactive*   HEP B C IGM  Non-reactive     Results from last 7 days   Lab Units 09/22/19 1933   LIPASE u/L 25     Results from last 7 days   Lab Units 09/22/19 1932   CLARITY UA  Clear   COLOR UA  Brown*   SPEC GRAV UA  1 010   PH UA  6 0   GLUCOSE UA mg/dl Negative   KETONES UA mg/dl 15 (1+)*   BLOOD UA  Negative   PROTEIN UA mg/dl 15 (Trace)*   NITRITE UA  Negative   BILIRUBIN UA  1 mg/dL*   UROBILINOGEN UA mg/dL 12 0*   LEUKOCYTES UA  Negative   WBC UA /hpf 0-1*   RBC UA /hpf None Seen   BACTERIA UA /hpf Occasional   EPITHELIAL CELLS WET PREP /hpf Occasional     Results from last 7 days   Lab Units 09/23/19  0924 09/22/19  1932   STREP PNEUMONIAE ANTIGEN, URINE   --  Negative   LEGIONELLA URINARY ANTIGEN  Negative  --      Results from last 7 days   Lab Units 09/22/19 1932   AMPH/METH  Negative   BARBITURATE UR  Negative   BENZODIAZEPINE UR  Negative   COCAINE UR  Negative   METHADONE URINE  Negative   OPIATE UR  Positive*   PCP UR  Negative   THC UR  Positive* Results from last 7 days   Lab Units 09/22/19  1933   ETHANOL LVL mg/dL <10     9/22 EKG:  Normal sinus rhythm  9/23 EKG:  Sinus bradycardia with sinus arrhythmia    9/22 CXR:  No acute cardiopulmonary disease  9/22 Xray LFA:  No acute osseous abnormality  9/22 CT chest:  Very mild peripheral mild tree-in-bud opacities compatible which may be seen with atypical infection    Scattered very mild tree-in-bud opacities are best seen within the peripheral anterior right middle lobe and right upper lobe     UE venous duplex pending    ED Treatment:   Medication Administration from 09/22/2019 1854 to 09/22/2019 2204       Date/Time Order Dose Route Action     09/22/2019 1945 sodium chloride 0 9 % bolus 1,000 mL 1,000 mL Intravenous New Bag     09/22/2019 1943 ipratropium-albuterol (DUO-NEB) 0 5-2 5 mg/3 mL inhalation solution 3 mL 3 mL Nebulization Given     09/22/2019 2112 sodium chloride 0 9 % bolus 1,000 mL 1,000 mL Intravenous New Bag     09/22/2019 1958 acetaminophen (TYLENOL) tablet 650 mg 650 mg Oral Given     09/22/2019 2040 ibuprofen (MOTRIN) tablet 400 mg 400 mg Oral Given     09/22/2019 2126 cefTRIAXone (ROCEPHIN) IVPB (premix) 1,000 mg 1,000 mg Intravenous New Bag        Past Medical History:   Diagnosis Date    Drug abuse ([place] [address] )     Hepatitis C virus     Sleep apnea      Present on Admission:   Substance abuse ([place] [address] )   Cellulitis of left forearm   Heroin use disorder, severe, dependence (HCC)   Moderate episode of recurrent major depressive disorder ([place] [address] )   Sepsis ([place] [address] )   Pneumonia   Acute drug withdrawal syndrome ([place] [address] )      Admitting Diagnosis: Substance abuse ([place] [address] ) [F19 10]  Lung infection [J18 9]  Hepatitis C [B19 20]  Fever [R50 9]  Drug use [F19 90]  Cellulitis of left forearm [I34 837]  Heroin abuse ([place] [address] ) [F11 10]  Age/Sex: 54 y o  male  Admission Orders:    Current Facility-Administered Medications:  acetaminophen 650 mg Oral Q6H [facility]   azithromycin 500 mg Intravenous Q24H   cefTRIAXone 1,000 mg Intravenous Q24H   cloNIDine 0 1 mg Oral Q8H [address]   diazepam 5 mg Intravenous Q6H PRN   enoxaparin 40 mg Subcutaneous Daily   hydrOXYzine HCL 50 mg Oral Q6H PRN   ibuprofen 600 mg Oral Q6H PRN   levalbuterol 1 25 mg Nebulization Q6H PRN   loperamide 4 mg Oral TID PRN   nicotine 1 patch Transdermal Daily   ondansetron 4 mg Intravenous Q6H PRN   pneumococcal 13-valent conjugate vaccine 0 5 mL Intramuscular Prior to discharge   sodium chloride 125 mL/hr Intravenous Continuous       IP CONSULT TO CASE MANAGEMENT  IP CONSULT TO PHARMACY   Psych consult  NC O2  Wound care  SCDs  VS  Elevate HOB  Aspiration precautions  Sputum cx  PT/OT    Network Utilization Review Department  Phone: [phone]; Fax [phone]  [email or web address]  ATTENTION: Please call with any questions or concerns to [phone]  and carefully listen to the prompts so that you are directed to the right person  Send all requests for admission clinical reviews, approved or denied determinations and any other requests to fax [phone]   All voicemails are confidential

## 2019-09-23 NOTE — SEPSIS NOTE
Sepsis Note   Darby Cummings 54 y o  male MRN: [id number]  Unit/Bed#: ED 03 Encounter: [id number]      qSOFA     Row Name 09/22/19 2115 09/22/19 2100 09/22/19 2045 09/22/19 2037 09/22/19 2030    Altered mental status GCS < 15              Respiratory Rate > / =22  1  1  1  0  1    Systolic BP < / =699    0  0        Q Sofa Score  1  1  1  0  1    Row Name 09/22/19 2015 09/22/19 2000 09/22/19 1913 09/22/19 1904       Altered mental status GCS < 15      0       Respiratory Rate > / =22  0  1    1     Systolic BP < / =270        0     Q Sofa Score  0  1  1  1         Initial Sepsis Screening     Row Name 09/22/19 2136                Is the patient's history suggestive of a new or worsening infection? (!) Yes (Proceed)  -NB        Suspected source of infection  pneumonia;soft tissue  -NB        Are two or more of the following signs & symptoms of infection both present and new to the patient?         Indicate SIRS criteria  Hyperthemia > 38 3C (100 9F); Tachycardia > 90 bpm  -NB        If the answer is yes to both questions, suspicion of sepsis is present          If severe sepsis is present AND tissue hypoperfusion perists in the hour after fluid resuscitation or lactate > 4, the patient meets criteria for SEPTIC SHOCK          Are any of the following organ dysfunction criteria present within 6 hours of suspected infection and SIRS criteria that are NOT considered to be chronic conditions? No  -NB        Organ dysfunction          Date of presentation of severe sepsis          Time of presentation of severe sepsis          Tissue hypoperfusion persists in the hour after crystalloid fluid administration, evidenced, by either:          Was hypotension present within one hour of the conclusion of crystalloid fluid administration?           Date of presentation of septic shock          Time of presentation of septic shock            User Key  (r) = Recorded By, (t) = Taken By, (c) = Cosigned By    [address] Name Provider Type    NB Yandy Thompson DO Physician

## 2019-09-23 NOTE — ASSESSMENT & PLAN NOTE
Patient reports recent heroin & K2 usage, reports last usage was in the am 9/21/2019  Patient currently has realized body aches and pains, diaphoresis, cramps, nausea  Symptomatic medication management of acute withdrawal syndrome ordered, and to be used as needed  Close observation  Patient states he has tried multiple rehabs in the past and states that they do not help him  He feels the only thing that helps him is being regularly compliant with going to [employer]  He also looks forward to going to live with his brother in the [facility] and states that he must be clean of drugs before he can go    He has refused any rehab services at this time

## 2019-09-23 NOTE — PLAN OF CARE
Problem: Nutrition/Hydration-ADULT  Goal: Nutrient/Hydration intake appropriate for improving, restoring or maintaining nutritional needs  Description  Monitor and assess patient's nutrition/hydration status for malnutrition  Collaborate with interdisciplinary team and initiate plan and interventions as ordered  Monitor patient's weight and dietary intake as ordered or per policy  Utilize nutrition screening tool and intervene as necessary  Determine patient's food preferences and provide high-protein, high-caloric foods as appropriate       INTERVENTIONS:  - Monitor oral intake, urinary output, labs, and treatment plans  - Assess nutrition and hydration status and recommend course of action  - Evaluate amount of meals eaten  - Assist patient with eating if necessary   - Allow adequate time for meals  - Recommend/ encourage appropriate diets, oral nutritional supplements, and vitamin/mineral supplements  - Order, calculate, and assess calorie counts as needed  - Recommend, monitor, and adjust tube feedings and TPN/PPN based on assessed needs  - Assess need for intravenous fluids  - Provide specific nutrition/hydration education as appropriate  - Include patient/family/caregiver in decisions related to nutrition  Outcome: Progressing     Problem: PAIN - ADULT  Goal: Verbalizes/displays adequate comfort level or baseline comfort level  Description  Interventions:  - Encourage patient to monitor pain and request assistance  - Assess pain using appropriate pain scale  - Administer analgesics based on type and severity of pain and evaluate response  - Implement non-pharmacological measures as appropriate and evaluate response  - Consider cultural and social influences on pain and pain management  - Notify physician/advanced practitioner if interventions unsuccessful or patient reports new pain  Outcome: Progressing     Problem: INFECTION - ADULT  Goal: Absence or prevention of progression during hospitalization  Description  INTERVENTIONS:  - Assess and monitor for signs and symptoms of infection  - Monitor lab/diagnostic results  - Monitor all insertion sites, i e  indwelling lines, tubes, and drains  - Monitor endotracheal if appropriate and nasal secretions for changes in amount and color  - [place] appropriate cooling/warming therapies per order  - Administer medications as ordered  - Instruct and encourage patient and family to use good hand hygiene technique  - Identify and instruct in appropriate isolation precautions for identified infection/condition  Outcome: Progressing  Goal: Absence of fever/infection during neutropenic period  Description  INTERVENTIONS:  - Monitor WBC    Outcome: Progressing     Problem: SAFETY ADULT  Goal: Patient will remain free of falls  Description  INTERVENTIONS:  - Assess patient frequently for physical needs  -  Identify cognitive and physical deficits and behaviors that affect risk of falls    -  [place] fall precautions as indicated by assessment   - Educate patient/family on patient safety including physical limitations  - Instruct patient to call for assistance with activity based on assessment  - Modify environment to reduce risk of injury  - Consider OT/PT consult to assist with strengthening/mobility  Outcome: Progressing  Goal: Maintain or return to baseline ADL function  Description  INTERVENTIONS:  -  Assess patient's ability to carry out ADLs; assess patient's baseline for ADL function and identify physical deficits which impact ability to perform ADLs (bathing, care of mouth/teeth, toileting, grooming, dressing, etc )  - Assess/evaluate cause of self-care deficits   - Assess range of motion  - Assess patient's mobility; develop plan if impaired  - Assess patient's need for assistive devices and provide as appropriate  - Encourage maximum independence but intervene and supervise when necessary  - Involve family in performance of ADLs  - Assess for home care needs following discharge   - Consider OT consult to assist with ADL evaluation and planning for discharge  - Provide patient education as appropriate  Outcome: Progressing  Goal: Maintain or return mobility status to optimal level  Description  INTERVENTIONS:  - Assess patient's baseline mobility status (ambulation, transfers, stairs, etc )    - Identify cognitive and physical deficits and behaviors that affect mobility  - Identify mobility aids required to assist with transfers and/or ambulation (gait belt, sit-to-stand, lift, walker, cane, etc )  - [place] fall precautions as indicated by assessment  - Record patient progress and toleration of activity level on Mobility SBAR; progress patient to next Phase/Stage  - Instruct patient to call for assistance with activity based on assessment  - Consider rehabilitation consult to assist with strengthening/weightbearing, etc   Outcome: Progressing     Problem: DISCHARGE PLANNING  Goal: Discharge to home or other facility with appropriate resources  Description  INTERVENTIONS:  - Identify barriers to discharge w/patient and caregiver  - Arrange for needed discharge resources and transportation as appropriate  - Identify discharge learning needs (meds, wound care, etc )  - Arrange for interpretive services to assist at discharge as needed  - Refer to Case Management Department for coordinating discharge planning if the patient needs post-hospital services based on physician/advanced practitioner order or complex needs related to functional status, cognitive ability, or social support system  Outcome: Progressing     Problem: Knowledge Deficit  Goal: Patient/family/caregiver demonstrates understanding of disease process, treatment plan, medications, and discharge instructions  Description  Complete learning assessment and assess knowledge base    Interventions:  - Provide teaching at level of understanding  - Provide teaching via preferred learning methods  Outcome: Progressing

## 2019-09-23 NOTE — PROGRESS NOTES
Vancomycin Assessment    Junior II Marely Washington is a 54 y o  male who is currently receiving vancomycin 1,000 mg q8h for skin-soft tissue infection     Relevant clinical data and objective history reviewed:  Creatinine   Date Value Ref Range Status   09/23/2019 0 60 (L) 0 70 - 1 50 mg/dL Final     Comment:     Standardized to IDMS reference method   09/22/2019 0 95 0 70 - 1 50 mg/dL Final     Comment:     Standardized to IDMS reference method     /80 (BP Location: Right arm)   Pulse 57   Temp 97 5 °F (36 4 °C) (Temporal)   Resp 20   Ht 5' 2" (1 575 m)   Wt 69 4 kg (153 lb)   SpO2 97%   BMI 27 98 kg/m²   I/O last 3 completed shifts: In: 5949 [P O :720; IV Piggyback:2050]  Out: -   Lab Results   Component Value Date/Time    BUN 14 09/23/2019 09:57 AM    WBC 7 20 09/23/2019 09:57 AM    HGB 14 9 09/23/2019 09:57 AM    HCT 43 4 09/23/2019 09:57 AM    MCV 93 09/23/2019 09:57 AM     09/23/2019 09:57 AM     Temp Readings from Last 3 Encounters:   09/23/19 97 5 °F (36 4 °C) (Temporal)   09/01/18 98 1 °F (36 7 °C) (Oral)     Vancomycin Days of Therapy: 2    Assessment/Plan  The patient is currently on vancomycin utilizing scheduled dosing based on actual body weight  The patient is currently receiving 1,000 mg q8h and is clinically appropriate and dose will be continued  Pharmacy will also follow closely for s/sx of nephrotoxicity, infusion reactions and appropriateness of therapy  BMP and CBC will be ordered per protocol  Plan for trough as patient approaches steady state, prior to the 4th  dose at approximately 1700 on 9/24/19   Due to infection severity, will target a trough of 15-20 (appropriate for most indications)   Pharmacy will continue to follow the patients culture results and clinical progress daily      Shea Sheffield, Pharmacist

## 2019-09-23 NOTE — PROGRESS NOTES
Progress Note - Anice Sic 1970, 54 y o  male MRN: [id number]    Unit/Bed#: 5T -01 Encounter: [id number]    Primary Care Provider: Kemi Collins MD   Date and time admitted to hospital: 9/22/2019  7:00 PM        Sepsis [facility])  Assessment & Plan  Secondary to L forearm cellulitis & possible pneumonia  Patient also actively withdrawing from heroin so clinical picture is not definitive  No signs of end organ dysfunction & lactic acid normal   s/p 2000 ml IVF bolus & NS now running @ 125 ml/hr  Plan to closely monitor LUE  F/U BC x 2 & UA/c+s      * Cellulitis of left forearm  Assessment & Plan  Patient reports "feeling unwell" with erythema & tenderness of L forearm x 3 days  Patients is active IVDA with multiple injection sites noted  Plan to bib L forearm erythema margins; currently with no compartment tenderness or restrictions in ROM  LUE neurovasc intact with sensation intact all dermatomes & +2 radial pulse  Seems to be clinically improving  Continue vancomycin and Rocephin for now until blood cultures are negative for MRSA  Venous Doppler left upper extremity to be done today to rule out DVT      Pneumonia  Assessment & Plan  Patient has atypical pneumonia versus aspiration pneumonia  CT Chest reviewed  Procalcitonin level is elevated  Ceftriaxone & Azithromycin IV for now  Chest Physiotherapy & Pulmonary toilet  O2 NC to keep O2 sat > 92%  Xoponex nebs, as needed    Acute drug withdrawal syndrome [facility])  Assessment & Plan  Patient reports recent heroin & K2 usage, reports last usage was in the am 9/21/2019  Patient currently has realized body aches and pains, diaphoresis, cramps, nausea  Symptomatic medication management of acute withdrawal syndrome ordered, and to be used as needed  Close observation    Patient states he has tried multiple rehabs in the past and states that they do not help him  He feels the only thing that helps him is being regularly compliant with going to [facility]  He also looks forward to going to live with his brother in the [facility] and states that he must be clean of drugs before he can go  He has refused any rehab services at this time    Substance abuse [facility])  Assessment & Plan  Patient abuses heroin and K2  Currently going to active drug withdrawal   Continue supportive symptomatic management--    Moderate episode of recurrent major depressive disorder [facility])  Assessment & Plan  Currently on no prescription medication  He states he does suffer from mild to moderate depression but denies any homicidal or suicidal ideation  Will refer for outpatient follow-up with Psychiatry    Heroin use disorder, severe, dependence ([facility] [address] )  Assessment & Plan  --patient states that he uses 4-5 bags of heroin daily IV  He is not interested in drug rehab at this time      VTE Pharmacologic Prophylaxis:   Pharmacologic: Enoxaparin (Lovenox)  Mechanical VTE Prophylaxis in Place: Yes    Patient Centered Rounds: I have performed bedside rounds with nursing staff today  Discussions with Specialists or Other Care Team Provider:  None    Education and Discussions with Family / Patient:  Discussed with patient at bedside about hospital course    Time Spent for Care: 30 minutes  More than 50% of total time spent on counseling and coordination of care as described above  Current Length of Stay: 1 day(s)    Current Patient Status: Inpatient   Certification Statement: The patient will continue to require additional inpatient hospital stay due to Left arm cellulitis    Discharge Plan:  Pending progress  Patient to be discharged with his brother once medically cleared    Code Status: Level 1 - Full Code      Subjective:   Patient complains of having generalized body aches and feeling very sweaty and nauseous  He does not have much of an appetite  Complains of going thru active drug withdrawal at this time    He states the pain in his left arm is starting to decrease and is now down to 3 to 4/10    Objective:     Vitals:   Temp (24hrs), Av 7 °F (37 1 °C), Min:96 6 °F (35 9 °C), Max:101 °F (38 3 °C)    Temp:  [96 6 °F (35 9 °C)-101 °F (38 3 °C)] 97 °F (36 1 °C)  HR:  [] 72  Resp:  [15-71] 20  BP: (107-142)/(59-98) 132/98  SpO2:  [90 %-99 %] 97 %  Body mass index is 27 98 kg/m²  Input and Output Summary (last 24 hours): Intake/Output Summary (Last 24 hours) at 2019 1109  Last data filed at 2019 5019  Gross per 24 hour   Intake 2770 ml   Output    Net 2770 ml       Physical Exam:     Physical Exam   Constitutional: He is oriented to person, place, and time  He appears well-developed  He appears distressed  HENT:   Head: Normocephalic and atraumatic  Right Ear: External ear normal    Left Ear: External ear normal    Mouth/Throat: Oropharynx is clear and moist    Eyes: Pupils are equal, round, and reactive to light  Conjunctivae and EOM are normal    Neck: Normal range of motion  Neck supple  Cardiovascular: Normal rate, regular rhythm, normal heart sounds and intact distal pulses  Pulmonary/Chest: Effort normal and breath sounds normal    Abdominal: Soft  Bowel sounds are normal  He exhibits no mass  There is no tenderness  There is no rebound and no guarding  Genitourinary:   Genitourinary Comments: deferred   Musculoskeletal:   Left arm swelling and mild erythema and redness noted  Tenderness noted around the wrist area with a lump noted as well  Neurological: He is alert and oriented to person, place, and time  He has normal reflexes  Cranial nerves 2-12 are normal   Normal neurological exam   Skin: Skin is warm  No rash noted  He is diaphoretic  Psychiatric:   Anxious   Nursing note and vitals reviewed          Additional Data:     Labs:    Results from last 7 days   Lab Units 19  0957   WBC Thousand/uL 7 20   HEMOGLOBIN g/dL 14 9   HEMATOCRIT % 43 4   PLATELETS Thousands/uL 175   NEUTROS PCT % 67*   LYMPHS PCT % 21*   MONOS PCT % 10   EOS PCT % 1     Results from last 7 days   Lab Units 09/22/19  1933   SODIUM mmol/L 135*   POTASSIUM mmol/L 4 4   CHLORIDE mmol/L 98   CO2 mmol/L 29   BUN mg/dL 16   CREATININE mg/dL 0 95   ANION GAP mmol/L 8   CALCIUM mg/dL 9 0   ALBUMIN g/dL 4 1   TOTAL BILIRUBIN mg/dL 2 10*   ALK PHOS U/L 75   ALT U/L 16   AST U/L 23   GLUCOSE RANDOM mg/dL 121*                 Results from last 7 days   Lab Units 09/22/19  2322 09/22/19  1933   LACTIC ACID mmol/L  --  1 6   PROCALCITONIN ng/ml 0 66*  --            * I Have Reviewed All Lab Data Listed Above  * Additional Pertinent Lab Tests Reviewed:  Shawn 66 Admission Reviewed    Imaging:    Imaging Reports Reviewed Today Include:  CT chest  Imaging Personally Reviewed by Myself Includes:  CT chest    Recent Cultures (last 7 days):     Results from last 7 days   Lab Units 09/23/19  0924   LEGIONELLA URINARY ANTIGEN  Negative       Last 24 Hours Medication List:     Current Facility-Administered Medications:  acetaminophen 650 mg Oral Q6H [address] Jewel Coon PA-C    azithromycin 500 mg Intravenous Q24H Jewel Coon PA-C Last Rate: 500 mg (09/23/19 0126)   cefTRIAXone 1,000 mg Intravenous Q24H Jewel Coon PA-C    cloNIDine 0 1 mg Oral Q8H [address] Reddy Bro PA-C    diazepam 5 mg Intravenous Q6H PRN Jewel Coon PA-C    enoxaparin 40 mg Subcutaneous Daily Reddy Bro PA-C    hydrOXYzine HCL 50 mg Oral Q6H PRN Jewel Coon PA-C    ibuprofen 600 mg Oral Q6H PRN Jewel Coon PA-C    levalbuterol 1 25 mg Nebulization Q6H PRN Jewel Coon PA-C    loperamide 4 mg Oral TID PRN Jewel Coon PA-C    nicotine 1 patch Transdermal Daily Jewel Coon PA-C    ondansetron 4 mg Intravenous Q6H PRN Jewel Coon PA-C    pneumococcal 13-valent conjugate vaccine 0 5 mL Intramuscular Prior to discharge Elizabeth Murdock MD    sodium chloride 125 mL/hr Intravenous Continuous Jewel Coon PA-C Last Rate: 125 mL/hr (09/22/19 0537)   vancomycin 20 mg/kg Intravenous Once [facility] London Collins MD         Today, Patient Was Seen By: Padmini Vincent MD    ** Please Note: Dictation voice to text software may have been used in the creation of this document   **

## 2019-09-23 NOTE — ED NOTES
EVS (Eligibility Verification System) called - [phone]    Automated system indicates: not eligible     Spoke with Rony Doyle from [place], coverage termed 7/31/19

## 2019-09-23 NOTE — PHYSICAL THERAPY NOTE
Orders for PT evaluation received  Cleared by nursing for evaluation if patient agreeable  Pt declines PT evaluation at this time  Upon entry to room patient was sleeping, easily aroused to name but declined PT evaluation at this time due to being tired  Will follow up at a later time today as schedule permits      Sara Mccain, PT

## 2019-09-23 NOTE — OCCUPATIONAL THERAPY NOTE
OCCUPATIONAL THERAPY CANCELLATION     OT orders received and chart reviewed  RN cleared for evaluation but reports pt is diaphoretic and going through withdrawal  Pt sleeping upon entry w/ minimal conversation w/ therapy  Reports being tired and declined OT eval at this time  Will perform OT evaluation at a later time       Syd Fitzgerald MS, OTR/L

## 2019-09-23 NOTE — SOCIAL WORK
LOS 1 GMLOS none  Patient is not a 30 day readmit or bundled    Patient admitted for cellulitis of LFA, secondary to IV heroin use   Pt is currently being treated with IV ABX  CM in to see patient for assessment/consultation, patient currently is lying in bed with the blankets pulled up over his head and not responding when is not responding when CM calls his name  Attending made CM aware that patient is declining HOST program at this time  CM will attempt to meet with patient at another time

## 2019-09-23 NOTE — ASSESSMENT & PLAN NOTE
Patient reports recent heroin & K2 usage, reports last usage was in the am 9/21/2019  Patient currently with mild anxiety, diaphoresis, and abdominal cramping that patient reports as "how my withdrawal starts"  Symptomatic medication management of acute withdrawal syndrome ordered, and to be used as needed  Close observation    Psych consult pending- patient requesting rehab services

## 2019-09-23 NOTE — ASSESSMENT & PLAN NOTE
R/O pneumonia  CT Chest reviewed  Patient without tachypnea or hypoxia at this time  Patient denies cough or pulmonary secretions at this time  Procalcitonin pending  Ceftriaxone & Azithromycin IV for now    Chest Physiotherapy & Pulmonary toilet  O2 NC to keep O2 sat > 92%  Xoponex nebs, as needed

## 2019-09-24 LAB
ANION GAP SERPL CALCULATED.3IONS-SCNC: 5 MMOL/L (ref 5–14)
BUN SERPL-MCNC: 9 MG/DL (ref 5–25)
CALCIUM SERPL-MCNC: 8.6 MG/DL (ref 8.4–10.2)
CHLORIDE SERPL-SCNC: 108 MMOL/L (ref 97–108)
CO2 SERPL-SCNC: 25 MMOL/L (ref 22–30)
CREAT SERPL-MCNC: 0.59 MG/DL (ref 0.7–1.5)
GFR SERPL CREATININE-BSD FRML MDRD: 121 ML/MIN/1.73SQ M
GLUCOSE SERPL-MCNC: 104 MG/DL (ref 70–99)
MRSA NOSE QL CULT: NORMAL
POTASSIUM SERPL-SCNC: 4 MMOL/L (ref 3.6–5)
SODIUM SERPL-SCNC: 138 MMOL/L (ref 137–147)

## 2019-09-24 PROCEDURE — G8987 SELF CARE CURRENT STATUS: HCPCS

## 2019-09-24 PROCEDURE — 80048 BASIC METABOLIC PNL TOTAL CA: CPT | Performed by: FAMILY MEDICINE

## 2019-09-24 PROCEDURE — G8980 MOBILITY D/C STATUS: HCPCS

## 2019-09-24 PROCEDURE — 97166 OT EVAL MOD COMPLEX 45 MIN: CPT

## 2019-09-24 PROCEDURE — G8978 MOBILITY CURRENT STATUS: HCPCS

## 2019-09-24 PROCEDURE — 87205 SMEAR GRAM STAIN: CPT | Performed by: PHYSICIAN ASSISTANT

## 2019-09-24 PROCEDURE — 0H9EXZZ DRAINAGE OF LEFT LOWER ARM SKIN, EXTERNAL APPROACH: ICD-10-PCS | Performed by: SURGERY

## 2019-09-24 PROCEDURE — G8979 MOBILITY GOAL STATUS: HCPCS

## 2019-09-24 PROCEDURE — G8988 SELF CARE GOAL STATUS: HCPCS

## 2019-09-24 PROCEDURE — 87070 CULTURE OTHR SPECIMN AEROBIC: CPT | Performed by: PHYSICIAN ASSISTANT

## 2019-09-24 PROCEDURE — 97163 PT EVAL HIGH COMPLEX 45 MIN: CPT

## 2019-09-24 PROCEDURE — 99232 SBSQ HOSP IP/OBS MODERATE 35: CPT | Performed by: FAMILY MEDICINE

## 2019-09-24 RX ORDER — AZITHROMYCIN 250 MG/1
500 TABLET, FILM COATED ORAL EVERY 24 HOURS
Status: DISCONTINUED | OUTPATIENT
Start: 2019-09-24 | End: 2019-09-25 | Stop reason: HOSPADM

## 2019-09-24 RX ORDER — KETOROLAC TROMETHAMINE 30 MG/ML
15 INJECTION, SOLUTION INTRAMUSCULAR; INTRAVENOUS EVERY 6 HOURS PRN
Status: DISCONTINUED | OUTPATIENT
Start: 2019-09-24 | End: 2019-09-25 | Stop reason: HOSPADM

## 2019-09-24 RX ADMIN — ACETAMINOPHEN 650 MG: 325 TABLET ORAL at 17:00

## 2019-09-24 RX ADMIN — Medication 5 MG: at 22:45

## 2019-09-24 RX ADMIN — KETOROLAC TROMETHAMINE 15 MG: 30 INJECTION, SOLUTION INTRAMUSCULAR; INTRAVENOUS at 17:43

## 2019-09-24 RX ADMIN — CLONIDINE HYDROCHLORIDE 0.1 MG: 0.1 TABLET ORAL at 06:19

## 2019-09-24 RX ADMIN — AZITHROMYCIN MONOHYDRATE 500 MG: 500 INJECTION, POWDER, LYOPHILIZED, FOR SOLUTION INTRAVENOUS at 00:48

## 2019-09-24 RX ADMIN — SODIUM CHLORIDE 125 ML/HR: 0.9 INJECTION, SOLUTION INTRAVENOUS at 05:13

## 2019-09-24 RX ADMIN — ENOXAPARIN SODIUM 40 MG: 40 INJECTION SUBCUTANEOUS at 08:20

## 2019-09-24 RX ADMIN — KETOROLAC TROMETHAMINE 15 MG: 30 INJECTION, SOLUTION INTRAMUSCULAR; INTRAVENOUS at 12:09

## 2019-09-24 RX ADMIN — CLONIDINE HYDROCHLORIDE 0.1 MG: 0.1 TABLET ORAL at 14:41

## 2019-09-24 RX ADMIN — VANCOMYCIN HYDROCHLORIDE 1000 MG: 1 INJECTION, SOLUTION INTRAVENOUS at 02:14

## 2019-09-24 RX ADMIN — CEFTRIAXONE 1000 MG: 1 INJECTION, SOLUTION INTRAVENOUS at 22:44

## 2019-09-24 RX ADMIN — CLONIDINE HYDROCHLORIDE 0.1 MG: 0.1 TABLET ORAL at 22:43

## 2019-09-24 RX ADMIN — ACETAMINOPHEN 650 MG: 325 TABLET ORAL at 06:19

## 2019-09-24 RX ADMIN — HYDROXYZINE HYDROCHLORIDE 50 MG: 50 TABLET, FILM COATED ORAL at 17:43

## 2019-09-24 RX ADMIN — ACETAMINOPHEN 650 MG: 325 TABLET ORAL at 11:13

## 2019-09-24 RX ADMIN — AZITHROMYCIN 500 MG: 250 TABLET, FILM COATED ORAL at 11:13

## 2019-09-24 NOTE — OCCUPATIONAL THERAPY NOTE
[address] Evaluation     Patient Name: Wilmar Willis  [id number] Date: 9/24/2019  Problem List  Principal Problem:    Cellulitis of left forearm  Active Problems:    Heroin use disorder, severe, dependence (HCC)    Moderate episode of recurrent major depressive disorder ([facility] [address] )    Substance abuse ([facility] )    Sepsis ([facility] )    Pneumonia    Acute drug withdrawal syndrome ([facility] )    Past Medical History  Past Medical History:   Diagnosis Date    Drug abuse ([facility] )     Hepatitis C virus     Sleep apnea      Past Surgical History  Past Surgical History:   Procedure Laterality Date    CYST REMOVAL      ELBOW SURGERY        Time: [id number] 09/24/19 0953   Note Type   Note type Eval only   Restrictions/Precautions   Weight Bearing Precautions Per Order No   Other Precautions Pain   Pain Assessment   Pain Assessment FLACC   Pain Rating: FLACC (Rest) - Face 0   Pain Rating: FLACC (Rest) - Legs 0   Pain Rating: FLACC (Rest) - Activity 0   Pain Rating: FLACC (Rest) - Cry 0   Pain Rating: FLACC (Rest) - Consolability 0   Score: FLACC (Rest) 0   Pain Rating: FLACC (Activity) - Face 0   Pain Rating: FLACC (Activity) - Legs 0   Pain Rating: FLACC (Activity) - Activity 0   Pain Rating: FLACC (Activity) - Cry 0   Pain Rating: FLACC (Activity) - Consolability 0   Score: FLACC (Activity) 0   Home Living   Additional Comments pt reports living on the streets and does not have a place to stay  Prior Function   Level of [place] Independent with ADLs and functional mobility   Lives With Alone   Receives Help From   (no family/friend support )   ADL Assistance Independent   IADLs   (did not complete)   Falls in the last 6 months 0   Vocational   (not working)   Comments homeless, decreased environmental setup  Patient reports this is his first time seeking help     Lifestyle   Autonomy PTA pt was indep with ADLs and functional mobility   Reciprocal Relationships no supports, was staying w/ a friend in the parking lot and friend recently left him   Psychosocial   Psychosocial (WDL) WDL   Subjective   Subjective This is my first time seeking help   ADL   Eating Assistance 7  Independent   Grooming Assistance 7  Independent   UB Bathing Assistance 7  Independent   LB Bathing Assistance 7  Independent   UB Dressing Assistance 7  Independent   LB Dressing Assistance 7  [address]  7  Independent   Bed Mobility   Supine to Sit 7  Independent   Sit to Supine 7  Independent   Transfers   Sit to Stand 7  Independent   Stand to Sit 7  Independent   Stand pivot 7  Independent   Functional Mobility   Functional Mobility 7  Independent   Additional items   (pushed IV pole, then used no AD)   Balance   Static Sitting Good   Dynamic Sitting Good   Static Standing Good   Dynamic Standing Good   Activity Tolerance   Activity Tolerance Patient tolerated treatment well   Medical Staff Made Aware PT Darcy   Nurse Made Aware OK to see per LUIS ANGEL Anderson   RUE Assessment   RUE Assessment WFL  (mmt 5/5)   LUE Assessment   LUE Assessment WFL  (mmt 5/5)   Hand Function   Gross Motor Coordination Functional   Fine Motor Coordination Functional   Sensation   Light Touch No apparent deficits   Sharp/Dull No apparent deficits   Vision-Basic Assessment   Current Vision Does not wear glasses  (reports had them but lost them)   Patient Visual Report   (no acute visual changes)   Cognition   Overall Cognitive Status WFL   Arousal/Participation Alert; Responsive;Arousable; Cooperative   Attention Within functional limits   Orientation Level Oriented X4   Memory Within functional limits   Following Commands Follows all commands and directions without difficulty   Assessment   Limitation Decreased endurance   Prognosis Fair   Assessment Pt is a 54 y o  male seen for OT evaluation s/p admit to [facility] on 9/22/2019 w/ Cellulitis of left forearm    Comorbidities affecting pt's functional performance at time of assessment include: heroin use disorder, moderate episodes of major depressive disorder, substance abuse, sepsis, pneumonia, acute drug withdrawal syndrome and chronic b/l back pain  Personal factors affecting pt at time of IE include:limited home support, level of education, limited insight into deficits, financial barriers, health management , environment and homeless  Prior to admission, pt reports living on the streets with his one friend who recently left him and is now alone, and was performing ADLs (given environmental setup) and functional mobility without any AD  Upon evaluation: Patient is near functional baseline, so no further skilled acute occupational therapy services are needed at this time  Patient appears to be functioning at community level independence with functional mobility and ADL task performance  Can return to community environment when medically stable  Pt will no longer benefit from immediate acute skilled OT services  Pt w/ no further questions or concerns regarding OT services  Will D/C OT at this time  Please re-consult if pt's medical status changes  Based on findings, patient is of moderate complexity     Goals   Patient Goals to get better   Recommendation   OT Discharge Recommendation Other (Comment)  (return to community envt)   OT - OK to Discharge Yes  (when medically stable)   Barthel Index   Feeding 10   Bathing 5   Grooming Score 5   Dressing Score 10   Bladder Score 10   Bowels Score 10   Toilet Use Score 10   Transfers (Bed/Chair) Score 15   Mobility (Level Surface) Score 15   Stairs Score 5   Barthel Index Score 95         Chidi Figueroa MS, OTR/L

## 2019-09-24 NOTE — PHYSICAL THERAPY NOTE
Physical Therapy Evaluation     Patient's Name: Sam Bender    Admitting Diagnosis  Substance abuse ([facility] ) [F19 10]  Lung infection [J18 9]  Hepatitis C [B19 20]  Fever [R50 9]  Drug use [F19 90]  Cellulitis of left forearm [W17 334]  H[facility] ([facility] ) [F11 10]    Problem List  Patient Active Problem List   Diagnosis    BMI 35 0-35 9,adult    Chronic bilateral back pain    Chronic hepatitis C without hepatic coma ([place] [address] )    Dental disease    Fatigue due to exposure    Fibrosis of liver    Financial problems    Heroin use disorder, severe, dependence ([place] [address] )    Moderate episode of recurrent major depressive disorder ([place] [address] )    Sleep apnea    Substance abuse ([place] [address] )    Cellulitis of left forearm    Sepsis ([place] [address] )    Pneumonia    Acute drug withdrawal syndrome ([place] [address] )       Past Medical History  Past Medical History:   Diagnosis Date    Drug abuse ([place] [address] )     Hepatitis C virus     Sleep apnea        Past Surgical History  Past Surgical History:   Procedure Laterality Date    CYST REMOVAL      ELBOW SURGERY          09/24/19 0946   Note Type   Note type Eval only   Pain Assessment   Pain Assessment FLACC   Pain Location Abdomen   Pain Rating: FLACC (Rest) - Face 0   Pain Rating: FLACC (Rest) - Legs 0   Pain Rating: FLACC (Rest) - Activity 0   Pain Rating: FLACC (Rest) - Cry 0   Pain Rating: FLACC (Rest) - Consolability 0   Score: FLACC (Rest) 0   Pain Rating: FLACC (Activity) - Face 1   Pain Rating: FLACC (Activity) - Legs 0   Pain Rating: FLACC (Activity) - Activity 0   Pain Rating: FLACC (Activity) - Cry 0   Pain Rating: FLACC (Activity) - Consolability 0   Score: FLACC (Activity) 1   Home Living   Type of Home Homeless   Additional Comments PRIOR TO THIS ADMISSION PATIENT REPORTS BEING HOMELESS (WAS PREVIOUSLY STAYING IN A PARKING LOT) AND I WITH MOBILITY (NO USE OF AD) AND SELF CARE      Prior Function   Level of [place] Independent with ADLs and functional mobility   Lives With Alone   Receives Help From Friend(s)   ADL Assistance Independent   IADLs Independent   Falls in the last 6 months 0   Restrictions/Precautions   Weight Bearing Precautions Per Order No   Braces or Orthoses   (NONE)   Other Precautions Pain;Multiple lines   General   Family/Caregiver Present No   Cognition   Overall Cognitive Status WFL   Arousal/Participation Alert   Attention Within functional limits   Orientation Level Oriented X4   Memory Within functional limits   Following Commands Follows all commands and directions without difficulty   RUE Assessment   RUE Assessment WFL   LUE Assessment   LUE Assessment WFL   RLE Assessment   RLE Assessment WFL   LLE Assessment   LLE Assessment WFL   Bed Mobility   Supine to Sit 6  Modified independent   Additional items Increased time required  (LINE MANAGEMENT)   Sit to Supine 6  Modified independent   Additional items Increased time required  (LINE MANAGEMENT)   Transfers   Sit to Stand 6  Modified independent   Additional items Increased time required  (LINE MANAGEMENT)   Stand to Sit 6  Modified independent   Additional items Increased time required  (LINE MANAGEMENT)   Ambulation/Elevation   Gait pattern   (MILDLY REDUCED GAIT SPEED)   Gait Assistance 6  Modified independent   Additional items Verbal cues  (LINE MANAGEMENT)   Assistive Device None; Other (Comment)  (IV POLE )   Distance 80 FEET X 2   Stair Management Assistance Not tested   Balance   Static Sitting Normal   Static Standing Good   Ambulatory Good   Endurance Deficit   Endurance Deficit No   Activity Tolerance   Activity Tolerance Patient tolerated treatment well   Medical Staff Made Aware OT Grace 44    Nurse Made Aware CHANEL TO SEE PER RN MARY    Assessment   Prognosis Good   Problem List Decreased endurance;Pain   Assessment PT COMPLETED EVALUATION OF 54YEAR OLD MALE ADMITTED TO [address] [place] [address] ON 9/22/19 WITH DRUG HISTORY (MOST RECENT USE 9/21/19) REQUESTING HELP, REPORTING "I NEED TO GO TO REHAB"   DIAGNOSES INCLUDE L WRIST ABSCESS (PENDING I&D WITH VASCULAR SURGERY), PNA, AND SEPSIS  CURRENT STATUS INSTABILITIES INCLUDE PENDING PROCEDURE, USE OF IV FLUIDS, FALLS RISK, AND PAIN  PMH IS SIGNIFICANT FOR DRUG ABUSE, HEP C, AND SLEEP APNEA  PRIOR TO THIS ADMISSION PATIENT REPORTS BEING HOMELESS (WAS PREVIOUSLY STAYING IN A PARKING LOT) AND I WITH MOBILITY (NO USE OF AD) AND SELF CARE  CURRENT IMPAIRMENTS INCLUDE PAIN (ABDOMEN), DECREASED ACTIVITY TOLERANCE, FALLS RISK (LINES), AND GAIT DEVIATIONS (REDUCED SPEED)  DURING PT EVALUATION PATIENT PERFORMED SUPINE<-->SIT AND SIT<-->STAND TRANSFERS MOD-I WITH INCREASED TIME  HE AMBULATED 80 FEET MOD-I PUSHING IV POLE + 80 FEET WITHOUT PUSHING IV POLE PRESENTING WITHOUT LOB, REDUCED GAIT SPEED  PATIENT DEMONSTRATES SAFE MOBILITY AT THIS TIME AND DENIES QUESTIONS/CONCERNS  HE DEMONSTRATES SAFE COMMUNITY LEVEL MOBILITY AND MAY D/C TO NEXT APPROPRIATE LEVEL OF CARE OF PREVIOUS ENVT WHEN MED CHANEL  D/C INPT PT      Goals   Patient Goals TO GET BETTER   PT Treatment Day 0   Recommendation   Recommendation Other (Comment)  (TO PREV ENVT OR NEXT CHANEL LEVEL OF MEDICAL CARE (?DRUG REHAB))   Equipment Recommended   (NONE)   PT - OK to Discharge Yes  (TO PREV ENVT OR NEXT CHANEL LEVEL OF MEDICAL CARE (?DRUG REHAB))   Barthel Index   Feeding 10   Bathing 5   Grooming Score 5   Dressing Score 10   Bladder Score 10   Bowels Score 10   Toilet Use Score 10   Transfers (Bed/Chair) Score 15   Mobility (Level Surface) Score 15   Stairs Score 5   Barthel Index Score 95       Mireya Lewis, PT

## 2019-09-24 NOTE — NURSING NOTE
Assessment remains unchanged, pt continues to be diaphoretic, with c/o generalized pain  Linens changed  Moist productive cough noted  Pt refused incentive spirometer  Will continue to monitor, call bell within reach

## 2019-09-24 NOTE — PROCEDURES
With the patient in the supine position, the left wrist was prepped and draped in the usual manner  The fluctuant mass on the dorsal aspect of the left wrist was anesthetized, and drained with a vertical linear incision  White purulent material emerged    The wound was packed with one 2x2  The patient tolerated procedure well  Hemostasis was adequate  This procedure was performed at the bedside

## 2019-09-24 NOTE — PLAN OF CARE
Problem: DISCHARGE PLANNING - CARE MANAGEMENT  Goal: Discharge to post-acute care or home with appropriate resources  Description  INTERVENTIONS:  - Conduct assessment to determine patient/family and health care team treatment goals, and need for post-acute services based on payer coverage, community resources, and patient preferences, and barriers to discharge  - Address psychosocial, clinical, and financial barriers to discharge as identified in assessment in conjunction with the patient/family and health care team  - Arrange appropriate level of post-acute services according to patients   needs and preference and payer coverage in collaboration with the physician and health care team  - Communicate with and update the patient/family, physician, and health care team regarding progress on the discharge plan  - Arrange appropriate transportation to post-acute venues  - CM will make PCP appointment with [facility]  - CM will discuss HOST program with patient  - CM will discuss ARM with patient     Outcome: Progressing

## 2019-09-24 NOTE — PROGRESS NOTES
Progress Note - Tha Camacho 1970, 54 y o  male MRN: [id number]    Unit/Bed#: 5T -01 Encounter: [id number]    Primary Care Provider: Tylor Riggs MD   Date and time admitted to hospital: 9/22/2019  7:00 PM        Sepsis [facility])  Assessment & Plan  Secondary to L forearm cellulitis & possible pneumonia  Patient also actively withdrawing from heroin so clinical picture is not definitive  No signs of end organ dysfunction & lactic acid normal     Sepsis has now resolved      * Cellulitis of left forearm  Assessment & Plan  Patient reports "feeling unwell" with erythema & tenderness of L forearm x 3 days  Patients is active IVDA with multiple injection sites noted  Plan to bib L forearm erythema margins; currently with no compartment tenderness or restrictions in ROM  LUE neurovasc intact with sensation intact all dermatomes & +2 radial pulse  Seems to be clinically improving  Continue Rocephin for now  Await blood cultures  Venous Doppler left upper extremity negative for DVT  Patient noted to have a fluctuating abscess on his left forearm at the site of recent IV injection  consult placed to vascular surgery  May need incision and drainage      Pneumonia  Assessment & Plan  Patient has atypical pneumonia versus aspiration pneumonia  CT Chest reviewed  Procalcitonin level is elevated  Ceftriaxone & Azithromycin for now  Chest Physiotherapy & Pulmonary toilet  O2 NC to keep O2 sat > 92%  Xoponex nebs, as needed    Acute drug withdrawal syndrome [facility])  Assessment & Plan  Patient reports recent heroin & K2 usage, reports last usage was in the am 9/21/2019  Patient currently has realized body aches and pains, diaphoresis, cramps, nausea  Symptomatic medication management of acute withdrawal syndrome ordered, and to be used as needed  Close observation    Patient states he has tried multiple rehabs in the past and states that they do not help him  He feels the only thing that helps him is being regularly compliant with going to [employer]  He also looks forward to going to live with his brother in the [facility] and states that he must be clean of drugs before he can go  He has refused any rehab services at this time    Substance abuse [facility])  Assessment & Plan  Patient abuses heroin and K2  Currently going to active drug withdrawal   Continue supportive symptomatic management--    Moderate episode of recurrent major depressive disorder [facility])  Assessment & Plan  Currently on no prescription medication  He states he does suffer from mild to moderate depression but denies any homicidal or suicidal ideation  Will refer for outpatient follow-up with Psychiatry    Heroin use disorder, severe, dependence ([facility] [address] )  Assessment & Plan  --patient states that he uses 4-5 bags of heroin daily IV  He is not interested in drug rehab at this time      VTE Pharmacologic Prophylaxis:   Pharmacologic: Enoxaparin (Lovenox)  Mechanical VTE Prophylaxis in Place: No    Patient Centered Rounds: I have performed bedside rounds with nursing staff today  Discussions with Specialists or Other Care Team Provider:  Will discuss with surgery    Education and Discussions with Family / Patient:  Discussed with patient at bedside about hospital course    Time Spent for Care: 30 minutes  More than 50% of total time spent on counseling and coordination of care as described above  Current Length of Stay: 2 day(s)    Current Patient Status: Inpatient   Certification Statement: The patient will continue to require additional inpatient hospital stay due to Cellulitis left arm    Discharge Plan:  Discharge home in 24-48 hours    Code Status: Level 1 - Full Code      Subjective:   Patient denies any chest pain or shortness of breath    He is complaining of cold sweats and body aches and also complained of left arm tenderness at the site of fluctuation    Objective:     Vitals:   Temp (24hrs), Av 3 °F (36 3 °C), Min:96 9 °F (36 1 °C), Max:97 8 °F (36 6 °C)    Temp:  [96 9 °F (36 1 °C)-97 8 °F (36 6 °C)] 97 8 °F (36 6 °C)  HR:  [52-57] 55  Resp:  [18-20] 20  BP: (136-172)/(65-95) 172/85  SpO2:  [97 %-99 %] 98 %  Body mass index is 28 23 kg/m²  Input and Output Summary (last 24 hours): Intake/Output Summary (Last 24 hours) at 9/24/2019 1013  Last data filed at 9/24/2019 0816  Gross per 24 hour   Intake 1655 ml   Output    Net 1655 ml       Physical Exam:     Physical Exam   Constitutional: He is oriented to person, place, and time  He appears well-developed and well-nourished  HENT:   Head: Normocephalic and atraumatic  Mouth/Throat: Oropharynx is clear and moist    Eyes: Conjunctivae and EOM are normal    Neck: Normal range of motion  Neck supple  Cardiovascular: Normal rate, regular rhythm and normal heart sounds  Pulmonary/Chest: Effort normal and breath sounds normal    Abdominal: Soft  Bowel sounds are normal  He exhibits no mass  There is no tenderness  There is no rebound and no guarding  Genitourinary:   Genitourinary Comments: deferred   Musculoskeletal:   Left arm redness is resolving and 3 x 3 cm fluctuating area on the distal forearm with tenderness on palpation   Neurological: He is alert and oriented to person, place, and time  He has normal reflexes  Cranial nerves 2-12 are normal   Normal neurological exam   Skin: Skin is warm and dry  No rash noted  Psychiatric: He has a normal mood and affect  Nursing note and vitals reviewed          Additional Data:     Labs:    Results from last 7 days   Lab Units 09/23/19  0957   WBC Thousand/uL 7 20   HEMOGLOBIN g/dL 14 9   HEMATOCRIT % 43 4   PLATELETS Thousands/uL 175   NEUTROS PCT % 67*   LYMPHS PCT % 21*   MONOS PCT % 10   EOS PCT % 1     Results from last 7 days   Lab Units 09/24/19  0532 09/23/19  0957   SODIUM mmol/L 138 138   POTASSIUM mmol/L 4 0 4 2   CHLORIDE mmol/L 108 107   CO2 mmol/L 25 25   BUN mg/dL 9 14   CREATININE mg/dL 0 59* 0 60*   ANION GAP mmol/L 5 6   CALCIUM mg/dL 8 6 8 3*   ALBUMIN g/dL  --  3 5   TOTAL BILIRUBIN mg/dL  --  0 80   ALK PHOS U/L  --  81   ALT U/L  --  16   AST U/L  --  19   GLUCOSE RANDOM mg/dL 104* 141*             Results from last 7 days   Lab Units 09/23/19  0957   HEMOGLOBIN A1C % 5 4     Results from last 7 days   Lab Units 09/23/19  0957 09/22/19  2322 09/22/19  1933   LACTIC ACID mmol/L  --   --  1 6   PROCALCITONIN ng/ml 0 54* 0 66*  --            * I Have Reviewed All Lab Data Listed Above  * Additional Pertinent Lab Tests Reviewed: Shawn 66 Admission Reviewed    Imaging:    Imaging Reports Reviewed Today Include:  None  Imaging Personally Reviewed by Myself Includes:  None    Recent Cultures (last 7 days):     Results from last 7 days   Lab Units 09/23/19  0924 09/22/19  2102   BLOOD CULTURE   --  No Growth at 24 hrs  No Growth at 24 hrs     LEGIONELLA URINARY ANTIGEN  Negative  --        Last 24 Hours Medication List:     Current Facility-Administered Medications:  acetaminophen 650 mg Oral Q6H [address] Gita Aschoff, PA-C    azithromycin 500 mg Oral Q24H Vernell Demarco MD    cefTRIAXone 1,000 mg Intravenous Q24H Gita Aschoff, PA-C Last Rate: 1,000 mg (09/23/19 2118)   cloNIDine 0 1 mg Oral [facility] Nicci Aschoff, PA-C    diazepam 5 mg Intravenous Q6H PRN Nicci Aschoff, PA-DILAN    enoxaparin 40 mg Subcutaneous Daily Gita Aschoff, PA-DILAN    hydrOXYzine HCL 50 mg Oral Q6H PRN Nicci Ascrebekah, PA-DILAN    ibuprofen 600 mg Oral Q6H PRN Nicci Ascrebekah, PA-DILAN    levalbuterol 1 25 mg Nebulization Q6H PRN Nicci Aschoff, PA-DILAN    loperamide 4 mg Oral TID PRN Nicci Ascrebekah, LENO    nicotine 1 patch Transdermal Daily Reddy Bro PA-C    ondansetron 4 mg Intravenous Q6H PRN Nicci Aschoff, PA-C    pneumococcal 13-valent conjugate vaccine 0 5 mL Intramuscular Prior to discharge Elizabeth Murdock MD    sodium chloride 100 mL/hr Intravenous Continuous Lunette Jury, MD Last Rate: 125 mL/hr (09/24/19 0513)        Today, Patient Was Seen By: Frankie Camacho MD    ** Please Note: Dictation voice to text software may have been used in the creation of this document   **

## 2019-09-24 NOTE — CONSULTS
Consultation - Vascular Surgery   John Reveles 54 y o  male MRN: [id number]  Unit/Bed#: 5T -01 Encounter: [id number]      Assessment/Plan      Assessment:  Left wrist abscess  Plan:  Incise and drain left wrist abscess    History of Present Illness   Physician Requesting Consult: Amber Urena MD  Reason for Consult / Principal Problem: Left wrist abscess  History, ROS and PFSH unobtainable from any source due to none  HPI: John Reveles is a 54y o  year old male who presents with with a painful mass on his left wrist   Present for one week  Consults    Review of Systems   All other systems reviewed and are negative  Historical Information   Past Medical History:   Diagnosis Date    Drug abuse ([facility] [address] )     Hepatitis C virus     Sleep apnea      Past Surgical History:   Procedure Laterality Date    CYST REMOVAL      ELBOW SURGERY       Social History   Social History     Substance and Sexual Activity   Alcohol Use Not Currently     Social History     Substance and Sexual Activity   Drug Use Yes    Types: Heroin    Comment: k2     Social History     Tobacco Use   Smoking Status Current Every Day Smoker    Packs/day: 0 50    Types: Cigarettes   Smokeless Tobacco Never Used     Family History: non-contributory}    Meds/Allergies   all current active meds have been reviewed  No Known Allergies    Objective   Vitals: Blood pressure (!) 172/85, pulse 55, temperature 97 8 °F (36 6 °C), temperature source Temporal, resp  rate 20, height 5' 2" (1 575 m), weight 70 kg (154 lb 5 2 oz), SpO2 98 %  ,Body mass index is 28 23 kg/m²  Intake/Output Summary (Last 24 hours) at 9/24/2019 1111  Last data filed at 9/24/2019 0816  Gross per 24 hour   Intake 1655 ml   Output    Net 1655 ml     Invasive Devices     Peripheral Intravenous Line            Peripheral IV 09/22/19 Left Antecubital 1 day                Physical Exam   Constitutional: He is oriented to person, place, and time   He appears well-developed and well-nourished  Neck: No JVD present  Cardiovascular: Normal heart sounds  Pulmonary/Chest: Breath sounds normal    Abdominal: Soft  Musculoskeletal:   3 cm tender fluctuant mass, dorsal aspect of left wrist      Neurological: He is alert and oriented to person, place, and time  Pulses:  Axillary   Brachial    Radial   Ulnar    Right          +            +             +          +  Left            +             +             +          +  Lab Results: I have personally reviewed pertinent reports  Imaging Studies: I have personally reviewed pertinent reports  EKG, Pathology, and Other Studies: I have personally reviewed pertinent reports  VTE Prophylaxis: Enoxaparin (Lovenox)     Code Status: Level 1 - Full Code  Advance Directive and Living Will:      Power of :    POLST:      Counseling / Coordination of Care  Counseling/Coordination of Care: Total floor / unit time spent today 20 minutes  Greater than 50% of total time was spent with the patient and / or family counseling and / or coordination of care   A description of the counseling / coordination of care: incise and drain now

## 2019-09-24 NOTE — ASSESSMENT & PLAN NOTE
Patient reports "feeling unwell" with erythema & tenderness of L forearm x 3 days  Patients is active IVDA with multiple injection sites noted  Plan to bib L forearm erythema margins; currently with no compartment tenderness or restrictions in ROM  LUE neurovasc intact with sensation intact all dermatomes & +2 radial pulse  Seems to be clinically improving  Continue Rocephin for now  Await blood cultures  Venous Doppler left upper extremity negative for DVT  Patient noted to have a fluctuating abscess on his left forearm at the site of recent IV injection  consult placed to vascular surgery    May need incision and drainage

## 2019-09-24 NOTE — NURSING NOTE
Received pt this afternoon  Pt is AAO x4 and pt has complaints of abdominal pain at this time  Pt has no edema and has palpable pulses in all extremities  Pt lung sounds are diminished and heart tones are regular  Pt is diaphoretic at this time, linens changed  Bed is low and locked, call bell is in reach, will continue to monitor   Nevelyn Holstein, RN

## 2019-09-24 NOTE — SOCIAL WORK
LOS 2 GMLOS none  Patient is not bundled or a 30 day readmission    Patient admitted for LFA/ wrist cellulitis/ abscess secondary to IV drug use    Patient had I+D to abscess by Dr Andrés Barksdale today and continues IV ABX  CM in to see patient for assessment and consultation  Patient states he is homeless, currently livingon the street  CM talked to patient about ARM and patient states he has been there before and does not want to go there  Patient states he is not , does have 2 children that he does have any contact with  Patient denies having a living will and denies any current legal issues  PCP listed is Dr Ebony Chavez, patient states he does not know who this PCP is, CM called [facility] to set up new patient appointment and received voice mail, requested for someone to RTC with new patient appointment for patient  [facility] [address] [facility]  Patient does not receive any income, he states he has applied for SSI/SSD on several occasions and he is always declined  Patient is a IV drug user, drug of choice is heroin  CM spoke to patient about HOST program and he declines drug rehabilitation at this time  Patient has a hx of major depressive disorder he does not see anyone for this diagnosis  Patient does not drive he walks  CM awaiting RTC from [employer] for PCP appointment

## 2019-09-24 NOTE — NURSING NOTE
Received pt this afternoon  Pt is AAO x4 and pt has no complaints of pain at this time  Pt has no edema and has palpable pulses in all extremities  Pt lung sounds are clear and heart tones are regular  Pt very diaphoretic and multiple linen changes done to keep pt dry  Bed is low and locked, call bell is in reach, will continue to monitor   Pascual Macario RN

## 2019-09-24 NOTE — ASSESSMENT & PLAN NOTE
Secondary to L forearm cellulitis & possible pneumonia  Patient also actively withdrawing from heroin so clinical picture is not definitive    No signs of end organ dysfunction & lactic acid normal     Sepsis has now resolved

## 2019-09-24 NOTE — PLAN OF CARE
Problem: Nutrition/Hydration-ADULT  Goal: Nutrient/Hydration intake appropriate for improving, restoring or maintaining nutritional needs  Description  Monitor and assess patient's nutrition/hydration status for malnutrition  Collaborate with interdisciplinary team and initiate plan and interventions as ordered  Monitor patient's weight and dietary intake as ordered or per policy  Utilize nutrition screening tool and intervene as necessary  Determine patient's food preferences and provide high-protein, high-caloric foods as appropriate       INTERVENTIONS:  - Monitor oral intake, urinary output, labs, and treatment plans  - Assess nutrition and hydration status and recommend course of action  - Evaluate amount of meals eaten  - Assist patient with eating if necessary   - Allow adequate time for meals  - Recommend/ encourage appropriate diets, oral nutritional supplements, and vitamin/mineral supplements  - Order, calculate, and assess calorie counts as needed  - Recommend, monitor, and adjust tube feedings and TPN/PPN based on assessed needs  - Assess need for intravenous fluids  - Provide specific nutrition/hydration education as appropriate  - Include patient/family/caregiver in decisions related to nutrition  Outcome: Progressing     Problem: PAIN - ADULT  Goal: Verbalizes/displays adequate comfort level or baseline comfort level  Description  Interventions:  - Encourage patient to monitor pain and request assistance  - Assess pain using appropriate pain scale  - Administer analgesics based on type and severity of pain and evaluate response  - Implement non-pharmacological measures as appropriate and evaluate response  - Consider cultural and social influences on pain and pain management  - Notify physician/advanced practitioner if interventions unsuccessful or patient reports new pain  Outcome: Progressing     Problem: INFECTION - ADULT  Goal: Absence or prevention of progression during hospitalization  Description  INTERVENTIONS:  - Assess and monitor for signs and symptoms of infection  - Monitor lab/diagnostic results  - Monitor all insertion sites, i e  indwelling lines, tubes, and drains  - Monitor endotracheal if appropriate and nasal secretions for changes in amount and color  - [place] appropriate cooling/warming therapies per order  - Administer medications as ordered  - Instruct and encourage patient and family to use good hand hygiene technique  - Identify and instruct in appropriate isolation precautions for identified infection/condition  Outcome: Progressing  Goal: Absence of fever/infection during neutropenic period  Description  INTERVENTIONS:  - Monitor WBC    Outcome: Progressing     Problem: SAFETY ADULT  Goal: Patient will remain free of falls  Description  INTERVENTIONS:  - Assess patient frequently for physical needs  -  Identify cognitive and physical deficits and behaviors that affect risk of falls    -  [place] fall precautions as indicated by assessment   - Educate patient/family on patient safety including physical limitations  - Instruct patient to call for assistance with activity based on assessment  - Modify environment to reduce risk of injury  - Consider OT/PT consult to assist with strengthening/mobility  Outcome: Progressing  Goal: Maintain or return to baseline ADL function  Description  INTERVENTIONS:  -  Assess patient's ability to carry out ADLs; assess patient's baseline for ADL function and identify physical deficits which impact ability to perform ADLs (bathing, care of mouth/teeth, toileting, grooming, dressing, etc )  - Assess/evaluate cause of self-care deficits   - Assess range of motion  - Assess patient's mobility; develop plan if impaired  - Assess patient's need for assistive devices and provide as appropriate  - Encourage maximum independence but intervene and supervise when necessary  - Involve family in performance of ADLs  - Assess for home care needs following discharge   - Consider OT consult to assist with ADL evaluation and planning for discharge  - Provide patient education as appropriate  Outcome: Progressing  Goal: Maintain or return mobility status to optimal level  Description  INTERVENTIONS:  - Assess patient's baseline mobility status (ambulation, transfers, stairs, etc )    - Identify cognitive and physical deficits and behaviors that affect mobility  - Identify mobility aids required to assist with transfers and/or ambulation (gait belt, sit-to-stand, lift, walker, cane, etc )  - [place] fall precautions as indicated by assessment  - Record patient progress and toleration of activity level on Mobility SBAR; progress patient to next Phase/Stage  - Instruct patient to call for assistance with activity based on assessment  - Consider rehabilitation consult to assist with strengthening/weightbearing, etc   Outcome: Progressing     Problem: DISCHARGE PLANNING  Goal: Discharge to home or other facility with appropriate resources  Description  INTERVENTIONS:  - Identify barriers to discharge w/patient and caregiver  - Arrange for needed discharge resources and transportation as appropriate  - Identify discharge learning needs (meds, wound care, etc )  - Arrange for interpretive services to assist at discharge as needed  - Refer to Case Management Department for coordinating discharge planning if the patient needs post-hospital services based on physician/advanced practitioner order or complex needs related to functional status, cognitive ability, or social support system  Outcome: Progressing     Problem: Knowledge Deficit  Goal: Patient/family/caregiver demonstrates understanding of disease process, treatment plan, medications, and discharge instructions  Description  Complete learning assessment and assess knowledge base    Interventions:  - Provide teaching at level of understanding  - Provide teaching via preferred learning methods  Outcome: Progressing     Problem: Prexisting or High Potential for Compromised Skin Integrity  Goal: Skin integrity is maintained or improved  Description  INTERVENTIONS:  - Identify patients at risk for skin breakdown  - Assess and monitor skin integrity  - Assess and monitor nutrition and hydration status  - Monitor labs   - Assess for incontinence   - Turn and reposition patient  - Assist with mobility/ambulation  - Relieve pressure over bony prominences  - Avoid friction and shearing  - Provide appropriate hygiene as needed including keeping skin clean and dry  - Evaluate need for skin moisturizer/barrier cream  - Collaborate with interdisciplinary team   - Patient/family teaching  - Consider wound care consult   Outcome: Progressing     Problem: Potential for Falls  Goal: Patient will remain free of falls  Description  INTERVENTIONS:  - Assess patient frequently for physical needs  -  Identify cognitive and physical deficits and behaviors that affect risk of falls    -  [place] fall precautions as indicated by assessment   - Educate patient/family on patient safety including physical limitations  - Instruct patient to call for assistance with activity based on assessment  - Modify environment to reduce risk of injury  - Consider OT/PT consult to assist with strengthening/mobility  Outcome: Progressing

## 2019-09-24 NOTE — ASSESSMENT & PLAN NOTE
Patient reports recent heroin & K2 usage, reports last usage was in the am 9/21/2019  Patient currently has realized body aches and pains, diaphoresis, cramps, nausea  Symptomatic medication management of acute withdrawal syndrome ordered, and to be used as needed  Close observation  Patient states he has tried multiple rehabs in the past and states that they do not help him  He feels the only thing that helps him is being regularly compliant with going to [employer]  He also looks forward to going to live with his brother in the [facility] and states that he must be clean of drugs before he can go    He has refused any rehab services at this time

## 2019-09-25 VITALS
SYSTOLIC BLOOD PRESSURE: 141 MMHG | BODY MASS INDEX: 28.4 KG/M2 | HEART RATE: 52 BPM | TEMPERATURE: 99.2 F | RESPIRATION RATE: 20 BRPM | WEIGHT: 154.32 LBS | DIASTOLIC BLOOD PRESSURE: 90 MMHG | OXYGEN SATURATION: 98 % | HEIGHT: 62 IN

## 2019-09-25 PROCEDURE — 99239 HOSP IP/OBS DSCHRG MGMT >30: CPT | Performed by: FAMILY MEDICINE

## 2019-09-25 PROCEDURE — 90670 PCV13 VACCINE IM: CPT | Performed by: INTERNAL MEDICINE

## 2019-09-25 RX ORDER — AMOXICILLIN AND CLAVULANATE POTASSIUM 875; 125 MG/1; MG/1
1 TABLET, FILM COATED ORAL EVERY 12 HOURS SCHEDULED
Qty: 10 TABLET | Refills: 0 | Status: SHIPPED | OUTPATIENT
Start: 2019-09-25 | End: 2019-09-30

## 2019-09-25 RX ORDER — ALBUTEROL SULFATE 90 UG/1
2 AEROSOL, METERED RESPIRATORY (INHALATION) EVERY 6 HOURS PRN
Qty: 18 G | Refills: 0 | Status: SHIPPED | OUTPATIENT
Start: 2019-09-25

## 2019-09-25 RX ORDER — ACETAMINOPHEN 325 MG/1
650 TABLET ORAL EVERY 6 HOURS SCHEDULED
Qty: 30 TABLET | Refills: 0 | Status: SHIPPED | OUTPATIENT
Start: 2019-09-25 | End: 2020-04-24 | Stop reason: HOSPADM

## 2019-09-25 RX ORDER — CLONAZEPAM 0.5 MG/1
0.5 TABLET, ORALLY DISINTEGRATING ORAL DAILY
Qty: 5 TABLET | Refills: 0 | Status: SHIPPED | OUTPATIENT
Start: 2019-09-25 | End: 2020-04-24 | Stop reason: HOSPADM

## 2019-09-25 RX ORDER — HYDROXYZINE 50 MG/1
50 TABLET, FILM COATED ORAL EVERY 6 HOURS PRN
Qty: 30 TABLET | Refills: 0 | Status: SHIPPED | OUTPATIENT
Start: 2019-09-25 | End: 2020-04-24 | Stop reason: HOSPADM

## 2019-09-25 RX ADMIN — SODIUM CHLORIDE 100 ML/HR: 0.9 INJECTION, SOLUTION INTRAVENOUS at 06:24

## 2019-09-25 RX ADMIN — ENOXAPARIN SODIUM 40 MG: 40 INJECTION SUBCUTANEOUS at 08:05

## 2019-09-25 RX ADMIN — ACETAMINOPHEN 650 MG: 325 TABLET ORAL at 11:58

## 2019-09-25 RX ADMIN — ACETAMINOPHEN 650 MG: 325 TABLET ORAL at 06:24

## 2019-09-25 RX ADMIN — PNEUMOCOCCAL 13-VALENT CONJUGATE VACCINE 0.5 ML: 2.2; 2.2; 2.2; 2.2; 2.2; 4.4; 2.2; 2.2; 2.2; 2.2; 2.2; 2.2; 2.2 INJECTION, SUSPENSION INTRAMUSCULAR at 11:59

## 2019-09-25 RX ADMIN — CLONIDINE HYDROCHLORIDE 0.1 MG: 0.1 TABLET ORAL at 06:28

## 2019-09-25 RX ADMIN — KETOROLAC TROMETHAMINE 15 MG: 30 INJECTION, SOLUTION INTRAMUSCULAR; INTRAVENOUS at 07:51

## 2019-09-25 RX ADMIN — AZITHROMYCIN 500 MG: 250 TABLET, FILM COATED ORAL at 11:58

## 2019-09-25 NOTE — NURSING NOTE
Received pt in PM  On assessment AAOx4, reports 9/10 generalized pain and pain located in left extremity  Dressings were saturated with moderate amount of blood  Dressing changed and patient tolerated well  PRN pain medication administered  Pt diaphoretic and clammy, full linen change provided  Will continue to monitor, call bell within reach

## 2019-09-25 NOTE — SOCIAL WORK
CM made aware by attending that patient is medically cleared for D/C  Patient is MA pending, homeless, and unemployed, prescriptions were electronically sent to  Tasha Wright, [facility] Radha Jones completed financial Assistance Eligible form and faxed to pharmacy  CM spoke with staff at [facility], [facility] and they stated, patient needs to continue care with established PCP   CM called [facility]  and made after hospital appointment and placed on AVS

## 2019-09-25 NOTE — PROGRESS NOTES
Packing changed at bedside today  Less left wrist swelling  Base of wound he is developing granulation tissue  May discharge from surgical standpoint  I will see patient in my office later this week

## 2019-09-25 NOTE — ASSESSMENT & PLAN NOTE
Patient has atypical pneumonia versus aspiration pneumonia  CT Chest reviewed  Procalcitonin level is elevated  Ceftriaxone & Azithromycin for now    Chest Physiotherapy & Pulmonary toilet  O2 NC to keep O2 sat > 92%  Xoponex nebs, as needed  Will discharge on a course of Augmentin for 5 more days

## 2019-09-25 NOTE — PLAN OF CARE
Problem: Nutrition/Hydration-ADULT  Goal: Nutrient/Hydration intake appropriate for improving, restoring or maintaining nutritional needs  Description  Monitor and assess patient's nutrition/hydration status for malnutrition  Collaborate with interdisciplinary team and initiate plan and interventions as ordered  Monitor patient's weight and dietary intake as ordered or per policy  Utilize nutrition screening tool and intervene as necessary  Determine patient's food preferences and provide high-protein, high-caloric foods as appropriate       INTERVENTIONS:  - Monitor oral intake, urinary output, labs, and treatment plans  - Assess nutrition and hydration status and recommend course of action  - Evaluate amount of meals eaten  - Assist patient with eating if necessary   - Allow adequate time for meals  - Recommend/ encourage appropriate diets, oral nutritional supplements, and vitamin/mineral supplements  - Order, calculate, and assess calorie counts as needed  - Recommend, monitor, and adjust tube feedings and TPN/PPN based on assessed needs  - Assess need for intravenous fluids  - Provide specific nutrition/hydration education as appropriate  - Include patient/family/caregiver in decisions related to nutrition  Outcome: Progressing     Problem: PAIN - ADULT  Goal: Verbalizes/displays adequate comfort level or baseline comfort level  Description  Interventions:  - Encourage patient to monitor pain and request assistance  - Assess pain using appropriate pain scale  - Administer analgesics based on type and severity of pain and evaluate response  - Implement non-pharmacological measures as appropriate and evaluate response  - Consider cultural and social influences on pain and pain management  - Notify physician/advanced practitioner if interventions unsuccessful or patient reports new pain  Outcome: Progressing     Problem: INFECTION - ADULT  Goal: Absence or prevention of progression during hospitalization  Description  INTERVENTIONS:  - Assess and monitor for signs and symptoms of infection  - Monitor lab/diagnostic results  - Monitor all insertion sites, i e  indwelling lines, tubes, and drains  - Monitor endotracheal if appropriate and nasal secretions for changes in amount and color  - [place] appropriate cooling/warming therapies per order  - Administer medications as ordered  - Instruct and encourage patient and family to use good hand hygiene technique  - Identify and instruct in appropriate isolation precautions for identified infection/condition  Outcome: Progressing  Goal: Absence of fever/infection during neutropenic period  Description  INTERVENTIONS:  - Monitor WBC    Outcome: Progressing     Problem: SAFETY ADULT  Goal: Patient will remain free of falls  Description  INTERVENTIONS:  - Assess patient frequently for physical needs  -  Identify cognitive and physical deficits and behaviors that affect risk of falls    -  [place] fall precautions as indicated by assessment   - Educate patient/family on patient safety including physical limitations  - Instruct patient to call for assistance with activity based on assessment  - Modify environment to reduce risk of injury  - Consider OT/PT consult to assist with strengthening/mobility  Outcome: Progressing  Goal: Maintain or return to baseline ADL function  Description  INTERVENTIONS:  -  Assess patient's ability to carry out ADLs; assess patient's baseline for ADL function and identify physical deficits which impact ability to perform ADLs (bathing, care of mouth/teeth, toileting, grooming, dressing, etc )  - Assess/evaluate cause of self-care deficits   - Assess range of motion  - Assess patient's mobility; develop plan if impaired  - Assess patient's need for assistive devices and provide as appropriate  - Encourage maximum independence but intervene and supervise when necessary  - Involve family in performance of ADLs  - Assess for home care needs following discharge   - Consider OT consult to assist with ADL evaluation and planning for discharge  - Provide patient education as appropriate  Outcome: Progressing  Goal: Maintain or return mobility status to optimal level  Description  INTERVENTIONS:  - Assess patient's baseline mobility status (ambulation, transfers, stairs, etc )    - Identify cognitive and physical deficits and behaviors that affect mobility  - Identify mobility aids required to assist with transfers and/or ambulation (gait belt, sit-to-stand, lift, walker, cane, etc )  - [place] fall precautions as indicated by assessment  - Record patient progress and toleration of activity level on Mobility SBAR; progress patient to next Phase/Stage  - Instruct patient to call for assistance with activity based on assessment  - Consider rehabilitation consult to assist with strengthening/weightbearing, etc   Outcome: Progressing     Problem: DISCHARGE PLANNING  Goal: Discharge to home or other facility with appropriate resources  Description  INTERVENTIONS:  - Identify barriers to discharge w/patient and caregiver  - Arrange for needed discharge resources and transportation as appropriate  - Identify discharge learning needs (meds, wound care, etc )  - Arrange for interpretive services to assist at discharge as needed  - Refer to Case Management Department for coordinating discharge planning if the patient needs post-hospital services based on physician/advanced practitioner order or complex needs related to functional status, cognitive ability, or social support system  Outcome: Progressing     Problem: Knowledge Deficit  Goal: Patient/family/caregiver demonstrates understanding of disease process, treatment plan, medications, and discharge instructions  Description  Complete learning assessment and assess knowledge base    Interventions:  - Provide teaching at level of understanding  - Provide teaching via preferred learning methods  Outcome: Progressing     Problem: Prexisting or High Potential for Compromised Skin Integrity  Goal: Skin integrity is maintained or improved  Description  INTERVENTIONS:  - Identify patients at risk for skin breakdown  - Assess and monitor skin integrity  - Assess and monitor nutrition and hydration status  - Monitor labs   - Assess for incontinence   - Turn and reposition patient  - Assist with mobility/ambulation  - Relieve pressure over bony prominences  - Avoid friction and shearing  - Provide appropriate hygiene as needed including keeping skin clean and dry  - Evaluate need for skin moisturizer/barrier cream  - Collaborate with interdisciplinary team   - Patient/family teaching  - Consider wound care consult   Outcome: Progressing     Problem: Potential for Falls  Goal: Patient will remain free of falls  Description  INTERVENTIONS:  - Assess patient frequently for physical needs  -  Identify cognitive and physical deficits and behaviors that affect risk of falls    -  [place] fall precautions as indicated by assessment   - Educate patient/family on patient safety including physical limitations  - Instruct patient to call for assistance with activity based on assessment  - Modify environment to reduce risk of injury  - Consider OT/PT consult to assist with strengthening/mobility  Outcome: Progressing     Problem: DISCHARGE PLANNING - CARE MANAGEMENT  Goal: Discharge to post-acute care or home with appropriate resources  Description  INTERVENTIONS:  - Conduct assessment to determine patient/family and health care team treatment goals, and need for post-acute services based on payer coverage, community resources, and patient preferences, and barriers to discharge  - Address psychosocial, clinical, and financial barriers to discharge as identified in assessment in conjunction with the patient/family and health care team  - Arrange appropriate level of post-acute services according to patients   needs and preference and payer coverage in collaboration with the physician and health care team  - Communicate with and update the patient/family, physician, and health care team regarding progress on the discharge plan  - Arrange appropriate transportation to post-acute venues  Outcome: Progressing

## 2019-09-25 NOTE — ASSESSMENT & PLAN NOTE
Patient reports "feeling unwell" with erythema & tenderness of L forearm x 3 days  Patients is active IVDA with multiple injection sites noted  Plan to bib L forearm erythema margins; currently with no compartment tenderness or restrictions in ROM  LUE neurovasc intact with sensation intact all dermatomes & +2 radial pulse  clinically improving  Continue Rocephin for now until discharge  Blood cultures are negative for 24 hours  Venous Doppler left upper extremity negative for DVT  Patient noted to have a fluctuating abscess on his left forearm at the site of recent IV injection  consult placed to vascular surgery  Abscess was drained yesterday by surgery at bedside    Continue dressings  Will discharge home on a course of Bactrim and Augmentin

## 2019-09-25 NOTE — PLAN OF CARE
Problem: Nutrition/Hydration-ADULT  Goal: Nutrient/Hydration intake appropriate for improving, restoring or maintaining nutritional needs  Description  Monitor and assess patient's nutrition/hydration status for malnutrition  Collaborate with interdisciplinary team and initiate plan and interventions as ordered  Monitor patient's weight and dietary intake as ordered or per policy  Utilize nutrition screening tool and intervene as necessary  Determine patient's food preferences and provide high-protein, high-caloric foods as appropriate       INTERVENTIONS:  - Monitor oral intake, urinary output, labs, and treatment plans  - Assess nutrition and hydration status and recommend course of action  - Evaluate amount of meals eaten  - Assist patient with eating if necessary   - Allow adequate time for meals  - Recommend/ encourage appropriate diets, oral nutritional supplements, and vitamin/mineral supplements  - Order, calculate, and assess calorie counts as needed  - Recommend, monitor, and adjust tube feedings and TPN/PPN based on assessed needs  - Assess need for intravenous fluids  - Provide specific nutrition/hydration education as appropriate  - Include patient/family/caregiver in decisions related to nutrition  Outcome: Progressing     Problem: PAIN - ADULT  Goal: Verbalizes/displays adequate comfort level or baseline comfort level  Description  Interventions:  - Encourage patient to monitor pain and request assistance  - Assess pain using appropriate pain scale  - Administer analgesics based on type and severity of pain and evaluate response  - Implement non-pharmacological measures as appropriate and evaluate response  - Consider cultural and social influences on pain and pain management  - Notify physician/advanced practitioner if interventions unsuccessful or patient reports new pain  Outcome: Progressing     Problem: INFECTION - ADULT  Goal: Absence or prevention of progression during hospitalization  Description  INTERVENTIONS:  - Assess and monitor for signs and symptoms of infection  - Monitor lab/diagnostic results  - Monitor all insertion sites, i e  indwelling lines, tubes, and drains  - Monitor endotracheal if appropriate and nasal secretions for changes in amount and color  - [place] appropriate cooling/warming therapies per order  - Administer medications as ordered  - Instruct and encourage patient and family to use good hand hygiene technique  - Identify and instruct in appropriate isolation precautions for identified infection/condition  Outcome: Progressing  Goal: Absence of fever/infection during neutropenic period  Description  INTERVENTIONS:  - Monitor WBC    Outcome: Progressing     Problem: SAFETY ADULT  Goal: Patient will remain free of falls  Description  INTERVENTIONS:  - Assess patient frequently for physical needs  -  Identify cognitive and physical deficits and behaviors that affect risk of falls    -  [place] fall precautions as indicated by assessment   - Educate patient/family on patient safety including physical limitations  - Instruct patient to call for assistance with activity based on assessment  - Modify environment to reduce risk of injury  - Consider OT/PT consult to assist with strengthening/mobility  Outcome: Progressing  Goal: Maintain or return to baseline ADL function  Description  INTERVENTIONS:  -  Assess patient's ability to carry out ADLs; assess patient's baseline for ADL function and identify physical deficits which impact ability to perform ADLs (bathing, care of mouth/teeth, toileting, grooming, dressing, etc )  - Assess/evaluate cause of self-care deficits   - Assess range of motion  - Assess patient's mobility; develop plan if impaired  - Assess patient's need for assistive devices and provide as appropriate  - Encourage maximum independence but intervene and supervise when necessary  - Involve family in performance of ADLs  - Assess for home care needs following discharge   - Consider OT consult to assist with ADL evaluation and planning for discharge  - Provide patient education as appropriate  Outcome: Progressing  Goal: Maintain or return mobility status to optimal level  Description  INTERVENTIONS:  - Assess patient's baseline mobility status (ambulation, transfers, stairs, etc )    - Identify cognitive and physical deficits and behaviors that affect mobility  - Identify mobility aids required to assist with transfers and/or ambulation (gait belt, sit-to-stand, lift, walker, cane, etc )  - [place] fall precautions as indicated by assessment  - Record patient progress and toleration of activity level on Mobility SBAR; progress patient to next Phase/Stage  - Instruct patient to call for assistance with activity based on assessment  - Consider rehabilitation consult to assist with strengthening/weightbearing, etc   Outcome: Progressing     Problem: DISCHARGE PLANNING  Goal: Discharge to home or other facility with appropriate resources  Description  INTERVENTIONS:  - Identify barriers to discharge w/patient and caregiver  - Arrange for needed discharge resources and transportation as appropriate  - Identify discharge learning needs (meds, wound care, etc )  - Arrange for interpretive services to assist at discharge as needed  - Refer to Case Management Department for coordinating discharge planning if the patient needs post-hospital services based on physician/advanced practitioner order or complex needs related to functional status, cognitive ability, or social support system  Outcome: Progressing     Problem: Knowledge Deficit  Goal: Patient/family/caregiver demonstrates understanding of disease process, treatment plan, medications, and discharge instructions  Description  Complete learning assessment and assess knowledge base    Interventions:  - Provide teaching at level of understanding  - Provide teaching via preferred learning methods  Outcome: Progressing     Problem: Prexisting or High Potential for Compromised Skin Integrity  Goal: Skin integrity is maintained or improved  Description  INTERVENTIONS:  - Identify patients at risk for skin breakdown  - Assess and monitor skin integrity  - Assess and monitor nutrition and hydration status  - Monitor labs   - Assess for incontinence   - Turn and reposition patient  - Assist with mobility/ambulation  - Relieve pressure over bony prominences  - Avoid friction and shearing  - Provide appropriate hygiene as needed including keeping skin clean and dry  - Evaluate need for skin moisturizer/barrier cream  - Collaborate with interdisciplinary team   - Patient/family teaching  - Consider wound care consult   Outcome: Progressing     Problem: Potential for Falls  Goal: Patient will remain free of falls  Description  INTERVENTIONS:  - Assess patient frequently for physical needs  -  Identify cognitive and physical deficits and behaviors that affect risk of falls    -  [place] fall precautions as indicated by assessment   - Educate patient/family on patient safety including physical limitations  - Instruct patient to call for assistance with activity based on assessment  - Modify environment to reduce risk of injury  - Consider OT/PT consult to assist with strengthening/mobility  Outcome: Progressing     Problem: DISCHARGE PLANNING - CARE MANAGEMENT  Goal: Discharge to post-acute care or home with appropriate resources  Description  INTERVENTIONS:  - Conduct assessment to determine patient/family and health care team treatment goals, and need for post-acute services based on payer coverage, community resources, and patient preferences, and barriers to discharge  - Address psychosocial, clinical, and financial barriers to discharge as identified in assessment in conjunction with the patient/family and health care team  - Arrange appropriate level of post-acute services according to patients   needs and preference and payer coverage in collaboration with the physician and health care team  - Communicate with and update the patient/family, physician, and health care team regarding progress on the discharge plan  - Arrange appropriate transportation to post-acute venues  Outcome: Progressing

## 2019-09-25 NOTE — NURSING NOTE
Pt's dressing was done by the surgeon, pt tolerated, dressing supplies were given to pt along with prescriptions and medications  Pt verbalized understanding the D/C info and meds  Pt said he was going to call cousin and stay with him

## 2019-09-25 NOTE — PLAN OF CARE
Problem: Nutrition/Hydration-ADULT  Goal: Nutrient/Hydration intake appropriate for improving, restoring or maintaining nutritional needs  Description  Monitor and assess patient's nutrition/hydration status for malnutrition  Collaborate with interdisciplinary team and initiate plan and interventions as ordered  Monitor patient's weight and dietary intake as ordered or per policy  Utilize nutrition screening tool and intervene as necessary  Determine patient's food preferences and provide high-protein, high-caloric foods as appropriate       INTERVENTIONS:  - Monitor oral intake, urinary output, labs, and treatment plans  - Assess nutrition and hydration status and recommend course of action  - Evaluate amount of meals eaten  - Assist patient with eating if necessary   - Allow adequate time for meals  - Recommend/ encourage appropriate diets, oral nutritional supplements, and vitamin/mineral supplements  - Order, calculate, and assess calorie counts as needed  - Recommend, monitor, and adjust tube feedings and TPN/PPN based on assessed needs  - Assess need for intravenous fluids  - Provide specific nutrition/hydration education as appropriate  - Include patient/family/caregiver in decisions related to nutrition  9/25/2019 1429 by Carlos Manuel Mcgraw RN  Outcome: Adequate for Discharge  9/25/2019 1031 by Carlos Manuel Mcgraw RN  Outcome: Progressing     Problem: PAIN - ADULT  Goal: Verbalizes/displays adequate comfort level or baseline comfort level  Description  Interventions:  - Encourage patient to monitor pain and request assistance  - Assess pain using appropriate pain scale  - Administer analgesics based on type and severity of pain and evaluate response  - Implement non-pharmacological measures as appropriate and evaluate response  - Consider cultural and social influences on pain and pain management  - Notify physician/advanced practitioner if interventions unsuccessful or patient reports new pain  9/25/2019 1429 by Derrick Campos RN  Outcome: Adequate for Discharge  9/25/2019 1031 by Derrick Campos RN  Outcome: Progressing     Problem: INFECTION - ADULT  Goal: Absence or prevention of progression during hospitalization  Description  INTERVENTIONS:  - Assess and monitor for signs and symptoms of infection  - Monitor lab/diagnostic results  - Monitor all insertion sites, i e  indwelling lines, tubes, and drains  - Monitor endotracheal if appropriate and nasal secretions for changes in amount and color  - [place] appropriate cooling/warming therapies per order  - Administer medications as ordered  - Instruct and encourage patient and family to use good hand hygiene technique  - Identify and instruct in appropriate isolation precautions for identified infection/condition  9/25/2019 1429 by Derrick Campos RN  Outcome: Adequate for Discharge  9/25/2019 1031 by Derrick Campos RN  Outcome: Progressing  Goal: Absence of fever/infection during neutropenic period  Description  INTERVENTIONS:  - Monitor WBC    9/25/2019 1429 by Derrick Campos RN  Outcome: Adequate for Discharge  9/25/2019 1031 by Derrick Campos RN  Outcome: Progressing     Problem: SAFETY ADULT  Goal: Patient will remain free of falls  Description  INTERVENTIONS:  - Assess patient frequently for physical needs  -  Identify cognitive and physical deficits and behaviors that affect risk of falls    -  [place] fall precautions as indicated by assessment   - Educate patient/family on patient safety including physical limitations  - Instruct patient to call for assistance with activity based on assessment  - Modify environment to reduce risk of injury  - Consider OT/PT consult to assist with strengthening/mobility  9/25/2019 1429 by Derrick Campos RN  Outcome: Adequate for Discharge  9/25/2019 1031 by Derrick Campos RN  Outcome: Progressing  Goal: Maintain or return to baseline ADL function  Description  INTERVENTIONS:  -  Assess patient's ability to carry out ADLs; assess patient's baseline for ADL function and identify physical deficits which impact ability to perform ADLs (bathing, care of mouth/teeth, toileting, grooming, dressing, etc )  - Assess/evaluate cause of self-care deficits   - Assess range of motion  - Assess patient's mobility; develop plan if impaired  - Assess patient's need for assistive devices and provide as appropriate  - Encourage maximum independence but intervene and supervise when necessary  - Involve family in performance of ADLs  - Assess for home care needs following discharge   - Consider OT consult to assist with ADL evaluation and planning for discharge  - Provide patient education as appropriate  9/25/2019 1429 by Kait Jones RN  Outcome: Adequate for Discharge  9/25/2019 1031 by Kait Jones RN  Outcome: Progressing  Goal: Maintain or return mobility status to optimal level  Description  INTERVENTIONS:  - Assess patient's baseline mobility status (ambulation, transfers, stairs, etc )    - Identify cognitive and physical deficits and behaviors that affect mobility  - Identify mobility aids required to assist with transfers and/or ambulation (gait belt, sit-to-stand, lift, walker, cane, etc )  - [place] fall precautions as indicated by assessment  - Record patient progress and toleration of activity level on Mobility SBAR; progress patient to next Phase/Stage  - Instruct patient to call for assistance with activity based on assessment  - Consider rehabilitation consult to assist with strengthening/weightbearing, etc   9/25/2019 1429 by Kait Jones RN  Outcome: Adequate for Discharge  9/25/2019 1031 by Kait Jones RN  Outcome: Progressing     Problem: DISCHARGE PLANNING  Goal: Discharge to home or other facility with appropriate resources  Description  INTERVENTIONS:  - Identify barriers to discharge w/patient and caregiver  - Arrange for needed discharge resources and transportation as appropriate  - Identify discharge learning needs (meds, wound care, etc )  - Arrange for interpretive services to assist at discharge as needed  - Refer to Case Management Department for coordinating discharge planning if the patient needs post-hospital services based on physician/advanced practitioner order or complex needs related to functional status, cognitive ability, or social support system  9/25/2019 1429 by Ivis Olmos RN  Outcome: Adequate for Discharge  9/25/2019 1031 by Ivis Olmos RN  Outcome: Progressing     Problem: Knowledge Deficit  Goal: Patient/family/caregiver demonstrates understanding of disease process, treatment plan, medications, and discharge instructions  Description  Complete learning assessment and assess knowledge base    Interventions:  - Provide teaching at level of understanding  - Provide teaching via preferred learning methods  9/25/2019 1429 by Ivis Olmos RN  Outcome: Adequate for Discharge  9/25/2019 1031 by Ivis Olmos RN  Outcome: Progressing     Problem: Prexisting or High Potential for Compromised Skin Integrity  Goal: Skin integrity is maintained or improved  Description  INTERVENTIONS:  - Identify patients at risk for skin breakdown  - Assess and monitor skin integrity  - Assess and monitor nutrition and hydration status  - Monitor labs   - Assess for incontinence   - Turn and reposition patient  - Assist with mobility/ambulation  - Relieve pressure over bony prominences  - Avoid friction and shearing  - Provide appropriate hygiene as needed including keeping skin clean and dry  - Evaluate need for skin moisturizer/barrier cream  - Collaborate with interdisciplinary team   - Patient/family teaching  - Consider wound care consult   9/25/2019 1429 by Ivis Olmos RN  Outcome: Adequate for Discharge  9/25/2019 1031 by Ivis Olmos RN  Outcome: Progressing     Problem: Potential for Falls  Goal: Patient will remain free of falls  Description  INTERVENTIONS:  - Assess patient frequently for physical needs  -  Identify cognitive and physical deficits and behaviors that affect risk of falls    -  [place] fall precautions as indicated by assessment   - Educate patient/family on patient safety including physical limitations  - Instruct patient to call for assistance with activity based on assessment  - Modify environment to reduce risk of injury  - Consider OT/PT consult to assist with strengthening/mobility  9/25/2019 1429 by Janis Meadows RN  Outcome: Adequate for Discharge  9/25/2019 1031 by Janis Meadows RN  Outcome: Progressing     Problem: DISCHARGE PLANNING - CARE MANAGEMENT  Goal: Discharge to post-acute care or home with appropriate resources  Description  INTERVENTIONS:  - Conduct assessment to determine patient/family and health care team treatment goals, and need for post-acute services based on payer coverage, community resources, and patient preferences, and barriers to discharge  - Address psychosocial, clinical, and financial barriers to discharge as identified in assessment in conjunction with the patient/family and health care team  - Arrange appropriate level of post-acute services according to patients   needs and preference and payer coverage in collaboration with the physician and health care team  - Communicate with and update the patient/family, physician, and health care team regarding progress on the discharge plan  - Arrange appropriate transportation to post-acute venues  9/25/2019 1429 by Janis Meadows RN  Outcome: Adequate for Discharge  9/25/2019 1031 by Janis Meadows RN  Outcome: Progressing

## 2019-09-26 LAB
BACTERIA SPT RESP CULT: ABNORMAL
GRAM STN SPEC: ABNORMAL

## 2019-09-28 LAB
BACTERIA BLD CULT: NORMAL
BACTERIA BLD CULT: NORMAL

## 2019-10-29 NOTE — UTILIZATION REVIEW
URGENT/EMERGENT  INPATIENT/SPU AUTHORIZATION REQUEST    Date: 10/29/19            # Pages in this Request:     x New Request   Additional Information for PA#:     Office Contact Name:  Suzie Reveles Title: Utilization Review, Regmelly Nurse     Phone: [phone]  Ext  Availability (Date/Time): Wednesday - Friday 8 am- 4 pm    x Inpatient Review  SPU Review        Current       x Late Pick-up   · How your facility was first notified of the Late Pick-up:Paths Letter   · When your facility was first notified of the Late Pick-up (date): 10/25/2019         RECIPIENT INFORMATION    Recipient ID#: [id number]   Recipient Name: Gianfranco Stallings     YOB: 1970  54 y o  Recipient Alias:     Gender:  x Male  Female Medicaid Eligibility ([address] [place] [address]): INSURANCE INFORMATION    (All other private or governmental health insurance benefits must be utilized prior to billing the [place] Program)    Was this admission the result of an MVA, other accident, assault, injury, fall, gunshot, bite etc ? Yes x No                   If yes, provide a brief description of the incident  Does the recipient have other insurance coverage? Yes x No        Insurance Company Name/Policy #      Did that insurance pay on this claim? Yes  No        Did that insurance deny this claim? Yes  No    If yes, reason for denial:      Does the recipient have Medicare? Yes x No        Did Medicare exhaust prior to this admission? Yes  No        Did Medicare partially pay this claim? Yes  No        Did that insurance deny this claim? Yes  No    If yes, reason for denial:          Was the recipient a prisoner at the time of admission?   Yes x No            PROVIDER INFORMATION    Hospital Name: [facility] Provider ID#: [id number]    [address] [place] [address] Physician Name: Cheikh Rios Provider ID#: [id number]        ADMISSION INFORMATION    Type of Admission: (please choose one)    x ED      Direct If yes, from where? Transfer    If yes, transferring hospital (inpatient, rehab, or psych) Provider Name/Provider ID#: Admission Floor or Unit Type: Med Surg  telem    Dates/Times:        ED Date/Time: 9/22/2019  7:00 PM        Observation Date/Time:         Admission Date/Time: 9/22/19 2120        Discharge or Transfer Date/Time: 9/25/2019  1:30 PM        DIAGNOSIS/PROCEDURE CODES    Primary Diagnosis Code/Primary Diagnosis Code description:  A41 9 Sepsis, unspecified organism   J69 0 Pneumonitis due to inhalation of food and vomit   L03 114 Cellulitis of left upper limb   F14 23 Cocaine dependence with withdrawal   Additional Diagnosis Code(s) and Description(s)-(up to three additional codes):    Procedure Code (one) and description:  [id number] Drainage of Left Lower Arm Skin, External Approach       CLINICAL INFORMATION - PRIOR ADMISSION ONLY    Is there a prior admission with a discharge date within 30 days of the date of this admission?    x No (Proceed to the next section - "Clinical Information - General Review Checklist:)      Yes (Provide the following information)     Prior admission dates:    MA Prior Authorization Number:        Review Outcome:     Diagnosis Code(s)/Description:    Procedure Code/Description:    Findings:    Treatment:    Condition on Discharge:   Vitals:    Labs:   Imaging:   Medications: Follow-up Instructions:    Disposition:        CLINICAL INFORMATION - GENERAL REVIEW CHECKLIST    EMERGENCY DEPARTMENT: (Proceed to "ADMISSION" if Direct Admission)    Presenting Signs/Symptoms:50 y/o male presents to ED from home with c/o generalized weakness, fatigue, nausea, abd discomfort, SOB  Admits to IV heroin and K2 abuse, and is asking for help with his addiction  Currently homeless  On exam, dry mucous membranes, tachycardia, wheezes, rales, LFA erythema and swelling, fluctuance, streaking, concern for abscess    Admitted as inpatient due to sepsis secondary to L forearm cellulitis, pneumonia, acute drug withdrawal   Continue IVF, IV antibiotics  Blood cx pending  Q2h neurovascular checks  UE venous duplex  Psych consult  9/23 Cellulitis improving on IV antibiotics  Atypical pneumonia vs aspiration pneumonia  Chest PT and pulm toilet  Continued body aches, diaphoresis, abd cramping, nausea  Continue symptomatic management        Medication/treatment prior to arrival in the ED:    Past Medical History:    Active Ambulatory Problems     Diagnosis Date Noted    BMI 35 0-35 9,adult 06/05/2017    Chronic bilateral back pain 06/05/2017    Chronic hepatitis C without hepatic coma ([facility] [address] ) 06/05/2017    Dental disease 06/05/2017    Fatigue due to exposure 06/05/2017    Fibrosis of liver 09/13/2017    Financial problems 06/05/2017    Sleep apnea 06/05/2017     Past Medical History:   Diagnosis Date    Drug abuse ([facility] )     Hepatitis C virus        Clinical Exam:    Initial Vital Signs: (Temp, Pulse, Resp, and BP)   ED Triage Vitals [09/22/19 1904]   Temperature Pulse Respirations Blood Pressure SpO2   100 °F (37 8 °C) (!) 110 22 107/62 93 %      Temp Source Heart Rate Source Patient Position - Orthostatic VS BP Location FiO2 (%)   Tympanic Monitor Sitting Left arm --      Pain Score       9           Pertinent Repeat Vital Signs: (include times they were obtained)  09/23/19 1454    144/94     09/23/19 0743 97 °F (36 1 °C)Abnormal  72 20 132/98 97 % None (Room air)   09/23/19 0712  47Abnormal   142/97     09/22/19 2351 99 °F (37 2 °C) 95 18 108/59 94 % None (Room air)   09/22/19 2300 97 °F (36 1 °C)Abnormal  97 16 119/63 93 % None (Room air)   09/22/19 2207 96 6 °F (35 9 °C)Abnormal  85 18 112/64 94 % None (Room air)   09/22/19 2100  83 24Abnormal  109/63 91 % None (Room air)   09/22/19 2045  98 24Abnormal  109/75 95 % None (Room air)   09/22/19 2036 101 °F (38 3 °C)Abnormal             Pertinent Sustained Findings: (include times they were obtained)    Weight in Kilograms:  Wt Readings from Last 1 Encounters:   09/24/19 70 kg (154 lb 5 2 oz)       Pertinent Labs (results):  Results from last 7 days   Lab Units 09/23/19  0957 09/22/19  1933   WBC Thousand/uL 7 20 12 70*   HEMOGLOBIN g/dL 14 9 16 2   HEMATOCRIT % 43 4 47 2   PLATELETS Thousands/uL 175 204   NEUTROS ABS Thousands/µL 4 90 9 90*            Results from last 7 days   Lab Units 09/23/19  0957 09/22/19  1933   SODIUM mmol/L 138 135*   POTASSIUM mmol/L 4 2 4 4   CHLORIDE mmol/L 107 98   CO2 mmol/L 25 29   ANION GAP mmol/L 6 8   BUN mg/dL 14 16   CREATININE mg/dL 0 60* 0 95   EGFR ml/min/1 73sq m 120 95   CALCIUM mg/dL 8 3* 9 0   MAGNESIUM mg/dL 2 4* 1 9   PHOSPHORUS mg/dL 2 4*  --             Results from last 7 days   Lab Units 09/23/19  0957 09/22/19  1933   AST U/L 19 23   ALT U/L 16 16   ALK PHOS U/L 81 75   TOTAL PROTEIN g/dL 7 6 9 0*   ALBUMIN g/dL 3 5 4 1   TOTAL BILIRUBIN mg/dL 0 80 2 10*            Results from last 7 days   Lab Units 09/23/19  0957 09/22/19  1933   GLUCOSE RANDOM mg/dL 141* 121*           Results from last 7 days   Lab Units 09/22/19  1933   CK TOTAL U/L 63              Results from last 7 days   Lab Units 09/23/19  0957 09/23/19  0154 09/22/19  2322 09/22/19  1933   TROPONIN I ng/mL <0 01 <0 01 <0 01 <0 01           Results from last 7 days   Lab Units 09/22/19  1933   TSH 3RD GENERATON uIU/mL 0 671           Results from last 7 days   Lab Units 09/22/19  2322   PROCALCITONIN ng/ml 0 66*           Results from last 7 days   Lab Units 09/22/19  1933   LACTIC ACID mmol/L 1 6           Results from last 7 days   Lab Units 09/22/19  1933   NT-PRO BNP pg/mL 137      Results from last 7 days   Lab Units 09/22/19  2322   HEP B S AG   Non-reactive   HEP C AB   High Reactive*   HEP B C IGM   Non-reactive           Results from last 7 days   Lab Units 09/22/19 1933   LIPASE u/L 25           Results from last 7 days   Lab Units 09/22/19 1932   CLARITY UA   Clear   COLOR UA   Brown*   SPEC GRAV UA   1 010   PH UA   6 0   GLUCOSE UA mg/dl Negative   KETONES UA mg/dl 15 (1+)*   BLOOD UA   Negative   PROTEIN UA mg/dl 15 (Trace)*   NITRITE UA   Negative   BILIRUBIN UA   1 mg/dL*   UROBILINOGEN UA mg/dL 12 0*   LEUKOCYTES UA   Negative   WBC UA /hpf 0-1*   RBC UA /hpf None Seen   BACTERIA UA /hpf Occasional   EPITHELIAL CELLS WET PREP /hpf Occasional            Results from last 7 days   Lab Units 09/23/19  0924 09/22/19 1932   STREP PNEUMONIAE ANTIGEN, URINE    --  Negative   LEGIONELLA URINARY ANTIGEN   Negative  --            Results from last 7 days   Lab Units 09/22/19 1932   AMPH/METH   Negative   BARBITURATE UR   Negative   BENZODIAZEPINE UR   Negative   COCAINE UR   Negative   METHADONE URINE   Negative   OPIATE UR   Positive*   PCP UR   Negative   THC UR   Positive*           Results from last 7 days   Lab Units 09/22/19 1933   ETHANOL LVL mg/dL <10        Radiology (results):  9/22 CXR:  No acute cardiopulmonary disease      9/22 Xray LFA:  No acute osseous abnormality      9/22 CT chest:  Very mild peripheral mild tree-in-bud opacities compatible which may be seen with atypical infection    Scattered very mild tree-in-bud opacities are best seen within the peripheral anterior right middle lobe and right upper lobe      EKG (results):   9/22 EKG:  Normal sinus rhythm  9/23 EKG:  Sinus bradycardia with sinus arrhythmia  Other tests (results):    Tests pending final results:    Treatment in the ED:   Medication Administration from 09/22/2019 1854 to 09/22/2019 2204       Date/Time Order Dose Route Action     09/22/2019 1945 sodium chloride 0 9 % bolus 1,000 mL 1,000 mL Intravenous New Bag     09/22/2019 1943 ipratropium-albuterol (DUO-NEB) 0 5-2 5 mg/3 mL inhalation solution 3 mL 3 mL Nebulization Given     09/22/2019 2112 sodium chloride 0 9 % bolus 1,000 mL 1,000 mL Intravenous New Bag     09/22/2019 1958 acetaminophen (TYLENOL) tablet 650 mg 650 mg Oral Given     09/22/2019 2040 ibuprofen (MOTRIN) tablet 400 mg 400 mg Oral Given     09/22/2019 2126 cefTRIAXone (ROCEPHIN) IVPB (premix) 1,000 mg 1,000 mg Intravenous New Bag           Other treatments:      Change in condition while in the ED:     Response to ED Treatment:          OBSERVATION: (Proceed to "ADMISSION" if Direct Admission)    Orders written during the observation period  Meds Name, dose, route, time, how may doses given:  PRN Meds Name, dose, route, time, how many doses given within the first 24 hrs :  IVs Type, rate, and total amt  ordered/given:  Labs, imaging, other:  Consults and findings:    Test Results during the observation period  Pertinent Lab tests (dates/results):  Culture results (blood, urine, spinal, wound, respiratory, etc ):  Imaging tests (dates/results):  EKG (dates/results):   Other test (dates/results):  Tests pending (dates/results):    Surgical or Invasive Procedures during the observation period  Name of surgery/procedure:  Date & Time:  Patient Response:  Post-operative orders:  Operative Report/Findings:    Response to Treatment, Major Change in Condition, Major Charge in Treatment during the observation period          ADMISSION:    DIRECT Admissions Only:    · Presenting Signs/Symptoms:   ·   · Medication/treatment prior to arrival:  ·   · Past Medical History:  ·   · Clinical Exam on admission:  ·   · Vital Signs on admission: (Temp, Pulse, Resp, and BP)  ·   · Weight in kilograms:     ALL Admissions:    Admission Orders and Other Orders written within the first 24 hrs after admission  NC O2  Wound care  SCDs  VS  Elevate HOB  Aspiration precautions  Sputum cx  PT/OT         Meds Name, dose, route, time, how may doses given:  acetaminophen 650 mg Oral Q6H [address]   azithromycin 500 mg Intravenous Q24H   cefTRIAXone 1,000 mg Intravenous Q24H   cloNIDine 0 1 mg Oral Q8H [address]   diazepam 5 mg Intravenous Q6H PRN   enoxaparin 40 mg Subcutaneous Daily   hydrOXYzine HCL 50 mg Oral Q6H PRN   ibuprofen 600 mg Oral Q6H PRN levalbuterol 1 25 mg Nebulization Q6H PRN   loperamide 4 mg Oral TID PRN   nicotine 1 patch Transdermal Daily   ondansetron 4 mg Intravenous Q6H PRN   pneumococcal 13-valent conjugate vaccine 0 5 mL Intramuscular Prior to discharge   sodium chloride 125 mL/hr Intravenous Continuous        PRN Meds Name, dose, route, time, how many doses given within the first 24 hrs :  IVs Type, rate, and total   VAS  Upper limb venous duplex - 9/23 - Impression  RIGHT UPPER LIMB LIMITED:  Evaluation shows no evidence of thrombus in the internal jugular vein,  subclavian vein, and the brachiocephalic vein  LEFT UPPER LIMB:  No evidence of acute or chronic deep vein thrombosis  No evidence of superficial thrombophlebitis noted  Doppler evaluation shows a normal response to augmentation maneuvers  Consults and findings: Surgery consult - 9/24/2019 - Assessment:  Left wrist abscess  Plan:  Incise and drain left wrist abscess    Test Results after admission  Pertinent Lab tests (dates/results):  Culture results (blood, urine, spinal, wound, respiratory, etc ):  Imaging tests (dates/results):  EKG (dates/results): Other test (dates/results):  Tests pending (dates/results):    Surgical or Invasive Procedures  Name of surgery/procedure: I & D  Left wrist - fluctuant mass on the dorsal aspect of the left wrist  Date & Time:9/24/2019 - 11:07 am   Patient Response: tolerated   Post-operative orders: Same   Operative Report/Findings: Whie purulent material emerged    Response to Treatment, Major Change in Condition, Major Charge in Treatment anytime during admission  Jenny Witt is a 54 y o  male patient who originally presented to the hospital on 9/22/2019 due to acute drug overdose with IV heroin and K2    Patient was going through active drug withdrawal also noted to have a cellulitis of his left arm with an abscess which was drained in the hospital   He was also found to have a pneumonia which is possibly an aspiration pneumonia  treated with IV antibiotics and is clinically improving  Offered inpatient drug and alcohol rehab however he refused and would like to go live with his brother in the / [place] [address]  He is being discharged home in a stable condition advised to be abstinent from heroin and K2 use  Please see above list of diagnoses and related plan for additional information       Disposition on Discharge  Home, Rehab, SNF, LTC, Shelter, etc : Home/Self Care    Cease to Breathe (CTB)  If a patient expires during an admission, in addition to the above information, please include:    Summary/timeline of the patient's decline in condition:    Medications and treatment:    Patient response to treatment:    Date and time patient ceased to breathe:        Is there a Readmission that follows this admission? Yes x No    If yes, provide dates:          InterQual Review    InterQual Criteria Met: x Yes  No  N/A        Please include the InterQual Review, InterQual year/version used, and the criteria selected:   Created Using Review Status Review Entered   Gen[employer]® In Primary 9/23/2019 15:21       Criteria Set Name - Subset   LOC:Acute Adult-Infection: Pneumonia       Criteria Review   REVIEW SUMMARY     Patient: CARRIE MARLEY II  Review Number: [id number]  Review Status:  In Primary  Criteria Status: Acute Met  Day Met: Episode Day 1     Condition Specific: Yes        OUTCOMES  Outcome Type: Primary           REVIEW DETAILS     Product: Dilip Penta Adult  Subset: Infection: Pneumonia      (Symptom or finding within 24h)         (Excludes PO medications unless noted)          [X] Select Day, One:              [X] Episode Day 1, One:                  [X] ACUTE, All:                      [X] Pneumonia confirmed by imaging                      [X] Finding, >= One:                          [X] >= 2 lobes                      [X] Intervention, All:                          [X] Anti-infective (includes PO)                          [X] Oxygenation, One:                              [X] O2 sat >= 92%(0 92) or baseline                          [X] Oximetry or blood gas                          [X] DVT prophylaxis or patient ambulatory     Version: InterQual® 2018  2  InterQual® and InterQual®  © 2018 [employer] and/or one of its [place]  All Rights Reserved  CPT only © 2017 American [employer] Association  All Rights Reserved  PLEASE SUBMIT THE COMPLETED FORM TO THE DEPARTMENT OF HUMAN SERVICES - DIVISION OF CLINICAL  REVIEW VIA FAX -[phone] or VIA E-MAIL TO [email or web address]    Signature: Lata Brewer Date:  10/29/19    Confidentiality Notice: The documents accompanying this telecopy may contain confidential information belonging to the sender  The information is intended only for the use of the individual named above  If you are not the intended recipient, you are hereby notified  That any disclosure, copying, distribution or taking of any telecopy is strictly prohibited

## 2020-04-21 ENCOUNTER — HOSPITAL ENCOUNTER (INPATIENT)
Facility: HOSPITAL | Age: 50
LOS: 3 days | Discharge: HOME | DRG: 751 | End: 2020-04-24
Attending: PSYCHIATRY & NEUROLOGY | Admitting: PSYCHIATRY & NEUROLOGY
Payer: COMMERCIAL

## 2020-04-21 ENCOUNTER — HOSPITAL ENCOUNTER (EMERGENCY)
Facility: HOSPITAL | Age: 50
End: 2020-04-21
Attending: EMERGENCY MEDICINE | Admitting: EMERGENCY MEDICINE
Payer: COMMERCIAL

## 2020-04-21 VITALS
TEMPERATURE: 97.6 F | HEART RATE: 63 BPM | OXYGEN SATURATION: 96 % | SYSTOLIC BLOOD PRESSURE: 134 MMHG | RESPIRATION RATE: 16 BRPM | DIASTOLIC BLOOD PRESSURE: 71 MMHG | BODY MASS INDEX: 27.7 KG/M2 | WEIGHT: 151.46 LBS

## 2020-04-21 DIAGNOSIS — F19.10 POLYSUBSTANCE ABUSE (HCC): ICD-10-CM

## 2020-04-21 DIAGNOSIS — R45.851 SUICIDAL IDEATION: ICD-10-CM

## 2020-04-21 DIAGNOSIS — F41.9 ANXIETY: ICD-10-CM

## 2020-04-21 DIAGNOSIS — R45.851 SUICIDAL IDEATION: Primary | ICD-10-CM

## 2020-04-21 DIAGNOSIS — G47.00 INSOMNIA: Primary | ICD-10-CM

## 2020-04-21 DIAGNOSIS — F33.2 SEVERE EPISODE OF RECURRENT MAJOR DEPRESSIVE DISORDER, WITHOUT PSYCHOTIC FEATURES (HCC): ICD-10-CM

## 2020-04-21 LAB
AMPHETAMINES SERPL QL SCN: NEGATIVE
BARBITURATES UR QL: NEGATIVE
BENZODIAZ UR QL: NEGATIVE
COCAINE UR QL: POSITIVE
ETHANOL EXG-MCNC: 0 MG/DL
METHADONE UR QL: NEGATIVE
OPIATES UR QL SCN: POSITIVE
PCP UR QL: POSITIVE
SARS-COV-2 RNA RESP QL NAA+PROBE: NEGATIVE
THC UR QL: NEGATIVE

## 2020-04-21 PROCEDURE — 99285 EMERGENCY DEPT VISIT HI MDM: CPT | Performed by: EMERGENCY MEDICINE

## 2020-04-21 PROCEDURE — 99285 EMERGENCY DEPT VISIT HI MDM: CPT

## 2020-04-21 PROCEDURE — 80307 DRUG TEST PRSMV CHEM ANLYZR: CPT | Performed by: EMERGENCY MEDICINE

## 2020-04-21 PROCEDURE — 82075 ASSAY OF BREATH ETHANOL: CPT | Performed by: EMERGENCY MEDICINE

## 2020-04-21 PROCEDURE — 87635 SARS-COV-2 COVID-19 AMP PRB: CPT | Performed by: EMERGENCY MEDICINE

## 2020-04-21 RX ORDER — TRAZODONE HYDROCHLORIDE 50 MG/1
50 TABLET ORAL
Status: DISCONTINUED | OUTPATIENT
Start: 2020-04-21 | End: 2020-04-23

## 2020-04-21 RX ORDER — RISPERIDONE 1 MG/1
1 TABLET, ORALLY DISINTEGRATING ORAL
Status: DISCONTINUED | OUTPATIENT
Start: 2020-04-21 | End: 2020-04-24 | Stop reason: HOSPADM

## 2020-04-21 RX ORDER — BENZTROPINE MESYLATE 1 MG/1
1 TABLET ORAL EVERY 6 HOURS PRN
Status: CANCELLED | OUTPATIENT
Start: 2020-04-21

## 2020-04-21 RX ORDER — HALOPERIDOL 5 MG
5 TABLET ORAL EVERY 6 HOURS PRN
Status: CANCELLED | OUTPATIENT
Start: 2020-04-21

## 2020-04-21 RX ORDER — IBUPROFEN 600 MG/1
600 TABLET ORAL EVERY 6 HOURS PRN
Status: DISCONTINUED | OUTPATIENT
Start: 2020-04-21 | End: 2020-04-24 | Stop reason: HOSPADM

## 2020-04-21 RX ORDER — LORAZEPAM 2 MG/ML
2 INJECTION INTRAMUSCULAR EVERY 6 HOURS PRN
Status: DISCONTINUED | OUTPATIENT
Start: 2020-04-21 | End: 2020-04-24 | Stop reason: HOSPADM

## 2020-04-21 RX ORDER — OLANZAPINE 10 MG/1
10 TABLET ORAL EVERY 8 HOURS PRN
Status: CANCELLED | OUTPATIENT
Start: 2020-04-21

## 2020-04-21 RX ORDER — OLANZAPINE 10 MG/1
10 INJECTION, POWDER, LYOPHILIZED, FOR SOLUTION INTRAMUSCULAR EVERY 8 HOURS PRN
Status: DISCONTINUED | OUTPATIENT
Start: 2020-04-21 | End: 2020-04-24 | Stop reason: HOSPADM

## 2020-04-21 RX ORDER — MAGNESIUM HYDROXIDE/ALUMINUM HYDROXICE/SIMETHICONE 120; 1200; 1200 MG/30ML; MG/30ML; MG/30ML
30 SUSPENSION ORAL EVERY 4 HOURS PRN
Status: CANCELLED | OUTPATIENT
Start: 2020-04-21

## 2020-04-21 RX ORDER — BENZTROPINE MESYLATE 1 MG/ML
1 INJECTION INTRAMUSCULAR; INTRAVENOUS EVERY 6 HOURS PRN
Status: CANCELLED | OUTPATIENT
Start: 2020-04-21

## 2020-04-21 RX ORDER — LORAZEPAM 1 MG/1
1 TABLET ORAL EVERY 6 HOURS PRN
Status: CANCELLED | OUTPATIENT
Start: 2020-04-21

## 2020-04-21 RX ORDER — RISPERIDONE 1 MG/1
1 TABLET, ORALLY DISINTEGRATING ORAL
Status: CANCELLED | OUTPATIENT
Start: 2020-04-21

## 2020-04-21 RX ORDER — ACETAMINOPHEN 325 MG/1
325 TABLET ORAL EVERY 6 HOURS PRN
Status: DISCONTINUED | OUTPATIENT
Start: 2020-04-21 | End: 2020-04-24 | Stop reason: HOSPADM

## 2020-04-21 RX ORDER — HYDROXYZINE HYDROCHLORIDE 25 MG/1
25 TABLET, FILM COATED ORAL EVERY 6 HOURS PRN
Status: CANCELLED | OUTPATIENT
Start: 2020-04-21

## 2020-04-21 RX ORDER — HYDROXYZINE HYDROCHLORIDE 25 MG/1
50 TABLET, FILM COATED ORAL EVERY 6 HOURS PRN
Status: CANCELLED | OUTPATIENT
Start: 2020-04-21

## 2020-04-21 RX ORDER — HYDROXYZINE HYDROCHLORIDE 25 MG/1
25 TABLET, FILM COATED ORAL EVERY 6 HOURS PRN
Status: DISCONTINUED | OUTPATIENT
Start: 2020-04-21 | End: 2020-04-24 | Stop reason: HOSPADM

## 2020-04-21 RX ORDER — HALOPERIDOL 5 MG/ML
5 INJECTION INTRAMUSCULAR EVERY 6 HOURS PRN
Status: DISCONTINUED | OUTPATIENT
Start: 2020-04-21 | End: 2020-04-24 | Stop reason: HOSPADM

## 2020-04-21 RX ORDER — OLANZAPINE 10 MG/1
10 TABLET ORAL EVERY 8 HOURS PRN
Status: DISCONTINUED | OUTPATIENT
Start: 2020-04-21 | End: 2020-04-24 | Stop reason: HOSPADM

## 2020-04-21 RX ORDER — HYDROXYZINE 50 MG/1
50 TABLET, FILM COATED ORAL EVERY 6 HOURS PRN
Status: DISCONTINUED | OUTPATIENT
Start: 2020-04-21 | End: 2020-04-24 | Stop reason: HOSPADM

## 2020-04-21 RX ORDER — BENZTROPINE MESYLATE 1 MG/1
1 TABLET ORAL EVERY 6 HOURS PRN
Status: DISCONTINUED | OUTPATIENT
Start: 2020-04-21 | End: 2020-04-24 | Stop reason: HOSPADM

## 2020-04-21 RX ORDER — OLANZAPINE 10 MG/1
10 INJECTION, POWDER, LYOPHILIZED, FOR SOLUTION INTRAMUSCULAR EVERY 8 HOURS PRN
Status: CANCELLED | OUTPATIENT
Start: 2020-04-21

## 2020-04-21 RX ORDER — ACETAMINOPHEN 325 MG/1
325 TABLET ORAL EVERY 6 HOURS PRN
Status: CANCELLED | OUTPATIENT
Start: 2020-04-21

## 2020-04-21 RX ORDER — MAGNESIUM HYDROXIDE/ALUMINUM HYDROXICE/SIMETHICONE 120; 1200; 1200 MG/30ML; MG/30ML; MG/30ML
30 SUSPENSION ORAL EVERY 4 HOURS PRN
Status: DISCONTINUED | OUTPATIENT
Start: 2020-04-21 | End: 2020-04-24 | Stop reason: HOSPADM

## 2020-04-21 RX ORDER — LORAZEPAM 2 MG/ML
2 INJECTION INTRAMUSCULAR EVERY 6 HOURS PRN
Status: CANCELLED | OUTPATIENT
Start: 2020-04-21

## 2020-04-21 RX ORDER — ACETAMINOPHEN 325 MG/1
650 TABLET ORAL EVERY 4 HOURS PRN
Status: DISCONTINUED | OUTPATIENT
Start: 2020-04-21 | End: 2020-04-24 | Stop reason: HOSPADM

## 2020-04-21 RX ORDER — HALOPERIDOL 5 MG
5 TABLET ORAL EVERY 6 HOURS PRN
Status: DISCONTINUED | OUTPATIENT
Start: 2020-04-21 | End: 2020-04-24 | Stop reason: HOSPADM

## 2020-04-21 RX ORDER — IBUPROFEN 600 MG/1
600 TABLET ORAL EVERY 6 HOURS PRN
Status: CANCELLED | OUTPATIENT
Start: 2020-04-21

## 2020-04-21 RX ORDER — BENZTROPINE MESYLATE 1 MG/ML
1 INJECTION INTRAMUSCULAR; INTRAVENOUS EVERY 6 HOURS PRN
Status: DISCONTINUED | OUTPATIENT
Start: 2020-04-21 | End: 2020-04-24 | Stop reason: HOSPADM

## 2020-04-21 RX ORDER — ACETAMINOPHEN 325 MG/1
650 TABLET ORAL EVERY 4 HOURS PRN
Status: CANCELLED | OUTPATIENT
Start: 2020-04-21

## 2020-04-21 RX ORDER — LORAZEPAM 1 MG/1
1 TABLET ORAL EVERY 6 HOURS PRN
Status: DISCONTINUED | OUTPATIENT
Start: 2020-04-21 | End: 2020-04-24 | Stop reason: HOSPADM

## 2020-04-21 RX ORDER — HALOPERIDOL 5 MG/ML
5 INJECTION INTRAMUSCULAR EVERY 6 HOURS PRN
Status: CANCELLED | OUTPATIENT
Start: 2020-04-21

## 2020-04-21 RX ORDER — TRAZODONE HYDROCHLORIDE 50 MG/1
50 TABLET ORAL
Status: CANCELLED | OUTPATIENT
Start: 2020-04-21

## 2020-04-22 PROBLEM — F33.2 SEVERE EPISODE OF RECURRENT MAJOR DEPRESSIVE DISORDER, WITHOUT PSYCHOTIC FEATURES (HCC): Status: ACTIVE | Noted: 2017-09-05

## 2020-04-22 PROBLEM — R45.851 SUICIDAL IDEATION: Status: ACTIVE | Noted: 2020-04-22

## 2020-04-22 LAB
ALBUMIN SERPL BCP-MCNC: 3.3 G/DL (ref 3.5–5)
ALP SERPL-CCNC: 87 U/L (ref 46–116)
ALT SERPL W P-5'-P-CCNC: 41 U/L (ref 12–78)
ANION GAP SERPL CALCULATED.3IONS-SCNC: 5 MMOL/L (ref 4–13)
AST SERPL W P-5'-P-CCNC: 29 U/L (ref 5–45)
BASOPHILS # BLD AUTO: 0.07 THOUSANDS/ΜL (ref 0–0.1)
BASOPHILS NFR BLD AUTO: 1 % (ref 0–1)
BILIRUB SERPL-MCNC: 0.66 MG/DL (ref 0.2–1)
BUN SERPL-MCNC: 14 MG/DL (ref 5–25)
CALCIUM SERPL-MCNC: 9.2 MG/DL (ref 8.3–10.1)
CHLORIDE SERPL-SCNC: 106 MMOL/L (ref 100–108)
CHOLEST SERPL-MCNC: 149 MG/DL (ref 50–200)
CO2 SERPL-SCNC: 27 MMOL/L (ref 21–32)
CREAT SERPL-MCNC: 0.83 MG/DL (ref 0.6–1.3)
EOSINOPHIL # BLD AUTO: 0.15 THOUSAND/ΜL (ref 0–0.61)
EOSINOPHIL NFR BLD AUTO: 2 % (ref 0–6)
ERYTHROCYTE [DISTWIDTH] IN BLOOD BY AUTOMATED COUNT: 13.1 % (ref 11.6–15.1)
GFR SERPL CREATININE-BSD FRML MDRD: 105 ML/MIN/1.73SQ M
GLUCOSE SERPL-MCNC: 117 MG/DL (ref 65–140)
HCT VFR BLD AUTO: 53.5 % (ref 36.5–49.3)
HDLC SERPL-MCNC: 70 MG/DL
HGB BLD-MCNC: 17.4 G/DL (ref 12–17)
IMM GRANULOCYTES # BLD AUTO: 0.02 THOUSAND/UL (ref 0–0.2)
IMM GRANULOCYTES NFR BLD AUTO: 0 % (ref 0–2)
LDLC SERPL CALC-MCNC: 60 MG/DL (ref 0–100)
LYMPHOCYTES # BLD AUTO: 2.57 THOUSANDS/ΜL (ref 0.6–4.47)
LYMPHOCYTES NFR BLD AUTO: 33 % (ref 14–44)
MCH RBC QN AUTO: 31.1 PG (ref 26.8–34.3)
MCHC RBC AUTO-ENTMCNC: 32.5 G/DL (ref 31.4–37.4)
MCV RBC AUTO: 96 FL (ref 82–98)
MONOCYTES # BLD AUTO: 0.46 THOUSAND/ΜL (ref 0.17–1.22)
MONOCYTES NFR BLD AUTO: 6 % (ref 4–12)
NEUTROPHILS # BLD AUTO: 4.62 THOUSANDS/ΜL (ref 1.85–7.62)
NEUTS SEG NFR BLD AUTO: 58 % (ref 43–75)
NONHDLC SERPL-MCNC: 79 MG/DL
NRBC BLD AUTO-RTO: 0 /100 WBCS
PLATELET # BLD AUTO: 201 THOUSANDS/UL (ref 149–390)
PMV BLD AUTO: 10.8 FL (ref 8.9–12.7)
POTASSIUM SERPL-SCNC: 4.1 MMOL/L (ref 3.5–5.3)
PROT SERPL-MCNC: 8.5 G/DL (ref 6.4–8.2)
RBC # BLD AUTO: 5.59 MILLION/UL (ref 3.88–5.62)
RPR SER QL: NORMAL
SODIUM SERPL-SCNC: 138 MMOL/L (ref 136–145)
TRIGL SERPL-MCNC: 97 MG/DL
TSH SERPL DL<=0.05 MIU/L-ACNC: 0.67 UIU/ML (ref 0.36–3.74)
WBC # BLD AUTO: 7.89 THOUSAND/UL (ref 4.31–10.16)

## 2020-04-22 PROCEDURE — 80053 COMPREHEN METABOLIC PANEL: CPT | Performed by: PHYSICIAN ASSISTANT

## 2020-04-22 PROCEDURE — 85025 COMPLETE CBC W/AUTO DIFF WBC: CPT | Performed by: PHYSICIAN ASSISTANT

## 2020-04-22 PROCEDURE — 99222 1ST HOSP IP/OBS MODERATE 55: CPT | Performed by: PSYCHIATRY & NEUROLOGY

## 2020-04-22 PROCEDURE — 86592 SYPHILIS TEST NON-TREP QUAL: CPT | Performed by: PHYSICIAN ASSISTANT

## 2020-04-22 PROCEDURE — NC001 PR NO CHARGE: Performed by: PSYCHIATRY & NEUROLOGY

## 2020-04-22 PROCEDURE — 80061 LIPID PANEL: CPT | Performed by: PHYSICIAN ASSISTANT

## 2020-04-22 PROCEDURE — 84443 ASSAY THYROID STIM HORMONE: CPT | Performed by: PHYSICIAN ASSISTANT

## 2020-04-22 PROCEDURE — 99252 IP/OBS CONSLTJ NEW/EST SF 35: CPT | Performed by: PHYSICIAN ASSISTANT

## 2020-04-22 RX ORDER — METHOCARBAMOL 500 MG/1
500 TABLET, FILM COATED ORAL EVERY 6 HOURS PRN
Status: DISCONTINUED | OUTPATIENT
Start: 2020-04-22 | End: 2020-04-22

## 2020-04-22 RX ORDER — DICYCLOMINE HYDROCHLORIDE 10 MG/1
10 CAPSULE ORAL 4 TIMES DAILY PRN
Status: DISCONTINUED | OUTPATIENT
Start: 2020-04-22 | End: 2020-04-23

## 2020-04-22 RX ORDER — RISPERIDONE 1 MG/1
1 TABLET, FILM COATED ORAL
Status: DISCONTINUED | OUTPATIENT
Start: 2020-04-22 | End: 2020-04-24 | Stop reason: HOSPADM

## 2020-04-22 RX ORDER — FLUOXETINE HYDROCHLORIDE 20 MG/1
20 CAPSULE ORAL DAILY
Status: DISCONTINUED | OUTPATIENT
Start: 2020-04-22 | End: 2020-04-24 | Stop reason: HOSPADM

## 2020-04-22 RX ORDER — CLONIDINE HYDROCHLORIDE 0.1 MG/1
0.1 TABLET ORAL EVERY 4 HOURS PRN
Status: DISCONTINUED | OUTPATIENT
Start: 2020-04-22 | End: 2020-04-24 | Stop reason: HOSPADM

## 2020-04-22 RX ORDER — ONDANSETRON 4 MG/1
4 TABLET, ORALLY DISINTEGRATING ORAL EVERY 6 HOURS PRN
Status: DISCONTINUED | OUTPATIENT
Start: 2020-04-22 | End: 2020-04-24 | Stop reason: HOSPADM

## 2020-04-22 RX ORDER — LOPERAMIDE HYDROCHLORIDE 2 MG/1
2 CAPSULE ORAL 4 TIMES DAILY PRN
Status: DISCONTINUED | OUTPATIENT
Start: 2020-04-22 | End: 2020-04-24 | Stop reason: HOSPADM

## 2020-04-22 RX ORDER — METHOCARBAMOL 500 MG/1
750 TABLET, FILM COATED ORAL EVERY 6 HOURS PRN
Status: DISCONTINUED | OUTPATIENT
Start: 2020-04-22 | End: 2020-04-24 | Stop reason: HOSPADM

## 2020-04-22 RX ADMIN — DICYCLOMINE HYDROCHLORIDE 10 MG: 10 CAPSULE ORAL at 05:46

## 2020-04-22 RX ADMIN — TRAZODONE HYDROCHLORIDE 50 MG: 50 TABLET ORAL at 21:51

## 2020-04-22 RX ADMIN — DICYCLOMINE HYDROCHLORIDE 10 MG: 10 CAPSULE ORAL at 23:52

## 2020-04-22 RX ADMIN — ONDANSETRON 4 MG: 4 TABLET, ORALLY DISINTEGRATING ORAL at 11:05

## 2020-04-22 RX ADMIN — LORAZEPAM 1 MG: 1 TABLET ORAL at 23:52

## 2020-04-22 RX ADMIN — LORAZEPAM 1 MG: 1 TABLET ORAL at 05:10

## 2020-04-22 RX ADMIN — METHOCARBAMOL TABLETS 750 MG: 500 TABLET, COATED ORAL at 18:57

## 2020-04-22 RX ADMIN — RISPERIDONE 1 MG: 1 TABLET ORAL at 21:51

## 2020-04-22 RX ADMIN — RISPERIDONE 1 MG: 1 TABLET, ORALLY DISINTEGRATING ORAL at 19:00

## 2020-04-22 RX ADMIN — DICYCLOMINE HYDROCHLORIDE 10 MG: 10 CAPSULE ORAL at 12:33

## 2020-04-22 RX ADMIN — CLONIDINE HYDROCHLORIDE 0.1 MG: 0.1 TABLET ORAL at 11:05

## 2020-04-22 RX ADMIN — CLONIDINE HYDROCHLORIDE 0.1 MG: 0.1 TABLET ORAL at 16:41

## 2020-04-22 RX ADMIN — FLUOXETINE 20 MG: 20 CAPSULE ORAL at 14:09

## 2020-04-23 PROCEDURE — 81001 URINALYSIS AUTO W/SCOPE: CPT | Performed by: PHYSICIAN ASSISTANT

## 2020-04-23 PROCEDURE — 99232 SBSQ HOSP IP/OBS MODERATE 35: CPT | Performed by: PHYSICIAN ASSISTANT

## 2020-04-23 RX ORDER — DICYCLOMINE HYDROCHLORIDE 10 MG/1
20 CAPSULE ORAL 4 TIMES DAILY PRN
Status: DISCONTINUED | OUTPATIENT
Start: 2020-04-23 | End: 2020-04-24 | Stop reason: HOSPADM

## 2020-04-23 RX ORDER — ALBUTEROL SULFATE 90 UG/1
2 AEROSOL, METERED RESPIRATORY (INHALATION) EVERY 6 HOURS PRN
Status: DISCONTINUED | OUTPATIENT
Start: 2020-04-23 | End: 2020-04-24 | Stop reason: HOSPADM

## 2020-04-23 RX ORDER — TRAZODONE HYDROCHLORIDE 100 MG/1
100 TABLET ORAL
Status: DISCONTINUED | OUTPATIENT
Start: 2020-04-23 | End: 2020-04-24 | Stop reason: HOSPADM

## 2020-04-23 RX ADMIN — METHOCARBAMOL TABLETS 750 MG: 500 TABLET, COATED ORAL at 20:05

## 2020-04-23 RX ADMIN — FLUOXETINE 20 MG: 20 CAPSULE ORAL at 08:51

## 2020-04-23 RX ADMIN — DICYCLOMINE HYDROCHLORIDE 20 MG: 10 CAPSULE ORAL at 13:46

## 2020-04-23 RX ADMIN — TRAZODONE HYDROCHLORIDE 100 MG: 100 TABLET ORAL at 23:31

## 2020-04-23 RX ADMIN — RISPERIDONE 1 MG: 1 TABLET ORAL at 21:59

## 2020-04-23 RX ADMIN — IBUPROFEN 600 MG: 600 TABLET, FILM COATED ORAL at 13:47

## 2020-04-23 RX ADMIN — CLONIDINE HYDROCHLORIDE 0.1 MG: 0.1 TABLET ORAL at 13:46

## 2020-04-23 RX ADMIN — METHOCARBAMOL TABLETS 750 MG: 500 TABLET, COATED ORAL at 08:51

## 2020-04-23 RX ADMIN — DICYCLOMINE HYDROCHLORIDE 20 MG: 10 CAPSULE ORAL at 20:05

## 2020-04-23 RX ADMIN — CLONIDINE HYDROCHLORIDE 0.1 MG: 0.1 TABLET ORAL at 20:05

## 2020-04-23 RX ADMIN — CLONIDINE HYDROCHLORIDE 0.1 MG: 0.1 TABLET ORAL at 08:51

## 2020-04-24 VITALS
HEIGHT: 65 IN | BODY MASS INDEX: 23.96 KG/M2 | SYSTOLIC BLOOD PRESSURE: 144 MMHG | RESPIRATION RATE: 17 BRPM | TEMPERATURE: 98.9 F | HEART RATE: 68 BPM | WEIGHT: 143.8 LBS | OXYGEN SATURATION: 95 % | DIASTOLIC BLOOD PRESSURE: 90 MMHG

## 2020-04-24 PROCEDURE — 99239 HOSP IP/OBS DSCHRG MGMT >30: CPT | Performed by: PHYSICIAN ASSISTANT

## 2020-04-24 RX ORDER — TRAZODONE HYDROCHLORIDE 100 MG/1
100 TABLET ORAL
Qty: 30 TABLET | Refills: 2 | Status: SHIPPED | OUTPATIENT
Start: 2020-04-24

## 2020-04-24 RX ORDER — HYDROXYZINE HYDROCHLORIDE 25 MG/1
25 TABLET, FILM COATED ORAL EVERY 6 HOURS PRN
Qty: 30 TABLET | Refills: 0 | Status: SHIPPED | OUTPATIENT
Start: 2020-04-24

## 2020-04-24 RX ORDER — RISPERIDONE 1 MG/1
1 TABLET, FILM COATED ORAL
Qty: 30 TABLET | Refills: 2 | Status: SHIPPED | OUTPATIENT
Start: 2020-04-24

## 2020-04-24 RX ORDER — FLUOXETINE HYDROCHLORIDE 20 MG/1
20 CAPSULE ORAL DAILY
Qty: 30 CAPSULE | Refills: 2 | Status: SHIPPED | OUTPATIENT
Start: 2020-04-25

## 2020-04-24 RX ADMIN — METHOCARBAMOL TABLETS 750 MG: 500 TABLET, COATED ORAL at 02:07

## 2020-04-24 RX ADMIN — DICYCLOMINE HYDROCHLORIDE 20 MG: 10 CAPSULE ORAL at 08:38

## 2020-04-24 RX ADMIN — METHOCARBAMOL TABLETS 750 MG: 500 TABLET, COATED ORAL at 08:38

## 2020-04-24 RX ADMIN — FLUOXETINE 20 MG: 20 CAPSULE ORAL at 08:38

## 2020-04-24 RX ADMIN — IBUPROFEN 600 MG: 600 TABLET, FILM COATED ORAL at 02:07

## 2020-04-27 LAB
BACTERIA UR QL AUTO: ABNORMAL /HPF
BILIRUB UR QL STRIP: ABNORMAL
CAOX CRY URNS QL MICRO: ABNORMAL /HPF
CLARITY UR: ABNORMAL
COLOR UR: ABNORMAL
GLUCOSE UR STRIP-MCNC: NEGATIVE MG/DL
HGB UR QL STRIP.AUTO: NEGATIVE
KETONES UR STRIP-MCNC: NEGATIVE MG/DL
LEUKOCYTE ESTERASE UR QL STRIP: ABNORMAL
NITRITE UR QL STRIP: NEGATIVE
NON-SQ EPI CELLS URNS QL MICRO: ABNORMAL /HPF
PH UR STRIP.AUTO: 7 [PH]
PROT UR STRIP-MCNC: ABNORMAL MG/DL
RBC #/AREA URNS AUTO: ABNORMAL /HPF
SP GR UR STRIP.AUTO: 1.02 (ref 1–1.03)
UROBILINOGEN UR QL STRIP.AUTO: 2 E.U./DL
WBC #/AREA URNS AUTO: ABNORMAL /HPF

## 2020-06-28 ENCOUNTER — HOSPITAL ENCOUNTER (EMERGENCY)
Facility: HOSPITAL | Age: 50
Discharge: HOME | End: 2020-06-28
Attending: EMERGENCY MEDICINE
Payer: COMMERCIAL

## 2020-06-28 VITALS
TEMPERATURE: 97.8 F | DIASTOLIC BLOOD PRESSURE: 84 MMHG | OXYGEN SATURATION: 95 % | WEIGHT: 145.5 LBS | RESPIRATION RATE: 18 BRPM | BODY MASS INDEX: 24.21 KG/M2 | HEART RATE: 110 BPM | SYSTOLIC BLOOD PRESSURE: 110 MMHG

## 2020-06-28 DIAGNOSIS — F19.10 SUBSTANCE ABUSE (HCC): Primary | ICD-10-CM

## 2020-06-28 PROCEDURE — 99283 EMERGENCY DEPT VISIT LOW MDM: CPT

## 2020-06-28 PROCEDURE — 99282 EMERGENCY DEPT VISIT SF MDM: CPT | Performed by: EMERGENCY MEDICINE

## 2020-06-29 NOTE — ED PROVIDER NOTES
History  Chief Complaint   Patient presents with    Recreational Drug Use     [employer] brought pt in, officer stated that they got a call for robery  officer stated that pt was just wondering around the outside of the store  pt did 5bag of heroin IV through out the day and smoked K2  pt is alert and oriented X# steady on his feet  offiicer states that he just wanted to make sure pt doesnt OD      82-year-old gentleman presents with police after being found confused and wandering in a convenience store  The patient admits to recently using heroin and K2  On arrival to the emergency department, the patient is awake, alert, and oriented  Denies any pain or other acute medical complaints  Addiction Problem   Similar prior episodes: yes    Severity:  Moderate  Onset quality:  Gradual  Timing:  Intermittent  Progression:  Waxing and waning  Chronicity:  Recurrent  Suspected agents:  Heroin and PCP  Associated symptoms: confusion    Associated symptoms: no abdominal pain, no agitation, no blackouts, no bladder incontinence, no bowel incontinence, no hallucinations, no headaches, no loss of consciousness, no nausea, no palpitations, no seizures, no shortness of breath, no somnolence, no suicidal ideation, no violence, no vomiting and no weakness        Prior to Admission Medications   Prescriptions Last Dose Informant Patient Reported? Taking?    FLUoxetine (PROzac) 20 mg capsule   No No   Sig: Take 1 capsule (20 mg total) by mouth daily At 9am   albuterol (VENTOLIN HFA) 90 mcg/act inhaler   No No   Sig: Inhale 2 puffs every 6 (six) hours as needed for wheezing   hydrOXYzine HCL (ATARAX) 25 mg tablet   No No   Sig: Take 1 tablet (25 mg total) by mouth every 6 (six) hours as needed (mild anxiety)   risperiDONE (RisperDAL) 1 mg tablet   No No   Sig: Take 1 tablet (1 mg total) by mouth daily at bedtime   traZODone (DESYREL) 100 mg tablet   No No   Sig: Take 1 tablet (100 mg total) by mouth daily at bedtime as needed for sleep      Facility-Administered Medications: None       Past Medical History:   Diagnosis Date    [facility] drug ([facility] )     Anxiety     Depression     Drug abuse ([facility] )     Hepatitis C virus     Psychiatric illness     Sleep apnea        Past Surgical History:   Procedure Laterality Date    CYST REMOVAL      ELBOW SURGERY         History reviewed  No pertinent family history  I have reviewed and agree with the history as documented  E-Cigarette/Vaping    E-Cigarette Use Never User      E-Cigarette/Vaping Substances    Nicotine No     THC No     CBD No     Flavoring No     Other No     Unknown No      Social History     Tobacco Use    Smoking status: Current Every Day Smoker     Packs/day: 0 25     Types: Cigarettes    Smokeless tobacco: Never Used   Substance Use Topics    Alcohol use: Not Currently     Frequency: Never     Binge frequency: Never    Drug use: Yes     Types: Heroin, PCP, Cocaine     Comment: k2, 5 bag or heroin IV daily        Review of Systems   Constitutional: Negative for activity change, chills, fatigue and fever  HENT: Negative  Negative for congestion, postnasal drip, rhinorrhea, sinus pain, sore throat and trouble swallowing  Eyes: Negative  Respiratory: Negative  Negative for shortness of breath  Cardiovascular: Negative for chest pain and palpitations  Gastrointestinal: Negative for abdominal pain, bowel incontinence, constipation, diarrhea, nausea and vomiting  Endocrine: Negative  Genitourinary: Negative  Negative for bladder incontinence  Musculoskeletal: Negative  Negative for arthralgias, back pain and myalgias  Skin: Negative  Allergic/Immunologic: Negative  Neurological: Negative  Negative for seizures, loss of consciousness, weakness and headaches  Hematological: Negative  Psychiatric/Behavioral: Positive for confusion  Negative for agitation, hallucinations and suicidal ideas         Physical Exam  Physical Exam Constitutional: He is oriented to person, place, and time  He appears well-developed and well-nourished  No distress  HENT:   Head: Normocephalic and atraumatic  Nose: Nose normal    Mouth/Throat: Oropharynx is clear and moist  No oropharyngeal exudate  Eyes: Pupils are equal, round, and reactive to light  Conjunctivae are normal    Neck: Neck supple  Cardiovascular: Normal rate, regular rhythm and normal heart sounds  Pulmonary/Chest: Effort normal and breath sounds normal  No respiratory distress  Abdominal: Soft  Bowel sounds are normal  He exhibits no distension  There is no tenderness  There is no guarding  Musculoskeletal: Normal range of motion  He exhibits no edema  Neurological: He is alert and oriented to person, place, and time  Skin: Skin is warm and dry  Capillary refill takes less than 2 seconds  He is not diaphoretic  Psychiatric: He has a normal mood and affect  His behavior is normal  Judgment and thought content normal  His speech is delayed  Cognition and memory are normal    Nursing note and vitals reviewed  Vital Signs  ED Triage Vitals [06/28/20 2148]   Temperature Pulse Respirations Blood Pressure SpO2   97 8 °F (36 6 °C) (!) 121 18 108/70 94 %      Temp Source Heart Rate Source Patient Position - Orthostatic VS BP Location FiO2 (%)   Tympanic Monitor Sitting Left arm --      Pain Score       --           Vitals:    06/28/20 2148   BP: 108/70   Pulse: (!) 121   Patient Position - Orthostatic VS: Sitting         Visual Acuity      ED Medications  Medications - No data to display    Diagnostic Studies  Results Reviewed     None                 No orders to display              Procedures  Procedures         ED Course                                             MDM  Number of Diagnoses or Management Options  Substance abuse [facility]):   Diagnosis management comments: 61-year-old gentleman presents with altered mental status after using heroin and K2    Patient states that he used his regular amount of both of these drugs  He denies any acute issues and remains fully awake, alert, and oriented  He had initially been confused ever police first encountered him but his sensorium has since cleared  Discussed need to reduce and abstain from illicit substance abuse  Police report that he will be cited and did not wish to place him under arrest   There are no grounds to hold him here against his wishes and the patient is requesting to leave  He is aware of reasons return to the ER  Disposition  Final diagnoses:   Substance abuse ([facility] [address] )     Time reflects when diagnosis was documented in both MDM as applicable and the Disposition within this note     Time User Action Codes Description Comment    6/28/2020  9:54 PM Jeni Gonzalez Add [F19 10] Substance abuse [facility])       ED Disposition     ED Disposition Condition Date/Time Comment    Discharge Stable Sun Jun 28, 2020  9:54 PM Daisha Bruce discharge to home/self care  Follow-up Information     Follow up With Specialties Details Why [facility] Emergency Department Emergency Medicine  If symptoms worsen [address] [address] [phone]          Patient's Medications   Discharge Prescriptions    No medications on file     No discharge procedures on file      PDMP Review     None          ED Provider  Electronically Signed by           Bobby Prince DO  06/28/20 3714

## 2020-07-16 ENCOUNTER — HOSPITAL ENCOUNTER (EMERGENCY)
Facility: HOSPITAL | Age: 50
End: 2020-07-17
Attending: EMERGENCY MEDICINE | Admitting: EMERGENCY MEDICINE
Payer: COMMERCIAL

## 2020-07-16 DIAGNOSIS — R45.851 SUICIDAL IDEATIONS: Primary | ICD-10-CM

## 2020-07-16 DIAGNOSIS — F19.10 POLYSUBSTANCE ABUSE (HCC): ICD-10-CM

## 2020-07-16 LAB — ETHANOL EXG-MCNC: 0 MG/DL

## 2020-07-16 PROCEDURE — 87635 SARS-COV-2 COVID-19 AMP PRB: CPT | Performed by: EMERGENCY MEDICINE

## 2020-07-16 PROCEDURE — 99283 EMERGENCY DEPT VISIT LOW MDM: CPT | Performed by: EMERGENCY MEDICINE

## 2020-07-16 PROCEDURE — 80307 DRUG TEST PRSMV CHEM ANLYZR: CPT | Performed by: EMERGENCY MEDICINE

## 2020-07-16 PROCEDURE — 82075 ASSAY OF BREATH ETHANOL: CPT | Performed by: EMERGENCY MEDICINE

## 2020-07-16 PROCEDURE — 99285 EMERGENCY DEPT VISIT HI MDM: CPT

## 2020-07-16 NOTE — LETTER
Section I - General Information    Name of Patient: Neha Jenkins                 : 1970    Medicare #:____________________  Transport Date: 20 (PCS is valid for round trips on this date and for all repetitive trips in the 60-day range as noted below )  Origin: [place] [address]                                                         Destination:________________________________________________  Is the pt's stay covered under Medicare Part A (PPS/DRG)     (_) YES  (_) NO  Closest appropriate facility?  (_) YES  (_) NO  If no, why is transport to more distant facility required?________________________  If hosp-hosp transfer, describe services needed at 2nd facility not available at 1st facility? _________________________________  If hospice pt, is this transport related to pt's terminal illness? (_) YES (_) NO Describe____________________________________    Section II - Medical Necessity Questionnaire  Ambulance transportation is medically necessary only if other means of transport are contraindicated or would be potentially harmful to the patient  To meet this requirement, the patient must either be "bed confined" or suffer from a condition such that transport by means other than ambulance is contraindicated by the patient's condition   The following questions must be answered by the medical professional signing below for this form to be valid:    1)  Describe the MEDICAL CONDITION (physical and/or mental) of this patient AT [address] [place] [address] that requires the patient to be transported in an ambulance and why transport by other means is contraindicated by the patient's condition:__________________________________________________________________________________________________    2) Is the patient "bed confined" as defined below?     (_) YES  (_) NO  To be "be confined" the patient must satisfy all three of the following conditions: (1) unable to get up from bed without Assistance; AND (2) unable to ambulate; AND (3) unable to sit in a chair or wheelchair  3) Can this patient safely be transported by car or wheelchair [facility] (i e , seated during transport without a medical attendant or monitoring)?   (_) YES  (_) NO    4) In addition to completing questions 1-3 above, please check any of the following conditions that apply*:  *Note: supporting documentation for any boxes checked must be maintained in the patient's medical records  (_)Contractures   (_)Non-Healed Fractures  (_)Patient is confused (_)Patient is comatose (_)Moderate/severe pain on movement (_)Danger to self/others  (_)IV meds/fluids required (_)Patient is combative(_)Need or possible need for restraints (_)DVT requires elevation of lower extremity  (_)Medical attendant required (_)Requires oxygen-unable to self administer (_)Special handling/isolation/infection control precautions required (_)Unable to tolerate seated position for time needed to transport (_)Hemodynamic monitoring required en route (_)Unable to sit in a chair or wheelchair due to decubitus ulcers or other wounds (_)Cardiac monitoring required en route (_)Morbid obesity requires additional personnel/equipment to safely handle patient (_)Orthopedic device (backboard, halo, pins, traction, brace, wedge, etc,) requiring special handling during transport (_)Other(specify)_______________________________________________    Section III - Signature of Physician or Healthcare Professional  I certify that the above information is true and correct based on my evaluation of this patient, and represent that the patient requires transport by ambulance and that other forms of transport are contraindicated   I understand that this information will be used by the Centers for Medicare and Medicaid Services (CMS) to support the determination of medical necessity for ambulance services, and I represent that I have personal knowledge of the patient's condition at time of transport  (_) If this box is checked, I also certify that the patient is physically or mentally incapable of signing the ambulance service's claim and that the institution with which I am affiliated has furnished care, services, or assistance to the patient  My signature below is made on behalf of the patient pursuant to 42 CFR §424 36(b)(4)  In accordance with 42 CFR §424 37, the specific reason(s) that the patient is physically or mentally incapable of signing the claim form is as follows: _________________________________________________________________________________________________________      Signature of Physician* or Healthcare Professional______________________________________________________________  Signature Date 07/17/20 (For scheduled repetitive transports, this form is not valid for transports performed more than 60 days after this date)    Printed Name & Credentials of Physician or Healthcare Professional (MD, DO, RN, etc )________________________________  *Form must be signed by patient's attending physician for scheduled, repetitive transports   For non-repetitive, unscheduled ambulance transports, if unable to obtain the signature of the attending physician, any of the following may sign (choose appropriate option below)  (_) Physician Assistant (_)  Clinical Nurse Specialist (_)  Registered Nurse  (_)  Nurse Practitioner  (_) Discharge Planner

## 2020-07-16 NOTE — LETTER
Section I - General Information    Name of Patient: Blossom Garcia                 : 1970    Medicare #:____________________  Transport Date: 20 (PCS is valid for round trips on this date and for all repetitive trips in the 60-day range as noted below )  Origin: [place] [address]                                                         Destination:________________________________________________  Is the pt's stay covered under Medicare Part A (PPS/DRG)     (_) YES  (_) NO  Closest appropriate facility?  (_) YES  (_) NO  If no, why is transport to more distant facility required?________________________  If hosp-hosp transfer, describe services needed at 2nd facility not available at 1st facility? _________________________________  If hospice pt, is this transport related to pt's terminal illness? (_) YES (_) NO Describe____________________________________    Section II - Medical Necessity Questionnaire  Ambulance transportation is medically necessary only if other means of transport are contraindicated or would be potentially harmful to the patient  To meet this requirement, the patient must either be "bed confined" or suffer from a condition such that transport by means other than ambulance is contraindicated by the patient's condition   The following questions must be answered by the medical professional signing below for this form to be valid:    1)  Describe the MEDICAL CONDITION (physical and/or mental) of this patient AT [address] [place] [address] that requires the patient to be transported in an ambulance and why transport by other means is contraindicated by the patient's condition:__________________________________________________________________________________________________    2) Is the patient "bed confined" as defined below?     (_) YES  (_) NO  To be "be confined" the patient must satisfy all three of the following conditions: (1) unable to get up from bed without Assistance; AND (2) unable to ambulate; AND (3) unable to sit in a chair or wheelchair  3) Can this patient safely be transported by car or wheelchair [facility] (i e , seated during transport without a medical attendant or monitoring)?   (_) YES  (_) NO    4) In addition to completing questions 1-3 above, please check any of the following conditions that apply*:  *Note: supporting documentation for any boxes checked must be maintained in the patient's medical records  (_)Contractures   (_)Non-Healed Fractures  (_)Patient is confused (_)Patient is comatose (_)Moderate/severe pain on movement (_)Danger to self/others  (_)IV meds/fluids required (_)Patient is combative(_)Need or possible need for restraints (_)DVT requires elevation of lower extremity  (_)Medical attendant required (_)Requires oxygen-unable to self administer (_)Special handling/isolation/infection control precautions required (_)Unable to tolerate seated position for time needed to transport (_)Hemodynamic monitoring required en route (_)Unable to sit in a chair or wheelchair due to decubitus ulcers or other wounds (_)Cardiac monitoring required en route (_)Morbid obesity requires additional personnel/equipment to safely handle patient (_)Orthopedic device (backboard, halo, pins, traction, brace, wedge, etc,) requiring special handling during transport (_)Other(specify)_______________________________________________    Section III - Signature of Physician or Healthcare Professional  I certify that the above information is true and correct based on my evaluation of this patient, and represent that the patient requires transport by ambulance and that other forms of transport are contraindicated   I understand that this information will be used by the Centers for Medicare and Medicaid Services (CMS) to support the determination of medical necessity for ambulance services, and I represent that I have personal knowledge of the patient's condition at time of transport  (_) If this box is checked, I also certify that the patient is physically or mentally incapable of signing the ambulance service's claim and that the institution with which I am affiliated has furnished care, services, or assistance to the patient  My signature below is made on behalf of the patient pursuant to 42 CFR §424 36(b)(4)  In accordance with 42 CFR §424 37, the specific reason(s) that the patient is physically or mentally incapable of signing the claim form is as follows: _________________________________________________________________________________________________________      Signature of Physician* or Healthcare Professional______________________________________________________________  Signature Date 07/17/20 (For scheduled repetitive transports, this form is not valid for transports performed more than 60 days after this date)    Printed Name & Credentials of Physician or Healthcare Professional (MD, DO, RN, etc )________________________________  *Form must be signed by patient's attending physician for scheduled, repetitive transports   For non-repetitive, unscheduled ambulance transports, if unable to obtain the signature of the attending physician, any of the following may sign (choose appropriate option below)  (_) Physician Assistant (_)  Clinical Nurse Specialist (_)  Registered Nurse  (_)  Nurse Practitioner  (_) Discharge Planner

## 2020-07-17 VITALS
SYSTOLIC BLOOD PRESSURE: 128 MMHG | HEART RATE: 64 BPM | WEIGHT: 145.5 LBS | DIASTOLIC BLOOD PRESSURE: 83 MMHG | TEMPERATURE: 99 F | RESPIRATION RATE: 18 BRPM | BODY MASS INDEX: 24.21 KG/M2 | OXYGEN SATURATION: 100 %

## 2020-07-17 LAB
AMPHETAMINES SERPL QL SCN: NEGATIVE
BARBITURATES UR QL: NEGATIVE
BENZODIAZ UR QL: NEGATIVE
COCAINE UR QL: POSITIVE
METHADONE UR QL: NEGATIVE
OPIATES UR QL SCN: POSITIVE
OXYCODONE+OXYMORPHONE UR QL SCN: NEGATIVE
PCP UR QL: NEGATIVE
SARS-COV-2 RNA RESP QL NAA+PROBE: NEGATIVE
THC UR QL: POSITIVE

## 2020-07-17 NOTE — ED NOTES
Assumed care of pt at this time, pt ambulatory to and from bathroom with even and steady gait  Pt remains on 1:1 observation          Gareth Louis RN  07/17/20 1374

## 2020-07-17 NOTE — ED NOTES
Pt remains sleeping, normal non labored respirations observed  1:1 observer remains at bedside        Shayna Santoro RN  07/17/20 2758

## 2020-07-17 NOTE — ED NOTES
Under review at [facility]  If [facility] is unable to accept patient, he will be referred to [facility] for a network bed  Sent the 201 to [facility] for possible review

## 2020-07-17 NOTE — ED NOTES
Patient is accepted at [facility]  Patient is accepted by [facility]  per  [facility]    Transportation is arranged with [facility]  Transportation is scheduled for TBD

## 2020-07-17 NOTE — ED NOTES
Patient states that he has not taken or filled his regular medications in two years       Keara Riddle RN  07/16/20 7369

## 2020-07-17 NOTE — ED NOTES
Assumed care of pt at this time; pt resting in room comfortably; pt being monitored by [facility] on a 1-1 visual observation which is being recorded on ED behavior health observation record       Rosa Isela Cotton RN  07/17/20 1570

## 2020-07-17 NOTE — ED NOTES
[facility] in department to transport pt, items locked up with security and all personal items in locker #13 handed to transport team       Nilton Price RN  07/17/20 4303

## 2020-07-17 NOTE — ED PROVIDER NOTES
History  Chief Complaint   Patient presents with    Psychiatric Evaluation     SI x2 years  reports homeless and IV drug user  denies HI/ VH/AH     53 yo male presents voluntarily asking for help with "lots of stress", explaining that he lives [place] the [address], and is addicted to cocaine and heroin, which he uses daily, and this causes him to frequently feel depressed and hopeless with suicidal thoughts, that have now exacerbated today, he is considering a plan "to stab myself"  He was last admitted in April for behavioral health  History provided by:  Patient      Prior to Admission Medications   Prescriptions Last Dose Informant Patient Reported? Taking?    FLUoxetine (PROzac) 20 mg capsule Not Taking at Unknown time  No No   Sig: Take 1 capsule (20 mg total) by mouth daily At 9am   Patient not taking: Reported on 7/16/2020   albuterol (VENTOLIN HFA) 90 mcg/act inhaler Not Taking at Unknown time  No No   Sig: Inhale 2 puffs every 6 (six) hours as needed for wheezing   Patient not taking: Reported on 7/16/2020   hydrOXYzine HCL (ATARAX) 25 mg tablet Not Taking at Unknown time  No No   Sig: Take 1 tablet (25 mg total) by mouth every 6 (six) hours as needed (mild anxiety)   Patient not taking: Reported on 7/16/2020   risperiDONE (RisperDAL) 1 mg tablet Not Taking at Unknown time  No No   Sig: Take 1 tablet (1 mg total) by mouth daily at bedtime   Patient not taking: Reported on 7/16/2020   traZODone (DESYREL) 100 mg tablet Not Taking at Unknown time  No No   Sig: Take 1 tablet (100 mg total) by mouth daily at bedtime as needed for sleep   Patient not taking: Reported on 7/16/2020      Facility-Administered Medications: None       Past Medical History:   Diagnosis Date    Addiction to drug ([facility] [address] )     Anxiety     Depression     Drug abuse ([facility] )     Hepatitis C virus     Psychiatric illness     Sleep apnea        Past Surgical History:   Procedure Laterality Date    CYST REMOVAL      ELBOW SURGERY History reviewed  No pertinent family history  I have reviewed and agree with the history as documented  E-Cigarette/Vaping    E-Cigarette Use Never User      E-Cigarette/Vaping Substances    Nicotine No     THC No     CBD No     Flavoring No     Other No     Unknown No      Social History     Tobacco Use    Smoking status: Current Every Day Smoker     Packs/day: 1 00     Types: Cigarettes    Smokeless tobacco: Never Used   Substance Use Topics    Alcohol use: Not Currently     Frequency: Never     Binge frequency: Never    Drug use: Yes     Types: Heroin, PCP, Cocaine     Comment: k2, 5 bag or heroin IV daily        Review of Systems   Constitutional: Negative for appetite change, chills and fever  HENT: Negative for sore throat  Respiratory: Negative for cough, shortness of breath and wheezing  Cardiovascular: Negative for chest pain and palpitations  Gastrointestinal: Negative for abdominal pain, diarrhea, nausea and vomiting  Genitourinary: Negative for dysuria and hematuria  Musculoskeletal: Negative for neck pain  Skin: Negative for rash  Neurological: Negative for dizziness, weakness and headaches  Psychiatric/Behavioral: Negative for suicidal ideas  The patient is nervous/anxious  All other systems reviewed and are negative  Physical Exam  Physical Exam   Constitutional: He is oriented to person, place, and time  He appears well-developed and well-nourished  HENT:   Head: Normocephalic and atraumatic  Right Ear: External ear normal    Left Ear: External ear normal    Nose: Nose normal    Mouth/Throat: Oropharynx is clear and moist    Eyes: Pupils are equal, round, and reactive to light  Conjunctivae and EOM are normal    Neck: Normal range of motion  Neck supple  Cardiovascular: Normal rate  Pulmonary/Chest: Effort normal    Abdominal: There is no tenderness  Musculoskeletal: Normal range of motion     Neurological: He is alert and oriented to person, place, and time  No cranial nerve deficit  Coordination normal    Skin: Skin is warm and dry  Capillary refill takes less than 2 seconds  Psychiatric: His behavior is normal  Judgment normal  He exhibits a depressed mood  Nursing note and vitals reviewed  Vital Signs  ED Triage Vitals [07/16/20 2300]   Temperature Pulse Respirations Blood Pressure SpO2   99 7 °F (37 6 °C) 95 18 120/98 94 %      Temp Source Heart Rate Source Patient Position - Orthostatic VS BP Location FiO2 (%)   Oral Monitor Sitting Right arm --      Pain Score       --           Vitals:    07/16/20 2300   BP: 120/98   Pulse: 95   Patient Position - Orthostatic VS: Sitting         Visual Acuity      ED Medications  Medications - No data to display    Diagnostic Studies  Results Reviewed     Procedure Component Value Units Date/Time    POCT alcohol breath test [[id number]]  (Normal) Resulted:  07/16/20 2332    Lab Status:  Final result Updated:  07/16/20 2332     EXTBreath Alcohol 0 00    Novel Coronavirus [facility] [[id number]] Collected:  07/16/20 2322    Lab Status: In process Specimen:  Nares from Nose Updated:  07/16/20 2326    Rapid drug screen, urine [[id number]] Collected:  07/16/20 2323    Lab Status:   In process Specimen:  Urine, Clean Catch Updated:  07/16/20 2326                 No orders to display              Procedures  Procedures         ED Course                                             MDM      Disposition  Final diagnoses:   Suicidal ideations   Polysubstance abuse ([address] )     Time reflects when diagnosis was documented in both MDM as applicable and the Disposition within this note     Time User Action Codes Description Comment    7/16/2020 11:38 PM Daniela Situ Add [U17 463] Suicidal ideations     7/16/2020 11:38 PM Daniela Situ Add [F19 10] Polysubstance abuse [facility])       ED Disposition     None      Follow-up Information    None         Patient's Medications   Discharge Prescriptions    No medications on file     No discharge procedures on file      PDMP Review     None          ED Provider  Electronically Signed by           Hank Leal MD  07/16/20 79 Yenni Alberto MD  07/16/20 9914

## 2020-07-17 NOTE — ED NOTES
Pt sleeping, easily arousable to voice to sign transfer consent  Pt made aware he has estimated p/u time of 1500 to [facility] Robles RN  07/17/20 1932

## 2020-07-17 NOTE — ED NOTES
Correction  Crisis worker woke patient again and asked for additional clarification about his drug use  This time he was more awake and said he injects 10-12 bags of heroin daily, and occasionally uses up to 13, injects 3-5 bags of cocaine, and smokes 3-5 bags of k2  He stated he doesn't recall saying 30 earlier, and confirmed twice that this is the correct amount

## 2020-07-17 NOTE — ED NOTES
Patient reports SI with a plan to stab himself  He came to the ED registration desk and tossed a pocket knife and a single blade sharp dagger on the registration desk and reported he wanted to kill himself with them  He reports a lifelong history of drug use as well as depression and suicidal ideations  He has had previous hospitalizations, most recent known being this past April, but he reports that as soon as he leaves he starts using again and stops taking his prescribed psychiatric medications  He reports that once he is through withdrawal he does feel better while inpatient and on meds, however he is unable to stop himself from using again when he has the opportunity  He states he is not interested in rehab at this time, that he needs to get over feeling suicidal and then might be interested in drug treatment  He denies HI and psychosis from mental illness  He does report that he hallucinates often when under the influence of drugs, however

## 2020-07-17 NOTE — ED NOTES
Insurance Authorization for admission:   Phone call placed to [facility] number: [phone]    Spoke to Thierno B      3 days approved  Level of care: acute inpatient dual  Review on 7/20/20   Authorization # provided to accepting facility upon admission notification        EVS (Eligibility Verification System) called - [phone]    Automated system indicates: active [employer] for [employer]:  TBD

## 2020-07-17 NOTE — ED NOTES
Pt sleeping, normal non labored respirations observed  A/w transport to Manish Salgado RN  07/17/20 5215

## 2020-07-17 NOTE — ED NOTES
Discussed case with SL /Crisis worker who agreed that [facility] would be the most appropriate treatment program for this patient  When this cw called them earlier in search of a bed they said to call back on first shift  Suggestion for day shift is to contact [facility] and perhaps [facility] and see if they have an appropriate dual bed  If not, suggestion from our Intake Department was to try to refer him to [facility]

## 2020-07-17 NOTE — EMTALA/ACUTE CARE TRANSFER
[facility]  [address] [facility] [address]  Dept: [phone]      EMTALA TRANSFER CONSENT    NAME Lupe Lindo                                         1970                              MRN [id number]    I have been informed of my rights regarding examination, treatment, and transfer   by Dr Du Nguyễn, *    Benefits: Continuity of care    Risks: Potential for delay in receiving treatment, Increased discomfort during transfer      Consent for Transfer:  I acknowledge that my medical condition has been evaluated and explained to me by the emergency department physician or other qualified medical person and/or my attending physician, who has recommended that I be transferred to the service of  Accepting Physician: Dr Tracy Busby at [address] Name, [address] : [place]inita Kehr  The above potential benefits of such transfer, the potential risks associated with such transfer, and the probable risks of not being transferred have been explained to me, and I fully understand them  The doctor has explained that, in my case, the benefits of transfer outweigh the risks  I agree to be transferred  I authorize the performance of emergency medical procedures and treatments upon me in both transit and upon arrival at the receiving facility  Additionally, I authorize the release of any and all medical records to the receiving facility and request they be transported with me, if possible  I understand that the safest mode of transportation during a medical emergency is an ambulance and that the Hospital advocates the use of this mode of transport  Risks of traveling to the receiving facility by car, including absence of medical control, life sustaining equipment, such as oxygen, and medical personnel has been explained to me and I fully understand them      (NIDIA CORRECT BOX BELOW)  [  ]  I consent to the stated transfer and to be transported by ambulance/helicopter  [  ]  I consent to the stated transfer, but refuse transportation by ambulance and accept full responsibility for my transportation by car  I understand the risks of non-ambulance transfers and I exonerate the Hospital and its staff from any deterioration in my condition that results from this refusal     X___________________________________________    DATE  20  TIME________  Signature of patient or legally responsible individual signing on patient behalf           RELATIONSHIP TO PATIENT_________________________          Provider Certification    NAME Lonnie Fowler                                         1970                              MRN [id number]    A medical screening exam was performed on the above named patient  Based on the examination:    Condition Necessitating Transfer The primary encounter diagnosis was Suicidal ideations  A diagnosis of Polysubstance abuse ([facility] [address] ) was also pertinent to this visit  Patient Condition: The patient has been stabilized such that within reasonable medical probability, no material deterioration of the patient condition or the condition of the unborn child(moy) is likely to result from the transfer    Reason for Transfer: Level of Care needed not available at this facility    Transfer Requirements: [address] [facility],[address]   · Space available and qualified personnel available for treatment as acknowledged by Justin Moore  · Agreed to accept transfer and to provide appropriate medical treatment as acknowledged by       Dr aMriposa Jeffery  · Appropriate medical records of the examination and treatment of the patient are provided at the time of transfer   [address] _______  · Transfer will be performed by qualified personnel from [address] [place] [address]  and appropriate transfer equipment as required, including the use of necessary and appropriate life support measures      Provider Certification: I have examined the patient and explained the following risks and benefits of being transferred/refusing transfer to the patient/family:  General risk, such as traffic hazards, adverse weather conditions, rough terrain or turbulence, possible failure of equipment (including vehicle or aircraft), or consequences of actions of persons outside the control of the transport personnel      Based on these reasonable risks and benefits to the patient and/or the unborn child(moy), and based upon the information available at the time of the patients examination, I certify that the medical benefits reasonably to be expected from the provision of appropriate medical treatments at another medical facility outweigh the increasing risks, if any, to the individuals medical condition, and in the case of labor to the unborn child, from effecting the transfer      X____________________________________________ DATE 07/17/20        TIME_______      ORIGINAL - SEND TO MEDICAL RECORDS   COPY - SEND WITH PATIENT DURING TRANSFER

## 2020-07-17 NOTE — ED NOTES
Patient signed a voluntary admission form  EVS indicated he has active MA through [facility]  Due to his extensive drug use, he would be better served in a true dual program if possible  [facility] and they do not have an available bed at this time  [address]  and they do have a male dual bed and are willing to review patient's referral  Faxed referral to [facility] for review

## 2020-07-17 NOTE — ED NOTES
Assumed care of pt at this time, pt in bed none-labored respirations, see paper charting by floor PCA for continuous 1:1 shakila Lui RN  07/17/20 8670

## 2020-09-09 ENCOUNTER — HOSPITAL ENCOUNTER (EMERGENCY)
Facility: HOSPITAL | Age: 50
Discharge: HOME | End: 2020-09-09
Attending: EMERGENCY MEDICINE | Admitting: EMERGENCY MEDICINE
Payer: COMMERCIAL

## 2020-09-09 VITALS
WEIGHT: 160 LBS | OXYGEN SATURATION: 98 % | HEART RATE: 56 BPM | DIASTOLIC BLOOD PRESSURE: 97 MMHG | SYSTOLIC BLOOD PRESSURE: 124 MMHG | RESPIRATION RATE: 20 BRPM | TEMPERATURE: 97.1 F | BODY MASS INDEX: 26.63 KG/M2

## 2020-09-09 DIAGNOSIS — F11.10 HEROIN ABUSE (HCC): ICD-10-CM

## 2020-09-09 DIAGNOSIS — F19.10 SUBSTANCE ABUSE (HCC): Primary | ICD-10-CM

## 2020-09-09 LAB
AMPHETAMINES SERPL QL SCN: NEGATIVE
BARBITURATES UR QL: NEGATIVE
BENZODIAZ UR QL: NEGATIVE
COCAINE UR QL: POSITIVE
ETHANOL EXG-MCNC: 0 MG/DL
GLUCOSE SERPL-MCNC: 85 MG/DL (ref 65–140)
METHADONE UR QL: NEGATIVE
OPIATES UR QL SCN: POSITIVE
OXYCODONE+OXYMORPHONE UR QL SCN: NEGATIVE
PCP UR QL: NEGATIVE
SARS-COV-2 RNA RESP QL NAA+PROBE: NEGATIVE
THC UR QL: NEGATIVE

## 2020-09-09 PROCEDURE — 80307 DRUG TEST PRSMV CHEM ANLYZR: CPT | Performed by: EMERGENCY MEDICINE

## 2020-09-09 PROCEDURE — 99284 EMERGENCY DEPT VISIT MOD MDM: CPT

## 2020-09-09 PROCEDURE — 82075 ASSAY OF BREATH ETHANOL: CPT | Performed by: EMERGENCY MEDICINE

## 2020-09-09 PROCEDURE — 87635 SARS-COV-2 COVID-19 AMP PRB: CPT | Performed by: EMERGENCY MEDICINE

## 2020-09-09 PROCEDURE — 99284 EMERGENCY DEPT VISIT MOD MDM: CPT | Performed by: EMERGENCY MEDICINE

## 2020-09-09 PROCEDURE — 82948 REAGENT STRIP/BLOOD GLUCOSE: CPT

## 2020-09-09 RX ORDER — LORAZEPAM 0.5 MG/1
0.5 TABLET ORAL ONCE
Status: COMPLETED | OUTPATIENT
Start: 2020-09-09 | End: 2020-09-09

## 2020-09-09 RX ORDER — ONDANSETRON 4 MG/1
4 TABLET, ORALLY DISINTEGRATING ORAL ONCE
Status: COMPLETED | OUTPATIENT
Start: 2020-09-09 | End: 2020-09-09

## 2020-09-09 RX ORDER — LORAZEPAM 0.5 MG/1
1 TABLET ORAL ONCE
Status: COMPLETED | OUTPATIENT
Start: 2020-09-09 | End: 2020-09-09

## 2020-09-09 RX ORDER — CLONIDINE HYDROCHLORIDE 0.1 MG/1
0.2 TABLET ORAL ONCE
Status: COMPLETED | OUTPATIENT
Start: 2020-09-09 | End: 2020-09-09

## 2020-09-09 RX ADMIN — CLONIDINE HYDROCHLORIDE 0.2 MG: 0.1 TABLET ORAL at 12:06

## 2020-09-09 RX ADMIN — ONDANSETRON 4 MG: 4 TABLET, ORALLY DISINTEGRATING ORAL at 06:11

## 2020-09-09 RX ADMIN — LORAZEPAM 1 MG: 0.5 TABLET ORAL at 10:42

## 2020-09-09 RX ADMIN — LORAZEPAM 0.5 MG: 0.5 TABLET ORAL at 06:11

## 2020-09-09 RX ADMIN — LORAZEPAM 1 MG: 0.5 TABLET ORAL at 12:06

## 2020-09-09 NOTE — ED NOTES
Bed is available at [facility], however, pt will not talk on the phone,  Was medicated and too sleepy     Will try later

## 2020-09-09 NOTE — ED NOTES
Insurance         EVS (Eligibility Verification System) called - [phone]    Automated system indicates: active [employer]

## 2020-09-09 NOTE — Clinical Note
Krystyna Hoover was seen and treated in our emergency department on 9/9/2020  Diagnosis:     Junior    He may return on this date: If you have any questions or concerns, please don't hesitate to call        Heidi Cox DO    ______________________________           _______________          _______________  Hospital Representative                              Date                                Time

## 2020-09-09 NOTE — ED TRIAGE NOTES
Patient asking for longer rehab for his heroin addiction of 3-4 bundles IV daily  Last use yesterday afternoon  Denies withdrawal symptoms at this time  Denies suicidal/homicidal ideations  No hallucinations

## 2020-09-09 NOTE — ED PROVIDER NOTES
[facility]  Chief Complaint   Patient presents with    Addiction Problem     Patient is a 51-year-old male with a history of hepatitis and drug abuse coming in asking for assistance for his withdrawal/drug abuse  He states that he uses approximately 3 bundles of heroin IV daily  His last use was approximately 3:00 p m  Yesterday  He also smoked K2 at that time  He denies any alcohol or any other drugs  He has been using for 2 years straight  He has no other complaints at this time  He is not suicidal or homicidal    He has no auditory or visual hallucinations  He states when he withdrawals he has high anxiety, diaphoresis nausea vomiting  At this time he has not had those  History provided by:  Patient   used: No    Addiction Problem   Similar prior episodes: yes    Severity:  Unable to specify  Onset quality:  Unable to specify  Timing:  Unable to specify  Chronicity:  Recurrent  Suspected agents: Heroin and K2  Associated symptoms: no abdominal pain, no agitation, no blackouts, no bladder incontinence, no bowel incontinence, no confusion, no hallucinations, no headaches, no loss of consciousness, no nausea, no palpitations, no seizures, no shortness of breath, no somnolence, no suicidal ideation, no violence, no vomiting and no weakness    Risk factors: addiction treatment, chronic illness, psychiatric hx and withdrawal syndrome        Prior to Admission Medications   Prescriptions Last Dose Informant Patient Reported? Taking?    FLUoxetine (PROzac) 20 mg capsule   No No   Sig: Take 1 capsule (20 mg total) by mouth daily At 9am   Patient not taking: Reported on 7/16/2020   albuterol (VENTOLIN HFA) 90 mcg/act inhaler   No No   Sig: Inhale 2 puffs every 6 (six) hours as needed for wheezing   Patient not taking: Reported on 7/16/2020   hydrOXYzine HCL (ATARAX) 25 mg tablet   No No   Sig: Take 1 tablet (25 mg total) by mouth every 6 (six) hours as needed (mild anxiety)   Patient not taking: Reported on 7/16/2020   risperiDONE (RisperDAL) 1 mg tablet   No No   Sig: Take 1 tablet (1 mg total) by mouth daily at bedtime   Patient not taking: Reported on 7/16/2020   traZODone (DESYREL) 100 mg tablet   No No   Sig: Take 1 tablet (100 mg total) by mouth daily at bedtime as needed for sleep   Patient not taking: Reported on 7/16/2020      Facility-Administered Medications: None       Past Medical History:   Diagnosis Date    Addiction to drug ([place] [address] )     Anxiety     Depression     Drug abuse ([place] [address] )     Hepatitis C virus     Psychiatric illness     Sleep apnea        Past Surgical History:   Procedure Laterality Date    CYST REMOVAL      ELBOW SURGERY         History reviewed  No pertinent family history  I have reviewed and agree with the history as documented  E-Cigarette/Vaping    E-Cigarette Use Never User      E-Cigarette/Vaping Substances    Nicotine No     THC No     CBD No     Flavoring No     Other No     Unknown No      Social History     Tobacco Use    Smoking status: Current Every Day Smoker     Packs/day: 1 00     Types: Cigarettes    Smokeless tobacco: Never Used   Substance Use Topics    Alcohol use: Not Currently     Frequency: Never     Binge frequency: Never    Drug use: Yes     Types: Heroin, Cocaine     Comment: iv heroin and cocaine, smokes k2 also, all daily       Review of Systems   Constitutional: Negative  Negative for diaphoresis and fever  HENT: Negative  Negative for ear pain and sore throat  Eyes: Negative  Negative for visual disturbance  Respiratory: Negative  Negative for chest tightness and shortness of breath  Cardiovascular: Negative  Negative for chest pain and palpitations  Gastrointestinal: Negative  Negative for abdominal pain, bowel incontinence, nausea and vomiting  Genitourinary: Negative  Negative for bladder incontinence, difficulty urinating and dysuria  Musculoskeletal: Negative    Negative for back pain and neck pain    Skin: Negative for rash  Neurological: Negative for seizures, loss of consciousness, weakness and headaches  Hematological: Negative  Psychiatric/Behavioral: Negative  Negative for agitation, confusion, hallucinations and suicidal ideas  All other systems reviewed and are negative  Physical Exam  Physical Exam  Vitals signs and nursing note reviewed  Constitutional:       General: He is not in acute distress  Appearance: He is well-developed  He is not diaphoretic  Comments: Patient appears older than stated age   HENT:      Head: Normocephalic and atraumatic  Comments: Patient maintaining airway and maintaining secretions  No stridor  Eyes:      Conjunctiva/sclera: Conjunctivae normal       Pupils: Pupils are equal, round, and reactive to light  Neck:      Musculoskeletal: Normal range of motion and neck supple  Cardiovascular:      Rate and Rhythm: Normal rate and regular rhythm  Heart sounds: Normal heart sounds  No murmur  Pulmonary:      Effort: Pulmonary effort is normal  No respiratory distress  Breath sounds: Normal breath sounds  No stridor  Abdominal:      General: Bowel sounds are normal  There is no distension  Palpations: Abdomen is soft  Tenderness: There is no abdominal tenderness  Musculoskeletal: Normal range of motion  Comments: Patient moves bilateral upper extremities and lower extremities independently of each other pain-free   Skin:     General: Skin is warm  Capillary Refill: Capillary refill takes less than 2 seconds  Neurological:      Mental Status: He is alert and oriented to person, place, and time  Cranial Nerves: No cranial nerve deficit  Comments: GCS 15  No slurred speech  No facial asymmetry    No ataxia           Vital Signs  ED Triage Vitals   Temperature Pulse Respirations Blood Pressure SpO2   09/09/20 0507 09/09/20 0507 09/09/20 0507 09/09/20 0507 09/09/20 0612   (!) 97 1 °F (36 2 °C) 56 16 155/99 99 %      Temp src Heart Rate Source Patient Position - Orthostatic VS BP Location FiO2 (%)   -- 09/09/20 0507 09/09/20 0507 09/09/20 0507 --    Monitor Lying Left arm       Pain Score       --                  Vitals:    09/09/20 0507 09/09/20 0612   BP: 155/99 131/84   Pulse: 56 56   Patient Position - Orthostatic VS: Lying Lying         Visual Acuity      ED Medications  Medications   LORazepam (ATIVAN) tablet 0 5 mg (0 5 mg Oral Given 9/9/20 0611)   ondansetron (ZOFRAN-ODT) dispersible tablet 4 mg (4 mg Oral Given 9/9/20 0611)       Diagnostic Studies  Results Reviewed     Procedure Component Value Units Date/Time    [facility] [[id number]]  (Normal) Collected:  09/09/20 0523    Lab Status:  Final result Specimen:  Nares from Nose Updated:  09/09/20 0659     SARS-CoV-2 Negative    Narrative: The specimen collection materials, transport medium, and/or testing methodology utilized in the production of these test results have been proven to be reliable in a limited validation with an abbreviated program under the Emergency Utilization Authorization provided by the FDA  Testing reported as "Presumptive positive" will be confirmed with secondary testing with a reference laboratory to ensure result accuracy  Clinical caution and judgement should be used with the interpretation of these results with consideration of the clinical impression and other laboratory testing  Testing reported as "Positive" or "Negative" has been proven to be accurate according to standard laboratory validation requirements  All testing is performed with control materials showing appropriate reactivity at standard intervals        Fingerstick Glucose (POCT) [[id number]]  (Normal) Collected:  09/09/20 0520    Lab Status:  Final result Updated:  09/09/20 0616     POC Glucose 85 mg/dl     POCT alcohol breath test [[id number]]  (Normal) Resulted:  09/09/20 0526    Lab Status:  Final result Updated: 09/09/20 0526     EXTBreath Alcohol 0 000    Rapid drug screen, urine [[id number]]     Lab Status:  No result Specimen:  Urine                  No orders to display              Procedures  Procedures         ED Course  ED Course as of Sep 09 0717   Wed Sep 09, 2020   0511 Patient is a 53-year-old male coming in today for heroin withdrawal and asking for assistance for rehab  On exam patient is hemodynamically stable no acute distress  He will give Ativan and Zofran to help prevent withdrawal   Breath alcohol, glucose, urine and referral host      Portions of the record may have been created with voice recognition software  Occasional wrong word or "sound a like" substitutions may have occurred due to the inherent limitations of voice recognition software  Read the chart carefully and recognize, using context, where substitutions have occurred  1299 BAT negative  0615 Glucose 85  Pending COVID and HOST evaluation      6173 Pending RUDS  S/o to Dr Jackeline Prince for follow up                                          MDM    Disposition  Final diagnoses:   Substance abuse ([facility] [address] )   Heroin abuse ([facility] [address] )     Time reflects when diagnosis was documented in both MDM as applicable and the Disposition within this note     Time User Action Codes Description Comment    9/9/2020  5:56 AM Anna Coffey Add [F19 10] Substance abuse ([facility] [address] )     9/9/2020  5:56 AM Anna Coffey Add [F11 10] Heroin abuse [facility])       ED Disposition     None      Follow-up Information    None         Patient's Medications   Discharge Prescriptions    No medications on file     No discharge procedures on file      PDMP Review     None          ED Provider  Electronically Signed by           Lord Aviav DO  09/09/20 7245

## 2020-09-09 NOTE — ED NOTES
This nurse went into room #10 to check on pt and found pt sleeping on floor; the patient states he is more comfortable on the floor and does not want to lay in the bed  Clinical coordinator made aware       Hal Hinkle RN  09/09/20 0161

## 2020-09-09 NOTE — ED NOTES
Pt given phone to talk w/ [facility] [phone] #[phone]          Hansel Barrera RN  09/09/20 7477

## 2020-09-09 NOTE — ED NOTES
Admissions director from [facility] attempted to speak to patient 3 times  He refused or fell asleep during to conversation    The last time, he requested to leave  Notified Cassi Harper at [facility] same

## 2020-09-09 NOTE — ED NOTES
Patient reports that he is not suicidal or homicidal and that he is not hearing or seeing things  He states that he wants detox - rehab - for a longer time this time than the last  He states he last used heroin about 2pm yesterday afternoon, along with snorting k2  He states that he injects 3-4 bundles each day       Alton San RN  09/09/20 9347

## 2021-06-11 ENCOUNTER — HOSPITAL ENCOUNTER (EMERGENCY)
Facility: HOSPITAL | Age: 51
Discharge: HOME | End: 2021-06-11
Attending: EMERGENCY MEDICINE | Admitting: EMERGENCY MEDICINE
Payer: COMMERCIAL

## 2021-06-11 VITALS
RESPIRATION RATE: 16 BRPM | OXYGEN SATURATION: 98 % | HEART RATE: 113 BPM | BODY MASS INDEX: 23.21 KG/M2 | WEIGHT: 139.5 LBS | DIASTOLIC BLOOD PRESSURE: 96 MMHG | TEMPERATURE: 97.6 F | SYSTOLIC BLOOD PRESSURE: 140 MMHG

## 2021-06-11 DIAGNOSIS — T07.XXXA ABRASIONS OF MULTIPLE SITES: ICD-10-CM

## 2021-06-11 DIAGNOSIS — F19.10 SUBSTANCE ABUSE (HCC): Primary | ICD-10-CM

## 2021-06-11 PROCEDURE — 90471 IMMUNIZATION ADMIN: CPT

## 2021-06-11 PROCEDURE — 90715 TDAP VACCINE 7 YRS/> IM: CPT | Performed by: EMERGENCY MEDICINE

## 2021-06-11 PROCEDURE — 99284 EMERGENCY DEPT VISIT MOD MDM: CPT

## 2021-06-11 PROCEDURE — 99282 EMERGENCY DEPT VISIT SF MDM: CPT | Performed by: EMERGENCY MEDICINE

## 2021-06-11 RX ORDER — ACETAMINOPHEN 325 MG/1
975 TABLET ORAL ONCE
Status: DISCONTINUED | OUTPATIENT
Start: 2021-06-11 | End: 2021-06-11 | Stop reason: HOSPADM

## 2021-06-11 RX ADMIN — TETANUS TOXOID, REDUCED DIPHTHERIA TOXOID AND ACELLULAR PERTUSSIS VACCINE, ADSORBED 0.5 ML: 5; 2.5; 8; 8; 2.5 SUSPENSION INTRAMUSCULAR at 20:29

## 2021-06-12 NOTE — ED NOTES
Multiple abrasions on bilateral knees and shins and left posterior wrist      Rio Barlow RN  06/11/21 2006

## 2021-06-12 NOTE — ED NOTES
Patient brought in with [employer] for evaluation  Patient appears to have consumed some type of drugs       Jaylen Geronimo RN  06/11/21 2002

## 2021-06-12 NOTE — ED NOTES
Per [employer] patient is released from there custody and may be discharged when medically clear and stable  Per Dr Bonnie Larkin patient will be okay for discharge when he wakes up a little more  Patient currently resting on litter       Edel Simons RN  06/11/21 2015

## 2021-06-12 NOTE — ED NOTES
Patient got into an argument with Dr Kavon Abdalla, Dr Kavon Abdalla attempted to give patient information about the rescue mission since he is homeless, but closed his eyes and would not talk to the doctor  Dr Kavon Abdalla offered to give patient Tylenol for his pain and he attempted to tell the patient why he was brought here in the first place, patient did not want to listen  Patient refused Tylenol, patient was given discharge instructions and threw them on the floor  Given his clothing that were locked up and left the department cursing  Again patient refused Tylenol ordered by Dr Kavon Barlow, RN  06/11/21 4152

## 2021-06-12 NOTE — ED PROVIDER NOTES
History  Chief Complaint   Patient presents with    Overdose - Accidental     Pt forced his way into a [employer] car after using K2, herion, and cocaine  Escorted under [employer] to ED for eval       Patient is a 53-year-old male with a significant history substance abuse who presents today via [facility]  Per police patient climbed into the police car and refused to get out  Patient had various abrasions on his extremities prior to entering the car  Patient admits to doing a little heroin Coke and K2   Patient denies any suicidal homicidal ideations  Has no complaints at this time  Patient was left here by a PD is not under arrest           Prior to Admission Medications   Prescriptions Last Dose Informant Patient Reported? Taking? FLUoxetine (PROzac) 20 mg capsule   No No   Sig: Take 1 capsule (20 mg total) by mouth daily At 9am   Patient not taking: Reported on 7/16/2020   albuterol (VENTOLIN HFA) 90 mcg/act inhaler   No No   Sig: Inhale 2 puffs every 6 (six) hours as needed for wheezing   Patient not taking: Reported on 7/16/2020   hydrOXYzine HCL (ATARAX) 25 mg tablet   No No   Sig: Take 1 tablet (25 mg total) by mouth every 6 (six) hours as needed (mild anxiety)   Patient not taking: Reported on 7/16/2020   risperiDONE (RisperDAL) 1 mg tablet   No No   Sig: Take 1 tablet (1 mg total) by mouth daily at bedtime   Patient not taking: Reported on 7/16/2020   traZODone (DESYREL) 100 mg tablet   No No   Sig: Take 1 tablet (100 mg total) by mouth daily at bedtime as needed for sleep   Patient not taking: Reported on 7/16/2020      Facility-Administered Medications: None       Past Medical History:   Diagnosis Date    Addiction [facility] )     Anxiety     Depression     [facility]     Hepatitis C virus     Psychiatric illness     Sleep apnea        Past Surgical History:   Procedure Laterality Date    CYST REMOVAL      ELBOW SURGERY         History reviewed  No pertinent family history    I have reviewed and agree with the history as documented  E-Cigarette/Vaping    E-Cigarette Use Never User      E-Cigarette/Vaping Substances    Nicotine No     THC No     CBD No     Flavoring No     Other No     Unknown No      Social History     Tobacco Use    Smoking status: Current Every Day Smoker     Packs/day: 1 00     Types: Cigarettes    Smokeless tobacco: Never Used   Substance Use Topics    Alcohol use: Not Currently     Frequency: Never     Binge frequency: Never    Drug use: Yes     Types: Heroin, Cocaine     Comment: iv heroin and cocaine, smokes k2 also, all daily       Review of Systems   Constitutional: Positive for activity change  HENT: Negative  Eyes: Negative  Respiratory: Negative  Cardiovascular: Negative  Gastrointestinal: Negative  Endocrine: Negative  Genitourinary: Negative  Musculoskeletal: Negative  Skin: Negative  Allergic/Immunologic: Negative  Neurological: Negative  Hematological: Negative  Psychiatric/Behavioral: Negative  All other systems reviewed and are negative  Physical Exam  Physical Exam  Vitals signs reviewed  Constitutional:       Appearance: Normal appearance  He is normal weight  HENT:      Head: Normocephalic and atraumatic  Comments: Patient without any swelling, tenderness to palpation, no septal hematoma, no hemotympanum, no orbital tenderness to palpation, no TMJ tenderness, no malocclusion  Right Ear: Tympanic membrane, ear canal and external ear normal       Left Ear: Tympanic membrane, ear canal and external ear normal       Nose: Nose normal       Mouth/Throat:      Mouth: Mucous membranes are moist       Pharynx: Oropharynx is clear  Eyes:      Conjunctiva/sclera: Conjunctivae normal       Pupils: Pupils are equal, round, and reactive to light  Neck:      Musculoskeletal: Normal range of motion  Cardiovascular:      Rate and Rhythm: Normal rate and regular rhythm        Heart sounds: Normal heart sounds  Pulmonary:      Effort: Pulmonary effort is normal       Breath sounds: Normal breath sounds  Abdominal:      General: Bowel sounds are normal       Palpations: Abdomen is soft  Musculoskeletal: Normal range of motion  General: Swelling present  No tenderness  Comments: No point bony tenderness and full range of motion   Skin:     General: Skin is warm and dry  Capillary Refill: Capillary refill takes less than 2 seconds  Comments: Patient with multiple abrasions on bilateral knees and the left forearm  Neurological:      General: No focal deficit present  Mental Status: He is alert and oriented to person, place, and time  Comments: Delayed but appropriate with his answers         Vital Signs  ED Triage Vitals [06/11/21 2000]   Temperature Pulse Respirations Blood Pressure SpO2   97 6 °F (36 4 °C) (!) 113 16 140/96 98 %      Temp Source Heart Rate Source Patient Position - Orthostatic VS BP Location FiO2 (%)   Tympanic Monitor Lying Left arm --      Pain Score       --           Vitals:    06/11/21 2000   BP: 140/96   Pulse: (!) 113   Patient Position - Orthostatic VS: Lying         Visual Acuity      ED Medications  Medications   acetaminophen (TYLENOL) tablet 975 mg (has no administration in time range)   tetanus-diphtheria-acellular pertussis (BOOSTRIX) IM injection 0 5 mL (0 5 mL Intramuscular Given 6/11/21 2029)       Diagnostic Studies  Results Reviewed     None                 No orders to display              Procedures  Procedures         ED Course  ED Course as of Jun 11 2137 Fri Jun 11, 2021 2133 Patient awake alert oriented at this time  Explained that we he will be discharged at this point  Patient upset because he has pain from the abrasions on his legs    Explained that happy to give him some Tylenol but still be discharged at that point patient came increasingly agitated stating that he wants my name and that he is going to report me to his  because of the way I am treating him  Asked if patient had a place to stay stated that he is homeless and then refused to answer any further questions me patient forcibly close his eyes and turn his head away from me would not answer any questions about the rescue mission when I tried to offer some assistance  Again asked patient several times that there is anything else I could do for him patient then said "No  I am done with you "                                SBIRT 20yo+      Most Recent Value   SBIRT (22 yo +)   In order to provide better care to our patients, we are screening all of our patients for alcohol and drug use  Would it be okay to ask you these screening questions? Yes Filed at: 06/11/2021 2007   Initial Alcohol Screen: US AUDIT-C    1  How often do you have a drink containing alcohol?  0 Filed at: 06/11/2021 2007   2  How many drinks containing alcohol do you have on a typical day you are drinking? 0 Filed at: 06/11/2021 2007   3a  Male UNDER 65: How often do you have five or more drinks on one occasion? 0 Filed at: 06/11/2021 2007   3b  FEMALE Any Age, or MALE 65+: How often do you have 4 or more drinks on one occassion? 0 Filed at: 06/11/2021 2007   Audit-C Score  0 Filed at: 06/11/2021 2007   KATIE: How many times in the past year have you    Used an illegal drug or used a prescription medication for non-medical reasons?   Never Filed at: 06/11/2021 2007                    [facility]  Number of Diagnoses or Management Options  Abrasions of multiple sites:   Substance abuse [facility]):      Amount and/or Complexity of Data Reviewed  Obtain history from someone other than the patient: yes  Review and summarize past medical records: yes        Disposition  Final diagnoses:   Substance abuse ([address] )   Abrasions of multiple sites     Time reflects when diagnosis was documented in both MDM as applicable and the Disposition within this note     Time User Action Codes Description Comment 6/11/2021  8:17 PM Vaishali Billingsley Add [F19 10] Substance abuse ([facility] )     6/11/2021  8:17 PM Vaishali Billingsley Add [T07  XXXA] Abrasions of multiple sites       ED Disposition     ED Disposition Condition Date/Time Comment    Discharge Stable Fri Jun 11, 2021  9:34 PM Amilcar Wade discharge to home/self care  Follow-up Information     Follow up With Specialties Details Why Lloyd Webb MD Family Medicine   [address]      Faviola Mcconnell MD Family Medicine   [address]  [phone]            Patient's Medications   Discharge Prescriptions    No medications on file     No discharge procedures on file      PDMP Review     None          ED Provider  Electronically Signed by           Parker Decker MD  06/11/21 5443       Parker Decker MD  06/11/21 2104

## 2021-09-24 ENCOUNTER — HOSPITAL ENCOUNTER (EMERGENCY)
Facility: HOSPITAL | Age: 51
Discharge: HOME | End: 2021-09-24
Attending: EMERGENCY MEDICINE
Payer: COMMERCIAL

## 2021-09-24 VITALS
RESPIRATION RATE: 18 BRPM | DIASTOLIC BLOOD PRESSURE: 61 MMHG | OXYGEN SATURATION: 99 % | WEIGHT: 138 LBS | BODY MASS INDEX: 22.96 KG/M2 | TEMPERATURE: 98.6 F | SYSTOLIC BLOOD PRESSURE: 93 MMHG | HEART RATE: 83 BPM

## 2021-09-24 DIAGNOSIS — L02.11 NECK ABSCESS: Primary | ICD-10-CM

## 2021-09-24 PROCEDURE — 87205 SMEAR GRAM STAIN: CPT | Performed by: EMERGENCY MEDICINE

## 2021-09-24 PROCEDURE — 99283 EMERGENCY DEPT VISIT LOW MDM: CPT

## 2021-09-24 PROCEDURE — 10061 I&D ABSCESS COMP/MULTIPLE: CPT | Performed by: EMERGENCY MEDICINE

## 2021-09-24 PROCEDURE — 99284 EMERGENCY DEPT VISIT MOD MDM: CPT | Performed by: EMERGENCY MEDICINE

## 2021-09-24 PROCEDURE — 87070 CULTURE OTHR SPECIMN AEROBIC: CPT | Performed by: EMERGENCY MEDICINE

## 2021-09-24 PROCEDURE — 87186 SC STD MICRODIL/AGAR DIL: CPT | Performed by: EMERGENCY MEDICINE

## 2021-09-24 RX ORDER — SULFAMETHOXAZOLE AND TRIMETHOPRIM 800; 160 MG/1; MG/1
1 TABLET ORAL 2 TIMES DAILY
Qty: 20 TABLET | Refills: 0 | Status: SHIPPED | OUTPATIENT
Start: 2021-09-24 | End: 2021-10-04

## 2021-09-24 RX ORDER — LIDOCAINE HYDROCHLORIDE AND EPINEPHRINE 10; 10 MG/ML; UG/ML
20 INJECTION, SOLUTION INFILTRATION; PERINEURAL ONCE
Status: COMPLETED | OUTPATIENT
Start: 2021-09-24 | End: 2021-09-24

## 2021-09-24 RX ADMIN — LIDOCAINE HYDROCHLORIDE,EPINEPHRINE BITARTRATE 20 ML: 10; .01 INJECTION, SOLUTION INFILTRATION; PERINEURAL at 12:50

## 2021-09-27 LAB
BACTERIA WND AEROBE CULT: ABNORMAL
BACTERIA WND AEROBE CULT: ABNORMAL
GRAM STN SPEC: ABNORMAL
GRAM STN SPEC: ABNORMAL

## 2023-10-01 ENCOUNTER — HOSPITAL ENCOUNTER (INPATIENT)
Facility: HOSPITAL | Age: 53
LOS: 1 days | End: 2023-10-02
Attending: EMERGENCY MEDICINE | Admitting: FAMILY MEDICINE
Payer: COMMERCIAL

## 2023-10-01 ENCOUNTER — APPOINTMENT (INPATIENT)
Dept: CT IMAGING | Facility: HOSPITAL | Age: 53
End: 2023-10-01
Payer: COMMERCIAL

## 2023-10-01 ENCOUNTER — APPOINTMENT (EMERGENCY)
Dept: RADIOLOGY | Facility: HOSPITAL | Age: 53
End: 2023-10-01
Payer: COMMERCIAL

## 2023-10-01 DIAGNOSIS — S81.801A MULTIPLE OPEN WOUNDS OF RIGHT LOWER LEG: ICD-10-CM

## 2023-10-01 DIAGNOSIS — L08.9 WOUND INFECTION: Primary | ICD-10-CM

## 2023-10-01 DIAGNOSIS — T14.8XXA WOUND INFECTION: Primary | ICD-10-CM

## 2023-10-01 DIAGNOSIS — S81.802A MULTIPLE OPEN WOUNDS OF LOWER LEG, LEFT, INITIAL ENCOUNTER: ICD-10-CM

## 2023-10-01 DIAGNOSIS — F19.10 SUBSTANCE ABUSE (HCC): Chronic | ICD-10-CM

## 2023-10-01 PROBLEM — F11.20 FENTANYL USE DISORDER, SEVERE, DEPENDENCE (HCC): Status: ACTIVE | Noted: 2023-10-01

## 2023-10-01 PROBLEM — T07.XXXA MULTIPLE WOUNDS: Status: ACTIVE | Noted: 2023-10-01

## 2023-10-01 PROBLEM — S81.801D OPEN LEG WOUND, RIGHT, SUBSEQUENT ENCOUNTER: Status: ACTIVE | Noted: 2023-10-01

## 2023-10-01 LAB
ALBUMIN SERPL BCP-MCNC: 3.5 G/DL (ref 3.5–5)
ALP SERPL-CCNC: 75 U/L (ref 34–104)
ALT SERPL W P-5'-P-CCNC: 48 U/L (ref 7–52)
ANION GAP SERPL CALCULATED.3IONS-SCNC: 4 MMOL/L
APTT PPP: 28 SECONDS (ref 23–37)
AST SERPL W P-5'-P-CCNC: 39 U/L (ref 13–39)
BASOPHILS # BLD AUTO: 0.03 THOUSANDS/ÂΜL (ref 0–0.1)
BASOPHILS NFR BLD AUTO: 0 % (ref 0–1)
BILIRUB SERPL-MCNC: 0.41 MG/DL (ref 0.2–1)
BUN SERPL-MCNC: 16 MG/DL (ref 5–25)
CALCIUM SERPL-MCNC: 9 MG/DL (ref 8.4–10.2)
CHLORIDE SERPL-SCNC: 104 MMOL/L (ref 96–108)
CO2 SERPL-SCNC: 31 MMOL/L (ref 21–32)
CREAT SERPL-MCNC: 0.93 MG/DL (ref 0.6–1.3)
EOSINOPHIL # BLD AUTO: 0.12 THOUSAND/ÂΜL (ref 0–0.61)
EOSINOPHIL NFR BLD AUTO: 2 % (ref 0–6)
ERYTHROCYTE [DISTWIDTH] IN BLOOD BY AUTOMATED COUNT: 12.7 % (ref 11.6–15.1)
GFR SERPL CREATININE-BSD FRML MDRD: 94 ML/MIN/1.73SQ M
GLUCOSE SERPL-MCNC: 80 MG/DL (ref 65–140)
HCT VFR BLD AUTO: 45.2 % (ref 36.5–49.3)
HGB BLD-MCNC: 14.6 G/DL (ref 12–17)
IMM GRANULOCYTES # BLD AUTO: 0.01 THOUSAND/UL (ref 0–0.2)
IMM GRANULOCYTES NFR BLD AUTO: 0 % (ref 0–2)
INR PPP: 1.05 (ref 0.84–1.19)
LACTATE SERPL-SCNC: 1.3 MMOL/L (ref 0.5–2)
LYMPHOCYTES # BLD AUTO: 2.03 THOUSANDS/ÂΜL (ref 0.6–4.47)
LYMPHOCYTES NFR BLD AUTO: 30 % (ref 14–44)
MCH RBC QN AUTO: 31.9 PG (ref 26.8–34.3)
MCHC RBC AUTO-ENTMCNC: 32.3 G/DL (ref 31.4–37.4)
MCV RBC AUTO: 99 FL (ref 82–98)
MONOCYTES # BLD AUTO: 0.59 THOUSAND/ÂΜL (ref 0.17–1.22)
MONOCYTES NFR BLD AUTO: 9 % (ref 4–12)
NEUTROPHILS # BLD AUTO: 4.07 THOUSANDS/ÂΜL (ref 1.85–7.62)
NEUTS SEG NFR BLD AUTO: 59 % (ref 43–75)
NRBC BLD AUTO-RTO: 0 /100 WBCS
PLATELET # BLD AUTO: 224 THOUSANDS/UL (ref 149–390)
PMV BLD AUTO: 9.6 FL (ref 8.9–12.7)
POTASSIUM SERPL-SCNC: 3.9 MMOL/L (ref 3.5–5.3)
PROCALCITONIN SERPL-MCNC: 0.1 NG/ML
PROT SERPL-MCNC: 9.2 G/DL (ref 6.4–8.4)
PROTHROMBIN TIME: 13.8 SECONDS (ref 11.6–14.5)
RBC # BLD AUTO: 4.58 MILLION/UL (ref 3.88–5.62)
SODIUM SERPL-SCNC: 139 MMOL/L (ref 135–147)
WBC # BLD AUTO: 6.85 THOUSAND/UL (ref 4.31–10.16)

## 2023-10-01 PROCEDURE — 85025 COMPLETE CBC W/AUTO DIFF WBC: CPT | Performed by: EMERGENCY MEDICINE

## 2023-10-01 PROCEDURE — 99284 EMERGENCY DEPT VISIT MOD MDM: CPT

## 2023-10-01 PROCEDURE — 80053 COMPREHEN METABOLIC PANEL: CPT | Performed by: EMERGENCY MEDICINE

## 2023-10-01 PROCEDURE — 87040 BLOOD CULTURE FOR BACTERIA: CPT | Performed by: EMERGENCY MEDICINE

## 2023-10-01 PROCEDURE — 73590 X-RAY EXAM OF LOWER LEG: CPT

## 2023-10-01 PROCEDURE — 99223 1ST HOSP IP/OBS HIGH 75: CPT | Performed by: FAMILY MEDICINE

## 2023-10-01 PROCEDURE — G1004 CDSM NDSC: HCPCS

## 2023-10-01 PROCEDURE — 96365 THER/PROPH/DIAG IV INF INIT: CPT

## 2023-10-01 PROCEDURE — 85610 PROTHROMBIN TIME: CPT | Performed by: EMERGENCY MEDICINE

## 2023-10-01 PROCEDURE — 84145 PROCALCITONIN (PCT): CPT | Performed by: EMERGENCY MEDICINE

## 2023-10-01 PROCEDURE — 36415 COLL VENOUS BLD VENIPUNCTURE: CPT | Performed by: EMERGENCY MEDICINE

## 2023-10-01 PROCEDURE — 93005 ELECTROCARDIOGRAM TRACING: CPT

## 2023-10-01 PROCEDURE — 83605 ASSAY OF LACTIC ACID: CPT | Performed by: EMERGENCY MEDICINE

## 2023-10-01 PROCEDURE — 99285 EMERGENCY DEPT VISIT HI MDM: CPT | Performed by: EMERGENCY MEDICINE

## 2023-10-01 PROCEDURE — 73701 CT LOWER EXTREMITY W/DYE: CPT

## 2023-10-01 PROCEDURE — 85730 THROMBOPLASTIN TIME PARTIAL: CPT | Performed by: EMERGENCY MEDICINE

## 2023-10-01 RX ORDER — LORAZEPAM 2 MG/ML
1 INJECTION INTRAMUSCULAR EVERY 4 HOURS PRN
Status: DISCONTINUED | OUTPATIENT
Start: 2023-10-01 | End: 2023-10-02 | Stop reason: HOSPADM

## 2023-10-01 RX ORDER — ALBUTEROL SULFATE 90 UG/1
2 AEROSOL, METERED RESPIRATORY (INHALATION) EVERY 6 HOURS PRN
Status: DISCONTINUED | OUTPATIENT
Start: 2023-10-01 | End: 2023-10-02 | Stop reason: HOSPADM

## 2023-10-01 RX ORDER — HYDROXYZINE HYDROCHLORIDE 25 MG/1
25 TABLET, FILM COATED ORAL EVERY 6 HOURS PRN
Status: DISCONTINUED | OUTPATIENT
Start: 2023-10-01 | End: 2023-10-02 | Stop reason: HOSPADM

## 2023-10-01 RX ORDER — VANCOMYCIN HYDROCHLORIDE 1 G/200ML
15 INJECTION, SOLUTION INTRAVENOUS EVERY 12 HOURS
Status: DISCONTINUED | OUTPATIENT
Start: 2023-10-01 | End: 2023-10-01

## 2023-10-01 RX ORDER — TRAZODONE HYDROCHLORIDE 100 MG/1
100 TABLET ORAL
Status: DISCONTINUED | OUTPATIENT
Start: 2023-10-01 | End: 2023-10-02 | Stop reason: HOSPADM

## 2023-10-01 RX ORDER — VANCOMYCIN HYDROCHLORIDE 1 G/200ML
15 INJECTION, SOLUTION INTRAVENOUS ONCE
Status: COMPLETED | OUTPATIENT
Start: 2023-10-01 | End: 2023-10-01

## 2023-10-01 RX ORDER — ONDANSETRON 2 MG/ML
4 INJECTION INTRAMUSCULAR; INTRAVENOUS EVERY 6 HOURS PRN
Status: DISCONTINUED | OUTPATIENT
Start: 2023-10-01 | End: 2023-10-02 | Stop reason: HOSPADM

## 2023-10-01 RX ORDER — METRONIDAZOLE 500 MG/1
500 TABLET ORAL EVERY 8 HOURS SCHEDULED
Status: DISCONTINUED | OUTPATIENT
Start: 2023-10-01 | End: 2023-10-02

## 2023-10-01 RX ORDER — NICOTINE 21 MG/24HR
1 PATCH, TRANSDERMAL 24 HOURS TRANSDERMAL DAILY
Status: DISCONTINUED | OUTPATIENT
Start: 2023-10-02 | End: 2023-10-02 | Stop reason: HOSPADM

## 2023-10-01 RX ORDER — VANCOMYCIN HYDROCHLORIDE 1 G/200ML
15 INJECTION, SOLUTION INTRAVENOUS EVERY 12 HOURS
Status: DISCONTINUED | OUTPATIENT
Start: 2023-10-02 | End: 2023-10-02

## 2023-10-01 RX ORDER — ENOXAPARIN SODIUM 100 MG/ML
40 INJECTION SUBCUTANEOUS DAILY
Status: DISCONTINUED | OUTPATIENT
Start: 2023-10-02 | End: 2023-10-02 | Stop reason: HOSPADM

## 2023-10-01 RX ORDER — CEFEPIME HYDROCHLORIDE 2 G/50ML
2000 INJECTION, SOLUTION INTRAVENOUS EVERY 12 HOURS
Status: DISCONTINUED | OUTPATIENT
Start: 2023-10-01 | End: 2023-10-02

## 2023-10-01 RX ADMIN — IOHEXOL 100 ML: 350 INJECTION, SOLUTION INTRAVENOUS at 20:58

## 2023-10-01 RX ADMIN — METRONIDAZOLE 500 MG: 500 TABLET ORAL at 20:38

## 2023-10-01 RX ADMIN — CEFEPIME HYDROCHLORIDE 2000 MG: 2 INJECTION, SOLUTION INTRAVENOUS at 20:10

## 2023-10-01 RX ADMIN — VANCOMYCIN HYDROCHLORIDE 1000 MG: 1 INJECTION, SOLUTION INTRAVENOUS at 18:49

## 2023-10-01 NOTE — ED PROVIDER NOTES
History  Chief Complaint   Patient presents with   • Abscess - Complicated     Large abscess with surrounding redness to right lateral leg, admits to injection heroin. Last used today at 1130. MRSA hx     HPI    Prior to Admission Medications   Prescriptions Last Dose Informant Patient Reported? Taking? FLUoxetine (PROzac) 20 mg capsule   No No   Sig: Take 1 capsule (20 mg total) by mouth daily At 9am   Patient not taking: Reported on 7/16/2020   albuterol (VENTOLIN HFA) 90 mcg/act inhaler   No No   Sig: Inhale 2 puffs every 6 (six) hours as needed for wheezing   Patient not taking: Reported on 7/16/2020   hydrOXYzine HCL (ATARAX) 25 mg tablet   No No   Sig: Take 1 tablet (25 mg total) by mouth every 6 (six) hours as needed (mild anxiety)   Patient not taking: Reported on 7/16/2020   risperiDONE (RisperDAL) 1 mg tablet   No No   Sig: Take 1 tablet (1 mg total) by mouth daily at bedtime   Patient not taking: Reported on 7/16/2020   traZODone (DESYREL) 100 mg tablet   No No   Sig: Take 1 tablet (100 mg total) by mouth daily at bedtime as needed for sleep   Patient not taking: Reported on 7/16/2020      Facility-Administered Medications: None       Past Medical History:   Diagnosis Date   • Addiction to drug [facility])    • Anxiety    • Depression    • Drug abuse ([address] [facility])    • Hepatitis C virus    • Psychiatric illness    • Sleep apnea        Past Surgical History:   Procedure Laterality Date   • CYST REMOVAL     • ELBOW SURGERY         History reviewed. No pertinent family history. I have reviewed and agree with the history as documented.     E-Cigarette/Vaping   • E-Cigarette Use Never User      E-Cigarette/Vaping Substances   • Nicotine No    • THC No    • CBD No    • Flavoring No    • Other No    • Unknown No      Social History     Tobacco Use   • Smoking status: Every Day     Packs/day: 1.00     Types: Cigarettes   • Smokeless tobacco: Never   Vaping Use   • Vaping Use: Never used   Substance Use Topics   • Alcohol use: Not Currently   • Drug use: Yes     Types: Heroin, Cocaine, Marijuana     Comment: iv heroin and cocaine, smokes k2 also, all daily       Review of Systems   Constitutional: Negative for fever. Respiratory: Negative for shortness of breath. Cardiovascular: Negative for chest pain. Gastrointestinal: Negative for abdominal pain, nausea and vomiting. Skin: Positive for wound. All other systems reviewed and are negative. Physical Exam  Physical Exam  Vitals and nursing note reviewed. Constitutional:       General: He is awake. He is not in acute distress. Appearance: He is not toxic-appearing. HENT:      Head: Normocephalic and atraumatic. Eyes:      General: Vision grossly intact. Gaze aligned appropriately. Cardiovascular:      Rate and Rhythm: Normal rate and regular rhythm. Heart sounds: Normal heart sounds. Pulmonary:      Effort: Pulmonary effort is normal. No respiratory distress. Breath sounds: Normal breath sounds. Abdominal:      Palpations: Abdomen is soft. Tenderness: There is no abdominal tenderness. Musculoskeletal:      Cervical back: Full passive range of motion without pain and neck supple. Skin:     General: Skin is warm and dry. Comments: Necrotic wounds on the lateral aspects of bilateral lower legs. Surrounding erythema and tenderness to palpation, underlying induration. No crepitus. No active drainage. Neurological:      General: No focal deficit present. Mental Status: He is alert and oriented to person, place, and time.                     Vital Signs  ED Triage Vitals   Temperature Pulse Respirations Blood Pressure SpO2   10/01/23 1656 10/01/23 1656 10/01/23 1656 10/01/23 1656 10/01/23 1656   100.4 °F (38 °C) 99 20 146/85 98 %      Temp Source Heart Rate Source Patient Position - Orthostatic VS BP Location FiO2 (%)   10/01/23 1656 10/01/23 1656 10/01/23 1853 10/01/23 1853 --   Oral Monitor Sitting Right arm       Pain Score 10/01/23 1656       9           Vitals:    10/01/23 1656 10/01/23 1812 10/01/23 1853   BP: 146/85  121/71   Pulse: 99 69 63   Patient Position - Orthostatic VS:   Sitting         Visual Acuity      ED Medications  Medications   vancomycin (VANCOCIN) IVPB (premix in dextrose) 1,000 mg 200 mL (1,000 mg Intravenous New Bag 10/1/23 1849)       Diagnostic Studies  Results Reviewed     Procedure Component Value Units Date/Time    Procalcitonin [[id number]]  (Normal) Collected: 10/01/23 1745    Lab Status: Final result Specimen: Blood from Arm, Left Updated: 10/01/23 1831     Procalcitonin 0.10 ng/ml     Lactic acid [[id number]]  (Normal) Collected: 10/01/23 1745    Lab Status: Final result Specimen: Blood from Arm, Left Updated: 10/01/23 1823     LACTIC ACID 1.3 mmol/L     Narrative:      Result may be elevated if tourniquet was used during collection.     Comprehensive metabolic panel [[id number]]  (Abnormal) Collected: 10/01/23 1745    Lab Status: Final result Specimen: Blood from Arm, Left Updated: 10/01/23 1822     Sodium 139 mmol/L      Potassium 3.9 mmol/L      Chloride 104 mmol/L      CO2 31 mmol/L      ANION GAP 4 mmol/L      BUN 16 mg/dL      Creatinine 0.93 mg/dL      Glucose 80 mg/dL      Calcium 9.0 mg/dL      AST 39 U/L      ALT 48 U/L      Alkaline Phosphatase 75 U/L      Total Protein 9.2 g/dL      Albumin 3.5 g/dL      Total Bilirubin 0.41 mg/dL      eGFR 94 ml/min/1.73sq m     Narrative:      [place] guidelines for Chronic Kidney Disease (CKD):   •  Stage 1 with normal or high GFR (GFR > 90 mL/min/1.73 square meters)  •  Stage 2 Mild CKD (GFR = 60-89 mL/min/1.73 square meters)  •  Stage 3A Moderate CKD (GFR = 45-59 mL/min/1.73 square meters)  •  Stage 3B Moderate CKD (GFR = 30-44 mL/min/1.73 square meters)  •  Stage 4 Severe CKD (GFR = 15-29 mL/min/1.73 square meters)  •  Stage 5 End Stage CKD (GFR <15 mL/min/1.73 square meters)  Note: GFR calculation is accurate only with a steady state creatinine    Protime-INR [[id number]]  (Normal) Collected: 10/01/23 1745    Lab Status: Final result Specimen: Blood from Arm, Left Updated: 10/01/23 1814     Protime 13.8 seconds      INR 1.05    APTT [[id number]]  (Normal) Collected: 10/01/23 1745    Lab Status: Final result Specimen: Blood from Arm, Left Updated: 10/01/23 1814     PTT 28 seconds     CBC and differential [[id number]]  (Abnormal) Collected: 10/01/23 1745    Lab Status: Final result Specimen: Blood from Arm, Left Updated: 10/01/23 1804     WBC 6.85 Thousand/uL      RBC 4.58 Million/uL      Hemoglobin 14.6 g/dL      Hematocrit 45.2 %      MCV 99 fL      MCH 31.9 pg      MCHC 32.3 g/dL      RDW 12.7 %      MPV 9.6 fL      Platelets 772 Thousands/uL      nRBC 0 /100 WBCs      Neutrophils Relative 59 %      Immat GRANS % 0 %      Lymphocytes Relative 30 %      Monocytes Relative 9 %      Eosinophils Relative 2 %      Basophils Relative 0 %      Neutrophils Absolute 4.07 Thousands/µL      Immature Grans Absolute 0.01 Thousand/uL      Lymphocytes Absolute 2.03 Thousands/µL      Monocytes Absolute 0.59 Thousand/µL      Eosinophils Absolute 0.12 Thousand/µL      Basophils Absolute 0.03 Thousands/µL     Blood culture #2 [[id number]] Collected: 10/01/23 1745    Lab Status: In process Specimen: Blood from Arm, Left Updated: 10/01/23 1802    Blood culture #1 [[id number]] Collected: 10/01/23 1750    Lab Status:  In process Specimen: Blood from Arm, Right Updated: 10/01/23 1801                 XR tibia fibula 2 views RIGHT   ED Interpretation by Ludmila Malik DO (10/01 1836)   No foreign bodies, subcutaneous emphysema, or acute osseous abnormalities      XR tibia fibula 2 views LEFT   ED Interpretation by Ludmila Malik DO (10/01 1836)   No foreign bodies, subcutaneous emphysema, or acute osseous abnormalities                 Procedures  Procedures         ED Course  ED Course as of 10/01/23 1911   Sun Oct 01, 2023   1747 Temperature: 100.4 °F (38 °C)   1747 Pulse: 99            Clinical Opiate Withdrawal Scale     Row Name 10/01/23 1812                Pulse 69  -CO        Resting Pulse Rate: Measured After Patient is Sitting or Lying for One Minute 0  -CO        GI Upset: Over Last Half Hour 0  -CO        Sweating: Over Past Half Hour Not Accounted for by Room Temperature of Patient Activity 1  -CO        Tremor: Observation of Outstretched Hands 1  -CO        Restlessness: Observation During Assessment 0  -CO        Yawning: Observation During Assessment 0  -CO        Pupil Size 0  -CO        Anxiety and Irritability 0  -CO        Bone or Joint Aches: If Patient was Having Pain Previously, Only the Additional Component Attributed to Opiate Withdrawal is Scored 0  -CO        Gooseflesh Skin 0  -CO        Runny Nose or Tearing: Not Accounted for by Cold Symptoms or Allergies 0  -CO        Clinical Opiate Withdrawal Scale Total Score 2  -CO        Heart Rate Source --        Patient Position - Orthostatic VS --              User Key  (r) = Recorded By, (t) = Taken By, (c) = Cosigned By    Initials Name    [address] [place] [address], RN                                SBIRT 22yo+    Flowsheet Row Most Recent Value   KATIE: How many times in the past year have you. .. Used an illegal drug or used a prescription medication for non-medical reasons?  Daily or Almost Daily Filed at: 10/01/2023 1659                    MDM    Disposition  Final diagnoses:   Multiple open wounds of lower leg, left, initial encounter   Multiple open wounds of right lower leg   Wound infection     Time reflects when diagnosis was documented in both MDM as applicable and the Disposition within this note     Time User Action Codes Description Comment    10/1/2023  4:56 PM Dwaine Birmingham Add [F19.10] Substance abuse ([address])     10/1/2023  6:50 PM Dao Adan Add [S81.802A] Multiple open wounds of lower leg, left, initial encounter     10/1/2023  6:50 PM Dao Adan Add [H89.179K] Multiple open wounds of right lower leg     10/1/2023  7:10 PM Darcy Gnosticist Add Vertell Jakob. 8XXA,  L08.9] Wound infection     10/1/2023  7:10 PM Darcy Gnosticist Modify [F19.10] Substance abuse ([address])     10/1/2023  7:10 PM Cely Schacandy. 8XXA,  L08.9] Wound infection       ED Disposition     ED Disposition   Admit    Condition   Stable    Date/Time   Sun Oct 1, 2023  7:10 PM    Comment   Case was discussed with TANA and the patient's admission status was agreed to be Admission Status: inpatient status to the service of Dr. Stephen Bal. Follow-up Information    None         Patient's Medications   Discharge Prescriptions    No medications on file       No discharge procedures on file.     PDMP Review     None          ED Provider  Electronically Signed by Yarely Bui. 8XXA,  L08.9] Wound infection     10/1/2023  7:10 PM Erving Newer Modify [F19.10] Substance abuse ([address])     10/1/2023  7:10 PM Kacie Lobato. 8XXA,  L08.9] Wound infection           ED Disposition       ED Disposition   Admit    Condition   Stable    Date/Time   Sun Oct 1, 2023  7:10 PM    Comment   Case was discussed with TANA and the patient's admission status was agreed to be Admission Status: inpatient status to the service of Dr. Vinny Teresa. Follow-up Information    None         Patient's Medications   Discharge Prescriptions    No medications on file       No discharge procedures on file.     PDMP Review       None            ED Provider  Electronically Signed by           Marcel Torres DO  10/16/23 1122

## 2023-10-01 NOTE — ASSESSMENT & PLAN NOTE
· Patient reports use of 10 bags of fentanyl every 4 hours, last use was 10/1/2023 at 11:30 AM  · He is concerned for impending withdrawal  · Patient states he is interested in stopping drug use  · We will request toxicology consult for guidance with impending withdrawal treatment

## 2023-10-02 ENCOUNTER — HOSPITAL ENCOUNTER (INPATIENT)
Facility: HOSPITAL | Age: 53
LOS: 3 days | Discharge: HOME | DRG: 773 | End: 2023-10-05
Attending: EMERGENCY MEDICINE | Admitting: EMERGENCY MEDICINE
Payer: COMMERCIAL

## 2023-10-02 VITALS
HEIGHT: 65 IN | OXYGEN SATURATION: 96 % | HEART RATE: 55 BPM | RESPIRATION RATE: 18 BRPM | BODY MASS INDEX: 26.7 KG/M2 | SYSTOLIC BLOOD PRESSURE: 119 MMHG | TEMPERATURE: 98.1 F | WEIGHT: 160.27 LBS | DIASTOLIC BLOOD PRESSURE: 79 MMHG

## 2023-10-02 DIAGNOSIS — S41.109A: ICD-10-CM

## 2023-10-02 DIAGNOSIS — F11.20 OPIOID USE DISORDER, SEVERE, DEPENDENCE (HCC): Primary | ICD-10-CM

## 2023-10-02 DIAGNOSIS — R76.8 HEPATITIS C ANTIBODY POSITIVE IN BLOOD: ICD-10-CM

## 2023-10-02 DIAGNOSIS — S81.809A: ICD-10-CM

## 2023-10-02 PROBLEM — F17.200 TOBACCO DEPENDENCY: Status: ACTIVE | Noted: 2023-10-02

## 2023-10-02 PROBLEM — F11.10 HEROIN ABUSE (HCC): Status: ACTIVE | Noted: 2023-10-01

## 2023-10-02 PROBLEM — F19.20 POLYSUBSTANCE DEPENDENCE (HCC): Status: ACTIVE | Noted: 2023-10-02

## 2023-10-02 LAB
ALBUMIN SERPL BCP-MCNC: 3 G/DL (ref 3.5–5)
ALP SERPL-CCNC: 64 U/L (ref 34–104)
ALT SERPL W P-5'-P-CCNC: 39 U/L (ref 7–52)
ANION GAP SERPL CALCULATED.3IONS-SCNC: 4 MMOL/L
AST SERPL W P-5'-P-CCNC: 34 U/L (ref 13–39)
ATRIAL RATE: 68 BPM
BASOPHILS # BLD MANUAL: 0 THOUSAND/UL (ref 0–0.1)
BASOPHILS NFR MAR MANUAL: 0 % (ref 0–1)
BILIRUB SERPL-MCNC: 0.38 MG/DL (ref 0.2–1)
BUN SERPL-MCNC: 14 MG/DL (ref 5–25)
CALCIUM ALBUM COR SERPL-MCNC: 9.1 MG/DL (ref 8.3–10.1)
CALCIUM SERPL-MCNC: 8.3 MG/DL (ref 8.4–10.2)
CHLORIDE SERPL-SCNC: 103 MMOL/L (ref 96–108)
CO2 SERPL-SCNC: 28 MMOL/L (ref 21–32)
CREAT SERPL-MCNC: 0.87 MG/DL (ref 0.6–1.3)
EOSINOPHIL # BLD MANUAL: 0.34 THOUSAND/UL (ref 0–0.4)
EOSINOPHIL NFR BLD MANUAL: 6 % (ref 0–6)
ERYTHROCYTE [DISTWIDTH] IN BLOOD BY AUTOMATED COUNT: 12.9 % (ref 11.6–15.1)
GFR SERPL CREATININE-BSD FRML MDRD: 99 ML/MIN/1.73SQ M
GLUCOSE SERPL-MCNC: 99 MG/DL (ref 65–140)
HCT VFR BLD AUTO: 42.1 % (ref 36.5–49.3)
HGB BLD-MCNC: 13.4 G/DL (ref 12–17)
LYMPHOCYTES # BLD AUTO: 2.06 THOUSAND/UL (ref 0.6–4.47)
LYMPHOCYTES # BLD AUTO: 36 % (ref 14–44)
MCH RBC QN AUTO: 31.3 PG (ref 26.8–34.3)
MCHC RBC AUTO-ENTMCNC: 31.8 G/DL (ref 31.4–37.4)
MCV RBC AUTO: 98 FL (ref 82–98)
MONOCYTES # BLD AUTO: 0.57 THOUSAND/UL (ref 0–1.22)
MONOCYTES NFR BLD: 10 % (ref 4–12)
NEUTROPHILS # BLD MANUAL: 2.75 THOUSAND/UL (ref 1.85–7.62)
NEUTS SEG NFR BLD AUTO: 48 % (ref 43–75)
P AXIS: 65 DEGREES
PLATELET # BLD AUTO: 189 THOUSANDS/UL (ref 149–390)
PLATELET BLD QL SMEAR: ADEQUATE
PMV BLD AUTO: 9.3 FL (ref 8.9–12.7)
POTASSIUM SERPL-SCNC: 4.3 MMOL/L (ref 3.5–5.3)
PR INTERVAL: 130 MS
PROT SERPL-MCNC: 7.9 G/DL (ref 6.4–8.4)
QRS AXIS: 74 DEGREES
QRSD INTERVAL: 86 MS
QT INTERVAL: 422 MS
QTC INTERVAL: 448 MS
RBC # BLD AUTO: 4.28 MILLION/UL (ref 3.88–5.62)
RBC MORPH BLD: NORMAL
SODIUM SERPL-SCNC: 135 MMOL/L (ref 135–147)
T WAVE AXIS: 81 DEGREES
VENTRICULAR RATE: 68 BPM
WBC # BLD AUTO: 5.73 THOUSAND/UL (ref 4.31–10.16)

## 2023-10-02 PROCEDURE — 93010 ELECTROCARDIOGRAM REPORT: CPT | Performed by: INTERNAL MEDICINE

## 2023-10-02 PROCEDURE — 85027 COMPLETE CBC AUTOMATED: CPT | Performed by: FAMILY MEDICINE

## 2023-10-02 PROCEDURE — 99223 1ST HOSP IP/OBS HIGH 75: CPT | Performed by: EMERGENCY MEDICINE

## 2023-10-02 PROCEDURE — 85007 BL SMEAR W/DIFF WBC COUNT: CPT | Performed by: FAMILY MEDICINE

## 2023-10-02 PROCEDURE — 99254 IP/OBS CNSLTJ NEW/EST MOD 60: CPT | Performed by: SURGERY

## 2023-10-02 PROCEDURE — 80053 COMPREHEN METABOLIC PANEL: CPT | Performed by: FAMILY MEDICINE

## 2023-10-02 PROCEDURE — 99239 HOSP IP/OBS DSCHRG MGMT >30: CPT | Performed by: INTERNAL MEDICINE

## 2023-10-02 RX ORDER — NICOTINE 21 MG/24HR
1 PATCH, TRANSDERMAL 24 HOURS TRANSDERMAL DAILY
Status: CANCELLED | OUTPATIENT
Start: 2023-10-03

## 2023-10-02 RX ORDER — TRAZODONE HYDROCHLORIDE 100 MG/1
100 TABLET ORAL
Status: CANCELLED | OUTPATIENT
Start: 2023-10-02

## 2023-10-02 RX ORDER — ONDANSETRON 2 MG/ML
4 INJECTION INTRAMUSCULAR; INTRAVENOUS EVERY 6 HOURS PRN
Status: DISCONTINUED | OUTPATIENT
Start: 2023-10-02 | End: 2023-10-05 | Stop reason: HOSPADM

## 2023-10-02 RX ORDER — AMOXICILLIN AND CLAVULANATE POTASSIUM 875; 125 MG/1; MG/1
1 TABLET, FILM COATED ORAL EVERY 12 HOURS SCHEDULED
Status: CANCELLED | OUTPATIENT
Start: 2023-10-02 | End: 2023-10-09

## 2023-10-02 RX ORDER — ENOXAPARIN SODIUM 100 MG/ML
40 INJECTION SUBCUTANEOUS DAILY
Status: DISCONTINUED | OUTPATIENT
Start: 2023-10-02 | End: 2023-10-05 | Stop reason: HOSPADM

## 2023-10-02 RX ORDER — ALBUTEROL SULFATE 90 UG/1
2 AEROSOL, METERED RESPIRATORY (INHALATION) EVERY 6 HOURS PRN
Status: CANCELLED | OUTPATIENT
Start: 2023-10-02

## 2023-10-02 RX ORDER — GABAPENTIN 300 MG/1
300 CAPSULE ORAL EVERY 8 HOURS PRN
Status: DISCONTINUED | OUTPATIENT
Start: 2023-10-02 | End: 2023-10-05 | Stop reason: HOSPADM

## 2023-10-02 RX ORDER — AMOXICILLIN AND CLAVULANATE POTASSIUM 875; 125 MG/1; MG/1
1 TABLET, FILM COATED ORAL EVERY 12 HOURS SCHEDULED
Status: DISCONTINUED | OUTPATIENT
Start: 2023-10-02 | End: 2023-10-05 | Stop reason: HOSPADM

## 2023-10-02 RX ORDER — AMOXICILLIN AND CLAVULANATE POTASSIUM 875; 125 MG/1; MG/1
1 TABLET, FILM COATED ORAL EVERY 12 HOURS SCHEDULED
Status: DISCONTINUED | OUTPATIENT
Start: 2023-10-02 | End: 2023-10-02 | Stop reason: HOSPADM

## 2023-10-02 RX ORDER — NICOTINE 21 MG/24HR
21 PATCH, TRANSDERMAL 24 HOURS TRANSDERMAL DAILY
Status: DISCONTINUED | OUTPATIENT
Start: 2023-10-02 | End: 2023-10-05 | Stop reason: HOSPADM

## 2023-10-02 RX ORDER — BUPRENORPHINE 20 UG/H
20 PATCH TRANSDERMAL
Status: DISCONTINUED | OUTPATIENT
Start: 2023-10-02 | End: 2023-10-04

## 2023-10-02 RX ORDER — TRAZODONE HYDROCHLORIDE 50 MG/1
50 TABLET ORAL
Status: DISCONTINUED | OUTPATIENT
Start: 2023-10-02 | End: 2023-10-05 | Stop reason: HOSPADM

## 2023-10-02 RX ORDER — CLONIDINE HYDROCHLORIDE 0.1 MG/1
0.1 TABLET ORAL EVERY 6 HOURS PRN
Status: DISCONTINUED | OUTPATIENT
Start: 2023-10-02 | End: 2023-10-05 | Stop reason: HOSPADM

## 2023-10-02 RX ORDER — HYDROXYZINE HYDROCHLORIDE 25 MG/1
25 TABLET, FILM COATED ORAL EVERY 6 HOURS PRN
Status: CANCELLED | OUTPATIENT
Start: 2023-10-02

## 2023-10-02 RX ORDER — CLONAZEPAM 1 MG/1
2 TABLET ORAL EVERY 6 HOURS PRN
Status: DISCONTINUED | OUTPATIENT
Start: 2023-10-02 | End: 2023-10-05 | Stop reason: HOSPADM

## 2023-10-02 RX ORDER — ENOXAPARIN SODIUM 100 MG/ML
40 INJECTION SUBCUTANEOUS DAILY
Status: CANCELLED | OUTPATIENT
Start: 2023-10-03

## 2023-10-02 RX ORDER — LANOLIN ALCOHOL/MO/W.PET/CERES
6 CREAM (GRAM) TOPICAL
Status: DISCONTINUED | OUTPATIENT
Start: 2023-10-02 | End: 2023-10-05 | Stop reason: HOSPADM

## 2023-10-02 RX ORDER — ACETAMINOPHEN 325 MG/1
650 TABLET ORAL EVERY 6 HOURS PRN
Status: DISCONTINUED | OUTPATIENT
Start: 2023-10-02 | End: 2023-10-05 | Stop reason: HOSPADM

## 2023-10-02 RX ADMIN — TRAZODONE HYDROCHLORIDE 50 MG: 50 TABLET ORAL at 21:29

## 2023-10-02 RX ADMIN — ENOXAPARIN SODIUM 40 MG: 40 INJECTION SUBCUTANEOUS at 10:41

## 2023-10-02 RX ADMIN — NICOTINE 21 MG: 21 PATCH, EXTENDED RELEASE TRANSDERMAL at 15:20

## 2023-10-02 RX ADMIN — LORAZEPAM 1 MG: 2 INJECTION INTRAMUSCULAR; INTRAVENOUS at 02:43

## 2023-10-02 RX ADMIN — AMOXICILLIN AND CLAVULANATE POTASSIUM 1 TABLET: 875; 125 TABLET, FILM COATED ORAL at 12:00

## 2023-10-02 RX ADMIN — METRONIDAZOLE 500 MG: 500 TABLET ORAL at 06:21

## 2023-10-02 RX ADMIN — Medication 1 PATCH: at 10:40

## 2023-10-02 RX ADMIN — CEFEPIME HYDROCHLORIDE 2000 MG: 2 INJECTION, SOLUTION INTRAVENOUS at 10:46

## 2023-10-02 RX ADMIN — MULTIPLE VITAMINS W/ MINERALS TAB 1 TABLET: TAB ORAL at 13:50

## 2023-10-02 RX ADMIN — BUPRENORPHINE 20 MCG: 20 PATCH, EXTENDED RELEASE TRANSDERMAL at 13:50

## 2023-10-02 RX ADMIN — MELATONIN TAB 3 MG 6 MG: 3 TAB at 21:29

## 2023-10-02 NOTE — PLAN OF CARE
Problem: PAIN - ADULT  Goal: Verbalizes/displays adequate comfort level or baseline comfort level  Description: Interventions:  - Encourage patient to monitor pain and request assistance  - Assess pain using appropriate pain scale  - Administer analgesics based on type and severity of pain and evaluate response  - Implement non-pharmacological measures as appropriate and evaluate response  - Consider cultural and social influences on pain and pain management  - Notify physician/advanced practitioner if interventions unsuccessful or patient reports new pain  Outcome: Progressing     Problem: INFECTION - ADULT  Goal: Absence or prevention of progression during hospitalization  Description: INTERVENTIONS:  - Assess and monitor for signs and symptoms of infection  - Monitor lab/diagnostic results  - Monitor all insertion sites, i.e. indwelling lines, tubes, and drains  - Monitor endotracheal if appropriate and nasal secretions for changes in amount and color  - Irwin appropriate cooling/warming therapies per order  - Administer medications as ordered  - Instruct and encourage patient and family to use good hand hygiene technique  - Identify and instruct in appropriate isolation precautions for identified infection/condition  Outcome: Progressing  Goal: Absence of fever/infection during neutropenic period  Description: INTERVENTIONS:  - Monitor WBC    Outcome: Progressing     Problem: SAFETY ADULT  Goal: Maintain or return to baseline ADL function  Description: INTERVENTIONS:  -  Assess patient's ability to carry out ADLs; assess patient's baseline for ADL function and identify physical deficits which impact ability to perform ADLs (bathing, care of mouth/teeth, toileting, grooming, dressing, etc.)  - Assess/evaluate cause of self-care deficits   - Assess range of motion  - Assess patient's mobility; develop plan if impaired  - Assess patient's need for assistive devices and provide as appropriate  - Encourage maximum independence but intervene and supervise when necessary  - Involve family in performance of ADLs  - Assess for home care needs following discharge   - Consider OT consult to assist with ADL evaluation and planning for discharge  - Provide patient education as appropriate  Outcome: Progressing  Goal: Maintains/Returns to pre admission functional level  Description: INTERVENTIONS:  - Perform BMAT or MOVE assessment daily.   - Set and communicate daily mobility goal to care team and patient/family/caregiver. - Collaborate with rehabilitation services on mobility goals if consulted  - Perform Range of Motion 2 times a day. - Reposition patient every 2 hours. - Dangle patient 2 times a day  - Stand patient 2 times a day  - Ambulate patient 2 times a day  - Out of bed to chair 2 times a day   - Out of bed for meals 2 times a day  - Out of bed for toileting  - Record patient progress and toleration of activity level   Outcome: Progressing     Problem: DISCHARGE PLANNING  Goal: Discharge to home or other facility with appropriate resources  Description: INTERVENTIONS:  - Identify barriers to discharge w/patient and caregiver  - Arrange for needed discharge resources and transportation as appropriate  - Identify discharge learning needs (meds, wound care, etc.)  - Arrange for interpretive services to assist at discharge as needed  - Refer to Case Management Department for coordinating discharge planning if the patient needs post-hospital services based on physician/advanced practitioner order or complex needs related to functional status, cognitive ability, or social support system  Outcome: Progressing     Problem: Knowledge Deficit  Goal: Patient/family/caregiver demonstrates understanding of disease process, treatment plan, medications, and discharge instructions  Description: Complete learning assessment and assess knowledge base.   Interventions:  - Provide teaching at level of understanding  - Provide teaching via preferred learning methods  Outcome: Progressing

## 2023-10-02 NOTE — DISCHARGE INSTR - OTHER ORDERS
[address] for Recovery  Erasmo PAUL, [address] [facility] on [facility]  [address] [facility] [address] [facility]   Certified   [phone]

## 2023-10-02 NOTE — CASE MANAGEMENT
Case Management Discharge Planning Note    Patient name [facility]  Location [facility] [facility] /[facility] 2 Brigid Sanchez* MRN [id number]  : 1970 Date 10/2/2023       Current Admission Date: 10/1/2023  Current Admission Diagnosis:Open leg wound, right, subsequent encounter   Patient Active Problem List    Diagnosis Date Noted   • Fentanyl use disorder, severe, dependence ([address]) 10/01/2023   • Open leg wound, right, subsequent encounter 10/01/2023   • Multiple wounds 10/01/2023   • Suicidal ideation 2020   • Substance abuse ([address]) 2019   • Cellulitis of left forearm 2019   • Sepsis ([address]) 2019   • Pneumonia 2019   • Acute drug withdrawal syndrome ([address]) 2019   • Fibrosis of liver 2017   • Severe episode of recurrent major depressive disorder, without psychotic features ([address]) 2017   • Heroin use disorder, severe, dependence ([address]) 2017   • BMI 35.0-35.9,adult 2017   • Chronic bilateral back pain 2017   • Chronic hepatitis C without hepatic coma ([address]) 2017   • Dental disease 2017   • Fatigue due to exposure 2017   • Financial problems 2017   • Sleep apnea 2017      LOS (days): 1  Geometric Mean LOS (GMLOS) (days):   Days to GMLOS:     OBJECTIVE:  Risk of Unplanned Readmission Score: 15.52         Current admission status: Inpatient   Preferred Pharmacy:   [facility] [address] [place] [address]  Phone: [phone] Fax: [phone] [address] [address]  Phone: [phone] Fax: [phone]    Primary Care Provider: Thierno Elise MD    Primary Insurance: Rain Butterfield  Secondary Insurance:     DISCHARGE DETAILS:      Treatment Team Recommendation: Substance Abuse Treatment  Discharge Destination Plan[de-identified] Substance Abuse Treatment           Additional Comments: MD reports pt needs to be transfer to [facility] Detox Unit today. Completed Med Nec and put on chart.

## 2023-10-02 NOTE — ASSESSMENT & PLAN NOTE
· Patient also reports using cocaine and THC  · Encourage cessation  · Consult case management, appreciate recommendations

## 2023-10-02 NOTE — CONSULTS
Consultation - Acute Care Surgery  Keo Esposito 48 y.o. male MRN: [id number]  Unit/Bed#: [address] [address] -01 Encounter: [id number]        Assessment/Plan     Assessment:  82-year-old male with IV drug use presents with bilateral lower extremity wounds. General surgery consulted for evaluation of need for debridement of right lateral lower extremity wounds. CT scan of right lower extremity reviewed. No obvious collection inferior to wounds. Plan:  No surgical intervention indicated at this point. Wounds appear to be superficial with overlying scab as opposed to necrosis/ eschar. Would not unroofed these wounds as it would likely increase risk for site infection in setting of active IV drug use to these areas. Recommend wound care team consult for management of bilateral lower extremity wounds. Consult order placed this morning. General surgery will sign off. Please reach out with questions or concerns. History of Present Illness     HPI:  Keo Esposito is a 48 y.o. male with a history of IV drug abuse who presents bilateral lower extremity wounds. Patient states he injects bilateral extremities with needles for IV drug use and has persistent wounds to both lower extremities. Wound has been worsening his right lower extremity but does state he is still actively injecting in this area. Has pain with ambulation. Denies nausea vomiting, fevers or chills. Review of Systems   All other systems reviewed and are negative.   Systems negative unless otherwise stated in HPI    Historical Information   Past Medical History:   Diagnosis Date   • Addiction to drug [facility])    • Anxiety    • Depression    • Drug abuse ([address] [facility])    • Hepatitis C virus    • Psychiatric illness    • Sleep apnea      Past Surgical History:   Procedure Laterality Date   • CYST REMOVAL     • ELBOW SURGERY       Social History   Social History     Substance and Sexual Activity   Alcohol Use Not Currently     Social History Substance and Sexual Activity   Drug Use Yes   • Types: Heroin, Cocaine, Marijuana    Comment: iv heroin and cocaine, smokes k2 also, all daily     Social History     Tobacco Use   Smoking Status Every Day   • Packs/day: 1.00   • Types: Cigarettes   Smokeless Tobacco Never     Family History: History reviewed. No pertinent family history. Meds/Allergies   PTA meds:   Prior to Admission Medications   Prescriptions Last Dose Informant Patient Reported? Taking?    FLUoxetine (PROzac) 20 mg capsule Not Taking  No No   Sig: Take 1 capsule (20 mg total) by mouth daily At 9am   Patient not taking: Reported on 7/16/2020   albuterol (VENTOLIN HFA) 90 mcg/act inhaler Not Taking  No No   Sig: Inhale 2 puffs every 6 (six) hours as needed for wheezing   Patient not taking: Reported on 7/16/2020   hydrOXYzine HCL (ATARAX) 25 mg tablet Not Taking  No No   Sig: Take 1 tablet (25 mg total) by mouth every 6 (six) hours as needed (mild anxiety)   Patient not taking: Reported on 7/16/2020   risperiDONE (RisperDAL) 1 mg tablet Not Taking  No No   Sig: Take 1 tablet (1 mg total) by mouth daily at bedtime   Patient not taking: Reported on 7/16/2020   traZODone (DESYREL) 100 mg tablet Not Taking  No No   Sig: Take 1 tablet (100 mg total) by mouth daily at bedtime as needed for sleep   Patient not taking: Reported on 7/16/2020      Facility-Administered Medications: None     No Known Allergies    Objective   First Vitals:   Blood Pressure: 146/85 (10/01/23 1656)  Pulse: 99 (10/01/23 1656)  Temperature: 100.4 °F (38 °C) (10/01/23 1656)  Temp Source: Oral (10/01/23 1656)  Respirations: 20 (10/01/23 1656)  Height: 5' 5" (165.1 cm) (10/01/23 1656)  Weight - Scale: 72 kg (158 lb 11.7 oz) (10/01/23 1656)  SpO2: 98 % (10/01/23 1656)    Current Vitals:   Blood Pressure: 119/79 (10/02/23 0738)  Pulse: 55 (10/02/23 0738)  Temperature: 98.1 °F (36.7 °C) (10/02/23 0738)  Temp Source: Oral (10/02/23 0241)  Respirations: 18 (10/02/23 0241)  Height: 5' 5" (165.1 cm) (10/01/23 1656)  Weight - Scale: 72.7 kg (160 lb 4.4 oz) (10/01/23 2057)  SpO2: 96 % (10/02/23 0738)      Intake/Output Summary (Last 24 hours) at 10/2/2023 0741  Last data filed at 10/1/2023 1953  Gross per 24 hour   Intake 200 ml   Output --   Net 200 ml       Invasive Devices     Peripheral Intravenous Line  Duration           Peripheral IV 10/01/23 Right;Upper;Ventral (anterior) Arm <1 day                Physical Exam  Vitals and nursing note reviewed. Constitutional:       General: He is not in acute distress. Appearance: He is ill-appearing. HENT:      Head: Normocephalic and atraumatic. Mouth/Throat:      Mouth: Mucous membranes are moist.      Pharynx: Oropharynx is clear. Eyes:      Extraocular Movements: Extraocular movements intact. Cardiovascular:      Rate and Rhythm: Normal rate and regular rhythm. Pulmonary:      Effort: Pulmonary effort is normal.   Abdominal:      General: Abdomen is flat. Palpations: Abdomen is soft. Musculoskeletal:      Cervical back: Neck supple. Comments: Bilateral lower extremity wounds on medial and lateral aspects. Most significant wound on lateral aspect of right lower extremity. See picture above. No crepitus, fluctuance, induration. Nontender to palpation. Skin:     General: Skin is warm and dry. Findings: Lesion present. Neurological:      Mental Status: He is alert and oriented to person, place, and time. Psychiatric:         Mood and Affect: Mood normal.         Behavior: Behavior normal.         Thought Content:  Thought content normal.         Judgment: Judgment normal.         Lab Results:   CBC:   Lab Results   Component Value Date    WBC 5.73 10/02/2023    HGB 13.4 10/02/2023    HCT 42.1 10/02/2023    MCV 98 10/02/2023     10/02/2023    RBC 4.28 10/02/2023    MCH 31.3 10/02/2023    MCHC 31.8 10/02/2023    RDW 12.9 10/02/2023    MPV 9.3 10/02/2023    NRBC 0 10/01/2023   , CMP:   Lab Results Component Value Date    SODIUM 135 10/02/2023    K 4.3 10/02/2023     10/02/2023    CO2 28 10/02/2023    BUN 14 10/02/2023    CREATININE 0.87 10/02/2023    CALCIUM 8.3 (L) 10/02/2023    AST 34 10/02/2023    ALT 39 10/02/2023    ALKPHOS 64 10/02/2023    EGFR 99 10/02/2023     Imaging: I have personally reviewed pertinent reports. EKG, Pathology, and Other Studies: I have personally reviewed pertinent reports.       Code Status: Level 1 - Full Code  Advance Directive and Living Will:      Power of :    POLST:

## 2023-10-02 NOTE — CERTIFIED RECOVERY SPECIALIST
Certified  Note    Patient name: Art Sumo  Location: [facility] /South [address]  Attending:  Jose Gonsalves MD MRN [id number]  : 1970  Age: 48 y.o.     Sex: male Date 10/2/2023         Substance Use History:     Social History     Substance and Sexual Activity   Alcohol Use Not Currently        Social History     Substance and Sexual Activity   Drug Use Yes   • Types: Heroin, Cocaine, Marijuana    Comment: iv heroin and cocaine, smokes k2 also, all daily     Added recovery resources to [facility]

## 2023-10-02 NOTE — PROGRESS NOTES
Vancomycin Monitoring    Demographics    Junior II Jose Nuvia    Assessment    Today is day 1 of vancomycin thearpy for ssti. Patient received first dose of vanco 1,000 mg today at approx 1900. Renal function is stable.    Plan    Continuing with vancomycin 1,000 mg q12h resuming at 0800    Level ordered for 10/03 am labs     Juju Hobbs PharmD

## 2023-10-02 NOTE — UTILIZATION REVIEW
NOTIFICATION OF INPATIENT ADMISSION   AUTHORIZATION REQUEST   SERVICING FACILITY:   [address] [place] [address]  Tax ID: [id number]  NPI: [id number] ATTENDING PROVIDER:  Attending Name and NPI#: Raf Boudreaux [[id number]]  Address: [address]  Phone: [phone]     ADMISSION INFORMATION:  Place of Service: Inpatient [address]  Place of Service Code: 21  Inpatient Admission Date/Time: 10/1/23  7:11 PM  Discharge Date/Time: No discharge date for patient encounter. Admitting Diagnosis Code/Description:  Substance abuse ([address] ) [F19.10]  Abscess [L02.91]  Wound infection [T14. 8XXA, L08.9]  Multiple open wounds of lower leg, left, initial encounter [S81.802A]  Multiple open wounds of right lower leg [S81.801A]     UTILIZATION REVIEW CONTACT:  Precious Catalan, Utilization   Network Utilization Review Department  Phone: [phone]  Fax [phone]  Email: [email or web address]  Contact for approvals/pending authorizations, clinical reviews, and discharge. PHYSICIAN ADVISORY SERVICES:  Medical Necessity Denial & Jiiu-pr-Drtq Review  Phone: [phone]  Fax: [phone]  Email: [email or web address]

## 2023-10-02 NOTE — ASSESSMENT & PLAN NOTE
History of IVDA last use yesterday along with mood disorder not on medications anymore who presents for worsening calf/leg infections  · Small collection noted on CT imaging discussed with surgery who recommends Augmentin for 7 days

## 2023-10-02 NOTE — ASSESSMENT & PLAN NOTE
· Uses 10 bags of heroin last used 10:30 AM yesterday. · He has symptoms of withdrawal  · Case was discussed with toxicology.   Patient being transferred to detox unit today for stabilization

## 2023-10-02 NOTE — H&P
[facility]  H&P  Name: Heidi Antonio 48 y.o. male I MRN: [id number]  Unit/Bed#: ED-02 I Date of Admission: 10/1/2023   Date of Service: 10/1/2023 I Hospital Day: 0      Assessment/Plan   * Open leg wound, right, subsequent encounter  Assessment & Plan  This is a 49 yo male with history of IVDU using Fentanyl 10 bags every 4 hours, who presents with multiple wounds at site of injection including bilateral antecubital fossa, lateral calves, with the most concerning wound on the right lateral calf which appears necrotic with surrounding area of erythema  · Without systemic manifestations of infection  · Discussed with general surgery, appreciate input  · I will order a CT of right lower extremity  · Proceed with broad-spectrum antibiotics for now with cefepime, vancomycin, Flagyl  · Serial exams  · Monitor CBC, vital signs    Multiple wounds  Assessment & Plan  In setting of IVDU  General surgery consulted  Please refer to above    Fentanyl use disorder, severe, dependence ([address])  Assessment & Plan  · Patient reports use of 10 bags of fentanyl every 4 hours, last use was 10/1/2023 at 11:30 AM  · He is concerned for impending withdrawal  · Patient states he is interested in stopping drug use  · We will request toxicology consult for guidance with impending withdrawal treatment    Sleep apnea  Assessment & Plan  Offer CPAP HS         VTE Pharmacologic Prophylaxis: VTE Score: 3 Moderate Risk (Score 3-4) - Pharmacological DVT Prophylaxis Ordered: enoxaparin (Lovenox). Code Status: Level 1 - Full Code   Discussion with family/patient: patient. Anticipated Length of Stay: Patient will be admitted on an inpatient basis with an anticipated length of stay of greater than 2 midnights secondary to IV Rx, close monitoring, potential need for debridement. Total Time Spent on Date of Encounter in care of patient: 75 mins.  This time was spent on one or more of the following: performing physical exam; counseling and coordination of care; obtaining or reviewing history; documenting in the medical record; reviewing/ordering tests, medications or procedures; communicating with other healthcare professionals and discussing with patient's family/caregivers. Chief Complaint: Right leg pain, wounds, inability to walk    History of Present Illness:  Kayden Chilel is a 48 y.o. male with a PMH of IV drug use using 10 bags of fentanyl approximately every 4 hours who presents with multiple wounds which have become worse. The patient states that he is having a worsening wound in his right leg and is having increased pain and difficulty walking. The patient denies chills or fevers. He does admit to injecting at the site of the wounds. He does state that he is concerned that he will start to withdraw from fentanyl dependence. He does state that he is interested in treatment for his substance abuse and would like to stop using illicit drugs. Currently he denies nausea or vomiting. Denies chest pain, shortness of breath or palpitations. Denies urinary symptoms. The patient is not taking any home medications. Review of Systems:  Review of Systems   Constitutional: Negative for chills and fever. HENT: Negative for congestion. Eyes: Negative for visual disturbance. Respiratory: Negative for shortness of breath and wheezing. Cardiovascular: Negative for chest pain and palpitations. Gastrointestinal: Negative for abdominal pain, constipation, diarrhea, nausea and vomiting. Endocrine: Negative for polydipsia and polyuria. Genitourinary: Negative for dysuria. Musculoskeletal: Positive for gait problem. Skin: Positive for wound (multiple wounds, worsening R leg wound). Neurological: Negative for dizziness. Hematological: Negative for adenopathy. Psychiatric/Behavioral: Negative for confusion.        Past Medical and Surgical History:   Past Medical History:   Diagnosis Date   • Addiction to drug [facility])    • Anxiety    • Depression    • Drug abuse ([address] )    • Hepatitis C virus    • Psychiatric illness    • Sleep apnea        Past Surgical History:   Procedure Laterality Date   • CYST REMOVAL     • ELBOW SURGERY         Meds/Allergies:  Prior to Admission medications    Medication Sig Start Date End Date Taking? Authorizing Provider   albuterol (VENTOLIN HFA) 90 mcg/act inhaler Inhale 2 puffs every 6 (six) hours as needed for wheezing  Patient not taking: Reported on 7/16/2020 9/25/19   Pablo Rockwell MD   FLUoxetine (PROzac) 20 mg capsule Take 1 capsule (20 mg total) by mouth daily At 9am  Patient not taking: Reported on 7/16/2020 4/25/20   Pablo Reese PA-C   hydrOXYzine HCL (ATARAX) 25 mg tablet Take 1 tablet (25 mg total) by mouth every 6 (six) hours as needed (mild anxiety)  Patient not taking: Reported on 7/16/2020 4/24/20   Pablo Reese PA-C   risperiDONE (RisperDAL) 1 mg tablet Take 1 tablet (1 mg total) by mouth daily at bedtime  Patient not taking: Reported on 7/16/2020 4/24/20   Pablo Reese PA-C   traZODone (DESYREL) 100 mg tablet Take 1 tablet (100 mg total) by mouth daily at bedtime as needed for sleep  Patient not taking: Reported on 7/16/2020 4/24/20   Pablo Reese PA-C     I have reviewed home medications with a medical source (PCP, Pharmacy, other).     Allergies: No Known Allergies    Social History:  Marital Status: Single   Occupation: Unemployed  Patient Pre-hospital Living Situation: not addressed  Patient Pre-hospital Level of Mobility: walks  Patient Pre-hospital Diet Restrictions: none  Substance Use History:   Social History     Substance and Sexual Activity   Alcohol Use Not Currently     Social History     Tobacco Use   Smoking Status Every Day   • Packs/day: 1.00   • Types: Cigarettes   Smokeless Tobacco Never     Social History     Substance and Sexual Activity   Drug Use Yes   • Types: Heroin, Cocaine, Marijuana    Comment: iv heroin and cocaine, smokes k2 also, all daily       Family History:  History reviewed. No pertinent family history. Physical Exam:     Vitals:   Blood Pressure: 121/71 (10/01/23 1853)  Pulse: 75 (10/01/23 1921)  Temperature: 100.4 °F (38 °C) (10/01/23 1656)  Temp Source: Oral (10/01/23 1656)  Respirations: 19 (10/01/23 1853)  Height: 5' 5" (165.1 cm) (10/01/23 1656)  Weight - Scale: 72 kg (158 lb 11.7 oz) (10/01/23 1656)  SpO2: 96 % (10/01/23 1853)    Physical Exam  Constitutional:       General: He is not in acute distress. HENT:      Head: Normocephalic and atraumatic. Nose: No congestion. Eyes:      Conjunctiva/sclera: Conjunctivae normal.   Cardiovascular:      Rate and Rhythm: Normal rate and regular rhythm. Heart sounds: No murmur heard. Pulmonary:      Effort: No respiratory distress. Abdominal:      General: There is no distension. Musculoskeletal:      Right lower leg: No edema. Left lower leg: No edema. Skin:     Comments: Multiple wounds in bilateral antecubital fossa, lower extremities, most prominent in right lateral calf, please refer to media -necrotic lesion with surrounding erythema, raised area     Neurological:      Mental Status: He is oriented to person, place, and time.    Psychiatric:         Mood and Affect: Mood normal.          Additional Data:     Lab Results:  Results from last 7 days   Lab Units 10/01/23  1745   WBC Thousand/uL 6.85   HEMOGLOBIN g/dL 14.6   HEMATOCRIT % 45.2   PLATELETS Thousands/uL 224   NEUTROS PCT % 59   LYMPHS PCT % 30   MONOS PCT % 9   EOS PCT % 2     Results from last 7 days   Lab Units 10/01/23  1745   SODIUM mmol/L 139   POTASSIUM mmol/L 3.9   CHLORIDE mmol/L 104   CO2 mmol/L 31   BUN mg/dL 16   CREATININE mg/dL 0.93   ANION GAP mmol/L 4   CALCIUM mg/dL 9.0   ALBUMIN g/dL 3.5   TOTAL BILIRUBIN mg/dL 0.41   ALK PHOS U/L 75   ALT U/L 48   AST U/L 39   GLUCOSE RANDOM mg/dL 80     Results from last 7 days   Lab Units 10/01/23  1745   INR  1.05 Results from last 7 days   Lab Units 10/01/23  1745   LACTIC ACID mmol/L 1.3   PROCALCITONIN ng/ml 0.10       Lines/Drains:  Invasive Devices     Peripheral Intravenous Line  Duration           Peripheral IV 10/01/23 Right;Upper;Ventral (anterior) Arm <1 day                    Imaging:   XR tibia fibula 2 views RIGHT   ED Interpretation by Dheeraj Graham DO (10/01 1836)   No foreign bodies, subcutaneous emphysema, or acute osseous abnormalities      XR tibia fibula 2 views LEFT   ED Interpretation by Dheeraj Graham DO (10/01 1836)   No foreign bodies, subcutaneous emphysema, or acute osseous abnormalities      CT lower extremity w contrast right    (Results Pending)       EKG and Other Studies Reviewed on Admission:   · EKG: NSR. HR 68.    ** Please Note: This note has been constructed using a voice recognition system.  **

## 2023-10-02 NOTE — NURSING NOTE
Patient was transferred to detox program in no distress. No c/o pain, able to make needs known. Patient was transported by transport team via stretcher. All belongings left with the patient.

## 2023-10-02 NOTE — TRANSPORTATION MEDICAL NECESSITY
Section I - General Information    Name of Patient: Keo Esposito                 : 1970    Medicare #: [id number]  Transport Date: 10/02/23 (PCS is valid for round trips on this date and for all repetitive trips in the 60-day range as noted below.)  Origin: [place] [address]                                                         Destination:  [facility]    Is the pt's stay covered under Medicare Part A (PPS/DRG)   []     Closest appropriate facility? If no, why is transport to more distant facility required? Yes  If hospice pt, is this transport related to pt's terminal illness? NA       Section II - Medical Necessity Questionnaire  Ambulance transportation is medically necessary only if other means of transport are contraindicated or would be potentially harmful to the patient. To meet this requirement, the patient must either be "bed confined" or suffer from a condition such that transport by means other than ambulance is contraindicated by the patient's condition. The following questions must be answered by the medical professional signing below for this form to be valid:    1)  Describe the MEDICAL CONDITION (physical and/or mental) of this patient  [address] that requires the patient to be transported in an ambulance and why transport by other means is contraindicated by the patient's condition: Patient needs to be transfer to [facility] as he uses fentanyl with severe withdrawal, R open leg wound, contact for MDRO in wound, HT 5'5" and  lbs. 2) Is the patient "bed confined" as defined below? No  To be "be confined" the patient must satisfy all three of the following conditions: (1) unable to get up from bed without Assistance; AND (2) unable to ambulate; AND (3) unable to sit in a chair or wheelchair.     3) Can this patient safely be transported by car or wheelchair van (i.e., seated during transport without a medical attendant or monitoring)? No    4) In addition to completing questions 1-3 above, please check any of the following conditions that apply*:   *Note: supporting documentation for any boxes checked must be maintained in the patient's medical records. If hosp-hosp transfer, describe services needed at 2nd facility not available at 1st facility? Fentanyl withdrawal      Patient is confused  Moderate/severe pain on movement   Danger to self/others  Medical attendant required   Special handling/isolation/infection control precautions required       Section III - Signature of Physician or Healthcare Professional  I certify that the above information is true and correct based on my evaluation of this patient, and represent that the patient requires transport by ambulance and that other forms of transport are contraindicated. I understand that this information will be used by the Centers for Medicare and Medicaid Services (CMS) to support the determination of medical necessity for ambulance services, and I represent that I have personal knowledge of the patient's condition at time of transport. []  If this box is checked, I also certify that the patient is physically or mentally incapable of signing the ambulance service's claim and that the institution with which I am affiliated has furnished care, services, or assistance to the patient. My signature below is made on behalf of the patient pursuant to 42 CFR §424.36(b)(4). In accordance with 42 CFR §424.37, the specific reason(s) that the patient is physically or mentally incapable of signing the claim form is as follows.       Signature of Physician* or Healthcare Professional_____LIBAN Alegria LSW_________________________________________________________  Signature Date 10/02/23 (For scheduled repetitive transports, this form is not valid for transports performed more than 60 days after this date)    Printed Name & Credentials of Physician or Healthcare Professional (MD, DO, RN, etc.)____Thuy Tobar, MSW, LSW____________________________  *Form must be signed by patient's attending physician for scheduled, repetitive transports.  For non-repetitive, unscheduled ambulance transports, if unable to obtain the signature of the attending physician, any of the following may sign (choose appropriate option below)  [] Physician Assistant []  Clinical Nurse Specialist []  Registered Nurse  []  Nurse Practitioner  [x] Discharge Planner

## 2023-10-02 NOTE — ASSESSMENT & PLAN NOTE
· Has extensive abscesses from IV injection across all extremities with open wounds on bilateral lower extremities which have already been evaluated by general surgery at [facility] - CLOSED signed off on the patient -no need for debridement at this time  · Augmentin for 7 days  · Continue to monitor wounds  · Consider I&D for various abscesses if no improvement symptoms or signs of progression

## 2023-10-02 NOTE — CASE MANAGEMENT
Psychosocial Assessment 1:1:   CM met with pt to complete intake. CM reviewed role of CM and pt's admission. Pt reports wanting to detox and considering option for injection. Pt reports knowing people that "got their life back" with the injection. Pt reports wanting outpatient treatment and follow up to maintain sobriety. CM reviewed option with  [facility] and pt in agreement with referral. Pt signed RANDY for [facility]. 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 Pt reports 10 bags fentanyl and cocaine daily. First use age 18 for both, last use 10/1/23. Pt reports longest sobriety is 4 years; reports due to active involvement in [employer]. Pt reports wanting sobriety and plans to reconnect with [employer]. Pt reports daily marijuana/K2 use; 1-2 dimes bags; reports amount is determined by access. 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 Pt reports residing with his brother Taran Zendejas and identifies Dario Travis as sober support. Pt reports brother is aware of pt's drug use and brother brought pt to ED. Pt unable to provide tel# for brother. Pt signed RANDY for brother. Pt completed Relapse Prevention Plan with CM     Admission / Details: transfer from [facility] for detox.      County: [place]       Insurance: [address] [place] [address]  Rx coverage: denies issues  Marital Status: single  Children: 2 adult children, no contact  Family: brother  Residence: private  Can return home: yes  Lives with: brother and brother's wife  Level of Ed: 11th grade  Work History: denies  Income/Source: [employer] [place] $280  [employer]: [employer]  Transportation: public  Legal Issues: denies  Pharmacy:  [address] [facility]  [address] [facility] Hx: SLQ 4/21-4/24/20  Trauma Hx: denies  Family Hx: denies  D&A Hx: see above  Medical: see H&P  DME: none  Tobacco: 1 PPD   Hx: denies  Access to firearms: denies  UDS Results: not completed  AUDIT: not completed  PAWSS: not completed  PCP: Sukumar Becerra MD [facility]  Psych: none  Therapist: none  ICM/ACT:  none  Community Supports:  brother  Stressors: unable to identify  Trigger: "I just want to get high."  Strengths:  unable to identify  Coping Skills: reading bible  ROIs Signed: brother Iesha iPna

## 2023-10-02 NOTE — PROGRESS NOTES
Kayden Chilel is a 48 y.o. male who is currently ordered Vancomycin IV with management by the Pharmacy Consult service. Relevant clinical data and objective / subjective history reviewed. Vancomycin Assessment:  Indication and Goal AUC/Trough: Soft tissue (goal -600, trough >10)  Clinical Status: New  Micro:     Renal Function:  SCr: 0.87 mg/dL  CrCl: 87.4 mL/min  Renal replacement: Not on dialysis  Days of Therapy: 2    Vancomycin Plan:  New Dosinmg IV q12h  Estimated AUC: 492 mcg*hr/mL  Estimated Trough: 15.1 mcg/mL  Next Level: 10/3 with am labs  Renal Function Monitoring: Daily BMP and [place] will continue to follow closely for s/sx of nephrotoxicity, infusion reactions and appropriateness of therapy. BMP and CBC will be ordered per protocol. We will continue to follow the patient’s culture results and clinical progress daily.     Briana Adams, Pharmacist

## 2023-10-02 NOTE — H&P
HISTORY & PHYSICAL EXAM  DEPARTMENT OF MEDICAL TOXICOLOGY  LEVEL 4 MEDICAL DETOX UNIT  Nevin Son 48 y.o. male MRN: [id number]  Unit/Bed#: 5T DETOX 511-01 Encounter: [id number]      Reason for Admission/Principal Problem: Opioid withdrawal, opioid use disorder  Admitting Provider: Nicole Ridley MD  Attending Provider: Oniel Borges MD   10/2/2023 12:57 PM      Acute drug withdrawal syndrome [facility])  Assessment & Plan  • Uses 10 bags of heroin last used 10:30 AM on 10/1/2023.   • Current withdrawal symptoms:  Chills, nausea, poor appetite  • Butrans patch  • Start microinduction pathway in 24 hours  • Supportive care and adjunct medications as needed  • Patient is requesting Sublocade and this can be continued once withdrawal has been stabilized    Polysubstance dependence ([address])  Assessment & Plan  · Patient also reports using cocaine and THC  · Encourage cessation  · Consult case management, appreciate recommendations    Heroin use disorder, severe, dependence ([address])  Assessment & Plan  · Patient uses approximately 10 bags of heroin daily IVDA, last use 10:30 AM on 10/1  · See above for management of withdrawal symptoms  · Encourage cessation  · Consult case management, appreciate recommendations    Open wounds involving multiple regions of upper and lower extremities with complication  Assessment & Plan  · Has extensive abscesses from IV injection across all extremities with open wounds on bilateral lower extremities which have [place] [facility] [place] patient -no need for debridement at this time  · Augmentin for 7 days  · Continue to monitor wounds  · Consider I&D for various abscesses if no improvement symptoms or signs of progression    Sleep apnea in adult  Assessment & Plan  · Patient has CPAP at home, suffers from FALLON  · Continue CPAP at bedtime    Tobacco dependency  Assessment & Plan  · Patient is requesting NRT  · Nicotine patch provided  · Encourage cessation               VTE Prophylaxis: Enoxaparin (Lovenox)  / reason for no mechanical VTE prophylaxis lower extremity wounds   Code Status: full      Anticipated Length of Stay:  Patient will be admitted on an Inpatient basis with an anticipated length of stay of  2  midnights. Justification for Hospital Stay: opioid withdrawal, IVDA, abscesses/wounds    For any questions or concerns, please Tiger Text the advanced practitioner in the role of [facility]-DETOX-AP On Call      This patient qualifies for Level IV medically managed intensive inpatient services under the criteria set by the American Society of Addiction Medicine, including dimensions 1-3. The patient is in withdrawal (or is intoxicated with high risk of withdrawal), with severe and unstable medical and/or psychiatric (dual diagnosis) problems, requiring requires 24-hour medical and nursing care and the full resources of a licensed hospital.          [facility] patient to medical detox unit and continue supportive care and stabilization of acute opioid withdrawal per medical toxicology/detox medication assisted treatment pathway. Complicated Opioid Withdrawal (ie associated with long acting agents, such as methadone or illicit fentanyl analogues):  • >72 hours: Routine COWS/induction as per uncomplicated opoid withdrawal (below)  • <72 hours:  • Adjunctive medications (see below), including clonazepam 1-2mg Q6 hrs PRN anxiety (or diazepam 5 mg if cannot take PO)  • >48 hours, test dose buprenorphine 0.5-1mg.    • If responds well, continue with 2mg Q2 hrs (total 8mg) holding for worsening withdrawal, then start maintenance dosing   • If responds poorly, follow next step below   • <48 hours and/or COWS < 6 or failed test dose, add Butrans transdermal patch 20-40mcg/hr, monitor for signs/symptoms of withdrawal   • If within first 24-48 hrs, can administer buprenorphine 0.5-1mg Q6 hrs x 4, followed by 2mg Q2 hrs x 4 the next day  • If surpassing 48 hrs, can administer buprenorphine 2mg Q2hrs if tolerated without transdermal patch or lower sublingual dose. • When complete, remove transdermal patch and start maintenance dosing   • For moderate-severe withdrawal (COWS >/= 8, may still consider routine induction with buprenorphine 8 mg if appropriate. • For worsened or precipitated withdrawal, consider adjunctive benzodiazepines and other comfort meds. Dexmedetomidine may be considered for severe precipitated withdrawal.         Uncomplicated Opioid Withdrawal:  Monitor opioid severity via Clinical Opioid Withdrawal Scale (COWS) Q4 hours and administer buprenorphine/naloxone 8mg/2mg when COWS >8, or when greater than 24 hours have elapsed from most recent opioid use (excluding long-acting opioids, such as methadone). Continue to monitor opioid severity Q30-60 minutes after first dose and administer additional buprenorphine 2-4mg every 30-60 minutes until COWS < 8 for two consecutive hours. Adjunctive medications administered as needed:  Clonidine 0.1 mg PO Q6 hours PRN anxiety or palpitations    Gabapentin 300mg PO Q8 hours PRN anxiety    Ibuprofen 600 mg PO Q6 hours PRN pain    Acetaminophen 1000mg PO Q8 hours PRN pain    Ondansetron 4 mg PO Q6 hours PRN N/V    Nicotine patch 7, 14, 21 mg  PRN nicotine withdrawal   Trazodone 50 mg PO QHS PRN sleep    Loperamide 4 mg PO PRN diarrhea up to 16 mg/day       The risks, benefits and mechanism of buprenorphine/naloxone were discussed and patient agreed to treatment. Case management consultation will take place to assist with coordination of subsequent treatment after discharge. Administer daily multivitamin. Evaluate and treat for coexisting substance use, such as nicotine. Discuss risk factors for infectious disease, such as history of intravenous drug abuse, and offer hepatitis and HIV screening if indicated.            Hx and PE limited by: none    HPI: Junior II Jose Pedersen is a 48y.o. year old male who presents with quest for opioid medically assisted therapy/detox. Past medical history significant for mood disorder, substance use disorder including K2, cocaine, THC, heroin/fentanyl IVDA and chronic wounds injection site sleep apnea. Initially presented to the emergency department on 10/1 for large abscess on the right lateral leg where he typically injects narcotics. Was then admitted for surgical evaluation and treatment. Patient was cleared from a surgical standpoint and started on Augmentin. During admission on medical service, patient requested to be started on Sublocade. Today prior to admission, patient began to develop symptoms of withdrawal.  Last known use was around 10:30 AM on 10/1. Patient began to complain of nausea, chills, body aches. On arrival to the unit, patient has normal vital signs.     Opioids currently used: heroin and fentanyl  Route of use: intravenous  Date/Time of Last Opioid Use: 10:30-11:30 AM on 10/1  Current Signs/Symptoms of Opioid Withdrawal: restlessness, anxiety and nausea    COWS score:   Clinical Opiate Withdrawal Scale  Pulse: 63  Resting Pulse Rate: Measured After Patient is Sitting or Lying for One Minute: Pulse rate 80 or below  GI Upset: Over Last Half Hour: No GI symptoms  Sweating: Over Past Half Hour Not Accounted for by Room Temperature of Patient Activity: Subjective report of chills or flushing  Tremor: Observation of Outstretched Hands: No tremor  Restlessness: Observation During Assessment: Able to sit still  Yawning: Observation During Assessment: No yawning  Pupil Size: Pupils pinned or normal size for room light  Anxiety and Irritability: None  Bone or Joint Aches: If Patient was Having Pain Previously, Only the Additional Component Attributed to Opiate Withdrawal is Scored: Not present  Gooseflesh Skin: Skin is smooth  Runny Nose or Tearing: Not Accounted for by Cold Symptoms or Allergies: Nasal stuffiness of unusually moist eyes  Clinical Opiate Withdrawal Scale Total Score: 2        Methadone & Buprenorphine History  History of prior treatment for opioid dependence? yes  Currently on Methadone Maintenance? no  History of prior treatment with Suboxone? no  Currently taking Suboxone? no  History of using Suboxone without having a prescription? no  History of IVDA? yes  Co-existing substance use? yes    Review of PDMP: yes    Social History     Substance and Sexual Activity   Alcohol Use Not Currently     Social History     Substance and Sexual Activity   Drug Use Yes   • Types: Heroin, Cocaine, Marijuana    Comment: iv heroin and cocaine, smokes k2 also, all daily     Social History     Tobacco Use   Smoking Status Every Day   • Packs/day: 1.00   • Types: Cigarettes   Smokeless Tobacco Never       Review of Systems   Constitutional: Positive for appetite change and chills. Negative for activity change and fever. HENT: Negative for sneezing and sore throat. Eyes: Negative for pain. Respiratory: Negative for apnea, cough, choking, chest tightness, shortness of breath, wheezing and stridor. Cardiovascular: Negative for chest pain, palpitations and leg swelling. Gastrointestinal: Positive for nausea. Negative for abdominal distention, abdominal pain, anal bleeding, blood in stool, constipation, diarrhea, rectal pain and vomiting. Genitourinary: Negative for dysuria and hematuria. Musculoskeletal: Positive for myalgias. Negative for back pain, gait problem, neck pain and neck stiffness. Skin: Positive for wound. Negative for color change, pallor and rash. Neurological: Negative for dizziness, tremors, seizures, syncope, facial asymmetry, speech difficulty, weakness, light-headedness, numbness and headaches.        Historical Information   Past Medical History:   Diagnosis Date   • Addiction to drug [facility])    • Anxiety    • Depression    • Drug abuse ([address] [facility])    • Hepatitis C virus    • Psychiatric illness    • Sleep apnea      Past Surgical History:   Procedure Laterality Date   • CYST REMOVAL     • ELBOW SURGERY       No family history on file. Social History   Marital Status: Single   Occupation: none  Patient Pre-hospital Living Situation: undomiciled  Patient Pre-hospital Level of Mobility: independent, ambulatory  Patient Pre-hospital Diet Restrictions: none    No Known Allergies    Prior to Admission medications    Medication Sig Start Date End Date Taking?  Authorizing Provider   albuterol (VENTOLIN HFA) 90 mcg/act inhaler Inhale 2 puffs every 6 (six) hours as needed for wheezing  Patient not taking: Reported on 10/2/2023 9/25/19   Yaa Rodriguez MD   FLUoxetine (PROzac) 20 mg capsule Take 1 capsule (20 mg total) by mouth daily At 9am  Patient not taking: Reported on 7/16/2020 4/25/20 10/2/23  Venkata Gallardo PA-C   hydrOXYzine HCL (ATARAX) 25 mg tablet Take 1 tablet (25 mg total) by mouth every 6 (six) hours as needed (mild anxiety)  Patient not taking: Reported on 7/16/2020 4/24/20 10/2/23  Venkata Gallardo PA-C   risperiDONE (RisperDAL) 1 mg tablet Take 1 tablet (1 mg total) by mouth daily at bedtime  Patient not taking: Reported on 7/16/2020 4/24/20 10/2/23  Venkata Gallardo PA-C   traZODone (DESYREL) 100 mg tablet Take 1 tablet (100 mg total) by mouth daily at bedtime as needed for sleep  Patient not taking: Reported on 7/16/2020 4/24/20 10/2/23  Venkata Gallardo PA-C       Current Facility-Administered Medications   Medication Dose Route Frequency   • acetaminophen (TYLENOL) tablet 650 mg  650 mg Oral Q6H PRN   • amoxicillin-clavulanate (AUGMENTIN) 875-125 mg per tablet 1 tablet  1 tablet Oral Q12H [address]   • clonazePAM (KlonoPIN) tablet 2 mg  2 mg Oral Q6H PRN   • cloNIDine (CATAPRES) tablet 0.1 mg  0.1 mg Oral Q6H PRN   • enoxaparin (LOVENOX) subcutaneous injection 40 mg  40 mg Subcutaneous Daily   • gabapentin (NEURONTIN) capsule 300 mg  300 mg Oral Q8H PRN   • multivitamin-minerals (CENTRUM) tablet 1 tablet  1 tablet Oral Daily   • nicotine (NICODERM CQ) 21 mg/24 hr TD 24 hr patch 21 mg  21 mg Transdermal Daily   • ondansetron (ZOFRAN) injection 4 mg  4 mg Intravenous Q6H PRN   • transdermal buprenorphine (BUTRANS) 20 mcg/hr TD patch 20 mcg  20 mcg Transdermal Q7 Days       Continuous Infusions:          Objective     No intake or output data in the 24 hours ending 10/02/23 1516    Invasive Devices:   Peripheral IV 10/01/23 Right;Upper;Ventral (anterior) Arm (Active)   Site Assessment WDL 10/02/23 0900   Dressing Type Transparent 10/02/23 0900   Line Status Flushed;Passive disinfecting cap applied 10/02/23 0900   Dressing Status Clean;Dry; Intact 10/02/23 0900   Dressing Change Due 10/05/23 10/02/23 0900   Reason Not Rotated Not due 10/02/23 0900       Vitals   Vitals:    10/02/23 1308   BP: 115/70   TempSrc: Temporal   Pulse: 63   Resp: 16   Patient Position - Orthostatic VS: Lying   Temp: 98.2 °F (36.8 °C)       Physical Exam  Vitals and nursing note reviewed. Constitutional:       General: He is in acute distress (Patient is tearful, appears uncomfortable). Appearance: He is well-developed. He is not ill-appearing, toxic-appearing or diaphoretic. HENT:      Head: Normocephalic and atraumatic. Right Ear: External ear normal.      Left Ear: External ear normal.      Nose: Nose normal.      Mouth/Throat:      Mouth: Mucous membranes are moist.      Pharynx: No oropharyngeal exudate. Eyes:      General:         Left eye: No discharge. Extraocular Movements: Extraocular movements intact. Conjunctiva/sclera: Conjunctivae normal.      Pupils: Pupils are equal, round, and reactive to light. Cardiovascular:      Rate and Rhythm: Normal rate and regular rhythm. Heart sounds: Normal heart sounds. No murmur heard. No friction rub. No gallop. Pulmonary:      Effort: Pulmonary effort is normal. No respiratory distress. Breath sounds: Normal breath sounds. No stridor.  No wheezing or rales. Abdominal:      General: There is no distension. Palpations: Abdomen is soft. Tenderness: There is no abdominal tenderness. There is no guarding. Musculoskeletal:         General: No deformity. Normal range of motion. Cervical back: Normal range of motion. Comments: Bilatera upper and lower extremity wounds diffusely with multiple abscesses both pose and some previously opened with excoriations, no drainage, no erythema the upper extremities. Most prominent wound on lateral aspect of right lower extremity. See media tab and previous documentation for details. Mildly tender to palpation. No crepitus, fluctuance, induration. Nontender to palpation. Skin:     General: Skin is warm and dry. Findings: Erythema present. No rash. Neurological:      Mental Status: He is alert and oriented to person, place, and time. Motor: No weakness or tremor. Psychiatric:         Mood and Affect: Affect is tearful. DATA    EKG, Pathology, and Other Studies: I have personally reviewed pertinent reports. EKG: Sinus rhythm 68, QRS 86, QTc 448, no ST elevation or depression, no ectopy, no terminal R    Lab Results: I have personally reviewed pertinent reports.         CBC ETOH     Lab Results   Component Value Date    WBC 5.73 10/02/2023    RBC 4.28 10/02/2023    HGB 13.4 10/02/2023    HCT 42.1 10/02/2023    MCV 98 10/02/2023    MCH 31.3 10/02/2023    MCHC 31.8 10/02/2023    RDW 12.9 10/02/2023     10/02/2023    MPV 9.3 10/02/2023      Lab Results   Component Value Date    LACTICACID 1.3 10/01/2023      CMP UA         Component Value Date/Time    K 4.3 10/02/2023 0630     10/02/2023 0630    CO2 28 10/02/2023 0630    BUN 14 10/02/2023 0630    CREATININE 0.87 10/02/2023 0630         Component Value Date/Time    CALCIUM 8.3 (L) 10/02/2023 0630    ALKPHOS 64 10/02/2023 0630    AST 34 10/02/2023 0630    ALT 39 10/02/2023 0630      Lab Results   Component Value Date CLARITYU Cloudy 04/23/2020    COLORU Dk Yellow 04/23/2020    SPECGRAV 1.025 04/23/2020    PHUR 7.0 04/23/2020    GLUCOSEU Negative 04/23/2020    KETONESU Negative 04/23/2020    BLOODU Negative 04/23/2020    PROTEIN UA Trace (A) 04/23/2020    NITRITE Negative 04/23/2020    BILIRUBINUR Interference- unable to analyze (A) 04/23/2020    UROBILINOGEN 2.0 (A) 04/23/2020    LEUKOCYTESUR Large (A) 04/23/2020    WBCUA 4-10 (A) 04/23/2020    RBCUA None Seen 04/23/2020    BACTERIA Moderate (A) 04/23/2020    EPIS Occasional 04/23/2020        Liver Function Test: ASA     Lab Results   Component Value Date    TBILI 0.38 10/02/2023    ALKPHOS 64 10/02/2023    AST 34 10/02/2023    ALT 39 10/02/2023    TP 7.9 10/02/2023    ALB 3.0 (L) 10/02/2023      No results found for: "SALICYLATE"   Troponin APAP     Lab Results   Component Value Date    TROPONINI <0.01 09/23/2019      No results found for: "ACTMNPHEN"   VBG HCG     No results found for: "PHVEN", "TEE5LZV", "PO2VEN", "RVP6QUH", "Canary Baller", "B3PRRGNPB", "L4OBXMT"   No results found for: "HCGQUANT"   ABG Urine Drug Screen     No results found for: "PHART", "HKD4LUD", "PO2ART", "RQG7OJI", "BEART", "E8JNFAEMR", "O2HGB", "SOURC", "BAHMAN", "VTAC", "ACRATE", "INSPIREDAIR", "PEEP"   Lab Results   Component Value Date    AMPMETHUR Negative 09/09/2020    BARBTUR Negative 09/09/2020    BDZUR Negative 09/09/2020    COCAINEUR Positive (A) 09/09/2020    METHADONEUR Negative 09/09/2020    OPIATEUR Positive (A) 09/09/2020    PCPUR Negative 09/09/2020    THCUR Negative 09/09/2020    OXYCODONEUR Negative 09/09/2020      Lactate INR     Lab Results   Component Value Date    LACTICACID 1.3 10/01/2023      Lab Results   Component Value Date    INR 1.05 10/01/2023      PTT Protime     Lab Results   Component Value Date/Time    PTT 28 10/01/2023 05:45 PM      Lab Results   Component Value Date/Time    PROTIME 13.8 10/01/2023 05:45 PM      Hepatitis HIV     Lab Results   Component Value Date    HEPBSAG Non-reactive 09/22/2019    HEPCAB High Reactive (A) 09/22/2019      Lab Results   Component Value Date    [id number] Non-Reactive 09/22/2019    [id number] Non-Reactive 09/22/2019            Imaging Studies: I have personally reviewed pertinent reports. Counseling / Coordination of Care  Total floor / unit time spent today 45 minutes. Greater than 50% of total time was spent with the patient and / or family counseling and / or coordination of care. ** Please Note: This note has been constructed using a voice recognition system.  **

## 2023-10-02 NOTE — UTILIZATION REVIEW
Initial Clinical Review    Admission: Date/Time/Statement:   Admission Orders (From admission, onward)     Ordered        10/01/23 1911  INPATIENT ADMISSION  Once                      Orders Placed This Encounter   Procedures   • INPATIENT ADMISSION     Standing Status:   Standing     Number of Occurrences:   1     Order Specific Question:   Level of Care     Answer:   Med Surg [16]     Order Specific Question:   Estimated length of stay     Answer:   More than 2 Midnights     Order Specific Question:   Certification     Answer:   I certify that inpatient services are medically necessary for this patient for a duration of greater than two midnights. See H&P and MD Progress Notes for additional information about the patient's course of treatment. ED Arrival Information     Expected   -    Arrival   10/1/2023 16:48    Acuity   Urgent            Means of arrival   Walk-In    Escorted by   Self    Service   Hospitalist    Admission type   Emergency            Arrival complaint   leg problem           Chief Complaint   Patient presents with   • Abscess - Complicated     Large abscess with surrounding redness to right lateral leg, admits to injection heroin. Last used today at 1130. MRSA hx       Initial Presentation: 48 y.o. male presents to the ED from  Home with c/o multiple worsening wounds, mainly RLE with difficulty w/ ambulation. Notes he injects Fentanyl causing wounds. Is c/f Fentanyl withdrawal d/t dependence. Interested in stopping drug use. No other complaints. PMH: IV drug use with 10 bags Fentanyl q 4 hr. OUD, sleep apnea w/ CPAP. Om the ED temperature was 100.4F. Labs - no acute findings. Imaging - SQ edema in RLE with abscess. Treated with IV antibiotics and IV Flagyl. On exam multiple wounds in bilateral antecubital fossa, lower extremities, most prominent in right lateral calf, please refer to media -necrotic lesion with surrounding erythema, raised area.   He is admitted to INPATIENT status with Open wounds RLE - IV antibiotics, IV Flagyl, CT RLE, serial exam, follow VS, blood cultures. Multiple wounds - surgery consult. Fentanyl use disorder - Medical toxicology consult. Date: 10/2   Day 2:   Low calcium on labs. Afebrile. Remains on IV antibiotics and Flagyl. Used 1 dose IV Ativan this AM.     10/2 Surgery Consult - IV drug use in skin with BLE multiple extremity wounds. ? Need for debridement RLE wounds. Imaging shows no collection. No surgical intervention at this time. Wound is superficial with scabbing, no necrosis/eschar. Do not unroof these. Consult wound care team.  Pt is d/c today to [address] [facility] Level 4 Inpatient Medical Detox unit.         [address] [place] [address] Road Name 10/02/23 07:38:14 10/02/23 0241 10/02/23 0100 10/01/23 20:57:40 10/01/23 1921   Clinical Opiate Withdrawal Scale   Pulse 55  -DI 77  -HB 65  -HB 72  -DI 75  -CO   Resting Pulse Rate: Measured After Patient is Sitting or Lying for One Minute -- 0  -HB -- -- 0  -CO   GI Upset: Over Last Half Hour -- 0  -HB -- -- 0  -CO   Sweating: Over Past Half Hour Not Accounted for by Room Temperature of Patient Activity -- 2  -HB -- -- 2  -CO   Tremor: Observation of Outstretched Hands -- 2  -HB -- -- 1  -CO   Restlessness: Observation During Assessment -- 1  -HB -- -- 0  -CO   Yawning: Observation During Assessment -- 0  -HB -- -- 0  -CO   Pupil Size -- 0  -HB -- -- 0  -CO   Anxiety and Irritability -- 1  -HB -- -- 0  -CO   Bone or Joint Aches: If Patient was Having Pain Previously, Only the Additional Component Attributed to Opiate Withdrawal is Scored -- 0  -HB -- -- 0  -CO   Gooseflesh Skin -- 0  -HB -- -- 0  -CO   Runny Nose or Tearing: Not Accounted for by Cold Symptoms or Allergies -- 0  -HB -- -- 0  -CO   Clinical Opiate Withdrawal Scale Total Score -- 6  -HB -- -- 3  -CO   Row Name 10/01/23 1853 10/01/23 1812 10/01/23 1656     Clinical Opiate Withdrawal Scale   Pulse 63  -CO 69  -CO 99  -EP     Resting Pulse Rate: Measured After Patient is Sitting or Lying for One Minute -- 0  -CO --     GI Upset: Over Last Half Hour -- 0  -CO --     Sweating: Over Past Half Hour Not Accounted for by Room Temperature of Patient Activity -- 1  -CO --     Tremor: Observation of Outstretched Hands -- 1  -CO --     Restlessness: Observation During Assessment -- 0  -CO --     Yawning: Observation During Assessment -- 0  -CO --     Pupil Size -- 0  -CO --     Anxiety and Irritability -- 0  -CO --     Bone or Joint Aches: If Patient was Having Pain Previously, Only the Additional Component Attributed to Opiate Withdrawal is Scored -- 0  -CO --     Gooseflesh Skin -- 0  -CO --     Runny Nose or Tearing: Not Accounted for by Cold Symptoms or Allergies -- 0  -CO --     Clinical Opiate Withdrawal Scale Total Score -- 2  -CO --         ED Triage Vitals   Temperature Pulse Respirations Blood Pressure SpO2   10/01/23 1656 10/01/23 1656 10/01/23 1656 10/01/23 1656 10/01/23 1656   100.4 °F (38 °C) 99 20 146/85 98 %      Temp Source Heart Rate Source Patient Position - Orthostatic VS BP Location FiO2 (%)   10/01/23 1656 10/01/23 1656 10/01/23 1853 10/01/23 1853 --   Oral Monitor Sitting Right arm       Pain Score       10/01/23 1656       9          Wt Readings from Last 1 Encounters:   10/01/23 72.7 kg (160 lb 4.4 oz)     Additional Vital Sign  10/02/23 07:38:14 98.1 °F (36.7 °C) 55 -- 119/79 92 96 % -- --   10/02/23 0241 98.3 °F (36.8 °C) 77 18 111/79 90 95 % None (Room air) Lying   10/02/23 0100 -- 65 16 -- -- 97 % -- --   10/01/23 20:57:40 98.9 °F (37.2 °C) 72 18 130/96 107 97 % None (Room air) Lying   10/01/23 1921 -- 75 -- -- -- -- -- --   10/01/23 1853 -- 63 19 121/71 -- 96 % None (Room air) Sitting     Pertinent Labs/Diagnostic Test Results:     10/1 ECG - Normal sinus rhythm    CT lower extremity w contrast right   Final Result by Ju Cole MD (10/02 2066)      1.  Subcutaneous edema in the posteromedial aspect of right proximal lower leg with a small superficial fluid collection/abscess measuring 1.1 x 0.8 x 0.6 cm.      2. Skin ulcer in the lateral aspect of proximal right lower leg with mild subcutaneous edema. No localized fluid collection. XR tibia fibula 2 views RIGHT      No acute osseous abnormality. XR tibia fibula 2 views LEFT      No acute osseous abnormality. Results from last 7 days   Lab Units 10/02/23  0630 10/01/23  1745   WBC Thousand/uL 5.73 6.85   HEMOGLOBIN g/dL 13.4 14.6   HEMATOCRIT % 42.1 45.2   PLATELETS Thousands/uL 189 224   NEUTROS ABS Thousands/µL  --  4.07         Results from last 7 days   Lab Units 10/02/23  0630 10/01/23  1745   SODIUM mmol/L 135 139   POTASSIUM mmol/L 4.3 3.9   CHLORIDE mmol/L 103 104   CO2 mmol/L 28 31   ANION GAP mmol/L 4 4   BUN mg/dL 14 16   CREATININE mg/dL 0.87 0.93   EGFR ml/min/1.73sq m 99 94   CALCIUM mg/dL 8.3* 9.0     Results from last 7 days   Lab Units 10/02/23  0630 10/01/23  1745   AST U/L 34 39   ALT U/L 39 48   ALK PHOS U/L 64 75   TOTAL PROTEIN g/dL 7.9 9.2*   ALBUMIN g/dL 3.0* 3.5   TOTAL BILIRUBIN mg/dL 0.38 0.41         Results from last 7 days   Lab Units 10/02/23  0630 10/01/23  1745   GLUCOSE RANDOM mg/dL 99 80     Results from last 7 days   Lab Units 10/01/23  1745   PROTIME seconds 13.8   INR  1.05   PTT seconds 28         Results from last 7 days   Lab Units 10/01/23  1745   PROCALCITONIN ng/ml 0.10     Results from last 7 days   Lab Units 10/01/23  1745   LACTIC ACID mmol/L 1.3     Results from last 7 days   Lab Units 10/01/23  1750 10/01/23  1745   BLOOD CULTURE  Received in Microbiology Lab. Culture in Progress. Received in Microbiology Lab. Culture in Progress.      ED Treatment:   Medication Administration from 10/01/2023 1648 to 10/01/2023 2053       Date/Time Order Dose Route Action     10/01/2023 1849 EDT vancomycin (VANCOCIN) IVPB (premix in dextrose) 1,000 mg 200 mL 1,000 mg Intravenous New Bag     10/01/2023 2010 EDT cefepime (MAXIPIME) IVPB (premix in dextrose) 2,000 mg 50 mL 2,000 mg Intravenous New Bag     10/01/2023 2038 EDT metroNIDAZOLE (FLAGYL) tablet 500 mg 500 mg Oral Given        Past Medical History:   Diagnosis Date   • Addiction to drug [facility])    • Anxiety    • Depression    • Drug abuse ([facility])    • Hepatitis C virus    • Psychiatric illness    • Sleep apnea      Present on Admission:  • Sleep apnea      Admitting Diagnosis: Substance abuse ([facility]) [F19.10]  Abscess [L02.91]  Wound infection [T14. 8XXA, L08.9]  Multiple open wounds of lower leg, left, initial encounter [S81.802A]  Multiple open wounds of right lower leg [S81.801A]  Age/Sex: 48 y.o. male  Admission Orders:  Scheduled Medications:  cefepime, 2,000 mg, Intravenous, Q12H  enoxaparin, 40 mg, Subcutaneous, Daily  metroNIDAZOLE, 500 mg, Oral, Q8H RADHA  nicotine, 1 patch, Transdermal, Daily  vancomycin, 15 mg/kg, Intravenous, Q12H      Continuous IV Infusions:     PRN Meds:  albuterol, 2 puff, Inhalation, Q6H PRN  hydrOXYzine HCL, 25 mg, Oral, Q6H PRN  LORazepam, 1 mg, Intravenous, Q4H PRN - x1 10/2  ondansetron, 4 mg, Intravenous, Q6H PRN  traZODone, 100 mg, Oral, HS PRN    Vanco  Consult would care   OOB as paula  CPAP  CONSULT TO CERTIFIED   IP CONSULT TO ACUTE CARE SURGERY  IP CONSULT TO PHARMACY  IP CONSULT TO TOXICOLOGY    Network Utilization Review Department  ATTENTION: Please call with any questions or concerns to [phone] and carefully listen to the prompts so that you are directed to the right person. All voicemails are confidential.  Olinda [place] all requests for admission clinical reviews, approved or denied determinations and any other requests to dedicated fax number below belonging to the campus where the patient is receiving treatment.  List of dedicated fax numbers for the Facilities:  [place] DENIALS (Administrative/Medical Necessity) [phone] [address] [facility] (Maternity/NICU/Pediatrics) [phone] [address] [facility] [phone]   [facility] [address] [facility] [facility] [phone]   [address] [address] [place] [address] [phone]   [address] [place] [address] [phone]

## 2023-10-02 NOTE — PLAN OF CARE
Problem: SUBSTANCE USE/ABUSE  Goal: By discharge, will develop insight into their chemical dependency and sustain motivation to continue in recovery  Description: INTERVENTIONS:  - Attends all daily group sessions and scheduled AA groups  - Actively practices coping skills through participation in the therapeutic community and adherence to program rules  - Reviews and completes assignments from individual treatment plan  - Assist patient development of understanding of their personal cycle of addiction and relapse triggers  Outcome: Not Progressing     Problem: SUBSTANCE USE/ABUSE  Goal: By discharge, patient will have ongoing treatment plan addressing chemical dependency  Description: INTERVENTIONS:  - Assist patient with resources and/or appointments for ongoing recovery based living  Outcome: Not Progressing     Problem: SKIN/TISSUE INTEGRITY - ADULT  Goal: Incision(s), wounds(s) or drain site(s) healing without S/S of infection  Description: INTERVENTIONS  - Assess and document dressing, incision, wound bed, drain sites and surrounding tissue  - Provide patient and family education  -   Outcome: Not Progressing

## 2023-10-02 NOTE — PLAN OF CARE
Problem: PAIN - ADULT  Goal: Verbalizes/displays adequate comfort level or baseline comfort level  Description: Interventions:  - Encourage patient to monitor pain and request assistance  - Assess pain using appropriate pain scale  - Administer analgesics based on type and severity of pain and evaluate response  - Implement non-pharmacological measures as appropriate and evaluate response  - Consider cultural and social influences on pain and pain management  - Notify physician/advanced practitioner if interventions unsuccessful or patient reports new pain  Outcome: Progressing     Problem: INFECTION - ADULT  Goal: Absence or prevention of progression during hospitalization  Description: INTERVENTIONS:  - Assess and monitor for signs and symptoms of infection  - Monitor lab/diagnostic results  - Monitor all insertion sites, i.e. indwelling lines, tubes, and drains  - Monitor endotracheal if appropriate and nasal secretions for changes in amount and color  - [place] appropriate cooling/warming therapies per order  - Administer medications as ordered  - Instruct and encourage patient and family to use good hand hygiene technique  - Identify and instruct in appropriate isolation precautions for identified infection/condition   Outcome: Progressing     Problem: SAFETY ADULT  Goal: Maintain or return to baseline ADL function  Description: INTERVENTIONS:  -  Assess patient's ability to carry out ADLs; assess patient's baseline for ADL function and identify physical deficits which impact ability to perform ADLs (bathing, care of mouth/teeth, toileting, grooming, dressing, etc.)  - Assess/evaluate cause of self-care deficits   - Assess range of motion  - Assess patient's mobility; develop plan if impaired  - Assess patient's need for assistive devices and provide as appropriate  - Encourage maximum independence but intervene and supervise when necessary  - Involve family in performance of ADLs  - Assess for home care needs following discharge   - Consider OT consult to assist with ADL evaluation and planning for discharge  - Provide patient education as appropriate  Outcome: Progressing  Goal: Maintains/Returns to pre admission functional level  Description: INTERVENTIONS:  - Perform BMAT or MOVE assessment daily.   - Set and communicate daily mobility goal to care team and patient/family/caregiver. - Collaborate with rehabilitation services on mobility goals if consulted  - Reposition patient every 2 hours. - Dangle patient 3 times a day  - Stand patient 3 times a day  - Ambulate patient 3 times a day  - Out of bed to chair 3 times a day   - Out of bed for meals   - Out of bed for toileting  - Record patient progress and toleration of activity level   Outcome: Progressing     Problem: DISCHARGE PLANNING  Goal: Discharge to home or other facility with appropriate resources  Description: INTERVENTIONS:  - Identify barriers to discharge w/patient and caregiver  - Arrange for needed discharge resources and transportation as appropriate  - Identify discharge learning needs (meds, wound care, etc.)  - Refer to Case Management Department for coordinating discharge planning if the patient needs post-hospital services based on physician/advanced practitioner order or complex needs related to functional status, cognitive ability, or social support system  Outcome: Progressing     Problem: Knowledge Deficit  Goal: Patient/family/caregiver demonstrates understanding of disease process, treatment plan, medications, and discharge instructions  Description: Complete learning assessment and assess knowledge base.   Interventions:  - Provide teaching at level of understanding  - Provide teaching via preferred learning methods  Outcome: Progressing     Problem: SKIN/TISSUE INTEGRITY - ADULT  Goal: Incision(s), wounds(s) or drain site(s) healing without S/S of infection  Description: INTERVENTIONS  - Assess and document dressing, incision, wound bed, drain sites and surrounding tissue  - Provide patient and family education  - Perform skin care/dressing changes as ordered  Outcome: Progressing     Problem: SUBSTANCE USE/ABUSE  Goal: Will have no detox symptoms and will verbalize plan for changing substance-related behavior  Description: INTERVENTIONS:  - Monitor physical status and assess for symptoms of withdrawal  - Administer medication as ordered  - Provide emotional support with 1 on 1 interaction with staff  - Encourage recovery focused program/ addiction education  - Assess for verbalization of changing behaviors related to substance abuse  - Initiate consults and referrals as appropriate (Case Management, Spiritual Care, etc.)  Outcome: Progressing

## 2023-10-02 NOTE — ASSESSMENT & PLAN NOTE
This is a 47 yo male with history of IVDU using Fentanyl 10 bags every 4 hours, who presents with multiple wounds at site of injection including bilateral antecubital fossa, lateral calves, with the most concerning wound on the right lateral calf which appears necrotic with surrounding area of erythema  · Without systemic manifestations of infection  · Discussed with general surgery, appreciate input  · I will order a CT of right lower extremity  · Proceed with broad-spectrum antibiotics for now with cefepime, vancomycin, Flagyl  · Serial exams  · Monitor CBC, vital signs

## 2023-10-02 NOTE — PLAN OF CARE
Problem: PAIN - ADULT  Goal: Verbalizes/displays adequate comfort level or baseline comfort level  Description: Interventions:  - Encourage patient to monitor pain and request assistance  - Assess pain using appropriate pain scale  - Administer analgesics based on type and severity of pain and evaluate response  - Implement non-pharmacological measures as appropriate and evaluate response  - Consider cultural and social influences on pain and pain management  - Notify physician/advanced practitioner if interventions unsuccessful or patient reports new pain  10/2/2023 0805 by Enid Sales RN  Outcome: Progressing  10/2/2023 0803 by Enid Sales RN  Outcome: Progressing     Problem: SAFETY ADULT  Goal: Maintain or return to baseline ADL function  Description: INTERVENTIONS:  -  Assess patient's ability to carry out ADLs; assess patient's baseline for ADL function and identify physical deficits which impact ability to perform ADLs (bathing, care of mouth/teeth, toileting, grooming, dressing, etc.)  - Assess/evaluate cause of self-care deficits   - Assess range of motion  - Assess patient's mobility; develop plan if impaired  - Assess patient's need for assistive devices and provide as appropriate  - Encourage maximum independence but intervene and supervise when necessary  - Involve family in performance of ADLs  - Assess for home care needs following discharge   - Consider OT consult to assist with ADL evaluation and planning for discharge  - Provide patient education as appropriate  Outcome: Progressing  Goal: Maintains/Returns to pre admission functional level  Description: INTERVENTIONS:  - Perform BMAT or MOVE assessment daily.   - Set and communicate daily mobility goal to care team and patient/family/caregiver. - Collaborate with rehabilitation services on mobility goals if consulted  - Reposition patient every 2 hours.   - Dangle patient 3 times a day  - Stand patient 3 times a day  - Ambulate patient 3 times a day  - Out of bed to chair 3 times a day   - Out of bed for meals   - Out of bed for toileting  - Record patient progress and toleration of activity level   10/2/2023 0805 by Linda Lewis RN  Outcome: Progressing  10/2/2023 0803 by Linda Lewis RN  Outcome: Progressing     Problem: Knowledge Deficit  Goal: Patient/family/caregiver demonstrates understanding of disease process, treatment plan, medications, and discharge instructions  Description: Complete learning assessment and assess knowledge base.   Interventions:  - Provide teaching at level of understanding  - Provide teaching via preferred learning methods  Outcome: Progressing     Problem: SUBSTANCE USE/ABUSE  Goal: Will have no detox symptoms and will verbalize plan for changing substance-related behavior  Description: INTERVENTIONS:  - Monitor physical status and assess for symptoms of withdrawal  - Administer medication as ordered  - Provide emotional support with 1 on 1 interaction with staff  - Encourage recovery focused program/ addiction education  - Assess for verbalization of changing behaviors related to substance abuse  - Initiate consults and referrals as appropriate (Case Management, Spiritual Care, etc.)  Outcome: Progressing

## 2023-10-02 NOTE — ASSESSMENT & PLAN NOTE
· Patient uses approximately 10 bags of heroin daily IVDA, last use 10:30 AM on 10/1  · See above for management of withdrawal symptoms  · Encourage cessation  · Consult case management, appreciate recommendations

## 2023-10-02 NOTE — ASSESSMENT & PLAN NOTE
• Uses 10 bags of heroin last used 10:30 AM on 10/1/2023.   • Current withdrawal symptoms:  Chills, nausea, poor appetite  • Butrans patch  • Start microinduction pathway in 24 hours  • Supportive care and adjunct medications as needed  • Patient is requesting Sublocade and this can be continued once withdrawal has been stabilized

## 2023-10-02 NOTE — DISCHARGE SUMMARY
[facility]  Discharge- Víctor Ivory 1970, 48 y.o. male MRN: [id number]  Unit/Bed#: [place] [address] -01 Encounter: [id number]  Primary Care Provider: Tahira Perkins MD   Date and time admitted to hospital: 10/1/2023  5:05 PM    Admitting Provider:  Calvin Zapien MD  Discharge Provider:  Radha Francois DO  Admission Date: 10/1/2023       Discharge Date: 10/02/23   LOS: 1  Primary Care Physician at Discharge: Tahira Perkins [phone]    DISCHARGE DIAGNOSES  * Open leg wound, right, subsequent encounter  Assessment & Plan  History of IVDA last use yesterday along with mood disorder not on medications anymore who presents for worsening calf/leg infections  · Small collection noted on CT imaging discussed with surgery who recommends Augmentin for 7 days    Multiple wounds  Assessment & Plan  · Multiple wounds due to IV drug abuse    Heroin abuse ([address])  Assessment & Plan  · Uses 10 bags of heroin last used 10:30 AM yesterday. · He has symptoms of withdrawal  · Case was discussed with toxicology. Patient being transferred to detox unit today for stabilization    Sleep apnea  Assessment & Plan  · Continue CPAP at bedtime    REASON FOR ADMISSION  Abscess - Complicated (Large abscess with surrounding redness to right lateral leg, admits to injection heroin. Last used today at 1130. MRSA hx)    HOSPITAL COURSE:  Víctor Ivory is a 48 y.o. male with a history of mood disorder FALLON and heroin abuse who presented to the hospital for leg abscesses/pain. He injects 10 bags of heroin a day last used 1130 yesterday. In the ED he was seen by surgery. There was a small abscess which is recommended Augmentin. There is no need for any surgical I&D. Unfortunately he is starting to withdrawal.  The case was discussed with toxicology who has agreed to accept the patient on the detox unit. Please see problem list listed above.     CONSULTING PROVIDERS   CONSULT TO CERTIFIED   IP CONSULT TO ACUTE CARE SURGERY  IP CONSULT TO PHARMACY  IP CONSULT TO TOXICOLOGY    PROCEDURES PERFORMED  * No surgery found *    RADIOLOGY RESULTS  XR tibia fibula 2 views LEFT    Result Date: 10/2/2023  Impression: No acute osseous abnormality. Workstation performed: [id number]     XR tibia fibula 2 views RIGHT    Result Date: 10/2/2023  Impression: No acute osseous abnormality. Workstation performed: [id number]     CT lower extremity w contrast right    Result Date: 10/2/2023  Impression: 1. Subcutaneous edema in the posteromedial aspect of right proximal lower leg with a small superficial fluid collection/abscess measuring 1.1 x 0.8 x 0.6 cm. 2. Skin ulcer in the lateral aspect of proximal right lower leg with mild subcutaneous edema. No localized fluid collection. Workstation performed: [id number]       LABS  Results from last 7 days   Lab Units 10/02/23  0630 10/01/23  1745   WBC Thousand/uL 5.73 6.85   HEMOGLOBIN g/dL 13.4 14.6   HEMATOCRIT % 42.1 45.2   MCV fL 98 99*   TOTAL NEUT ABS Thousand/uL 2.75  --    PLATELETS Thousands/uL 189 224   INR   --  1.05     Results from last 7 days   Lab Units 10/02/23  0630 10/01/23  1745   SODIUM mmol/L 135 139   POTASSIUM mmol/L 4.3 3.9   CHLORIDE mmol/L 103 104   CO2 mmol/L 28 31   BUN mg/dL 14 16   CREATININE mg/dL 0.87 0.93   CALCIUM mg/dL 8.3* 9.0   ALBUMIN g/dL 3.0* 3.5   TOTAL BILIRUBIN mg/dL 0.38 0.41   ALK PHOS U/L 64 75   ALT U/L 39 48   AST U/L 34 39   EGFR ml/min/1.73sq m 99 94   GLUCOSE RANDOM mg/dL 99 80                                  Results from last 7 days   Lab Units 10/01/23  1745   LACTIC ACID mmol/L 1.3   PROCALCITONIN ng/ml 0.10     Cultures:          Results from last 7 days   Lab Units 10/01/23  1750 10/01/23  1745   BLOOD CULTURE  Received in Microbiology Lab. Culture in Progress. Received in Microbiology Lab. Culture in Progress.                  DISCHARGE DAY VISIT AND PHYSICAL EXAM:  Subjective: Patient seen and examined. Wants to go to detox    Vitals:   Blood Pressure: 119/79 (10/02/23 0738)  Pulse: 55 (10/02/23 0738)  Temperature: 98.1 °F (36.7 °C) (10/02/23 0738)  Temp Source: Oral (10/02/23 0241)  Respirations: 18 (10/02/23 0241)  Height: 5' 5" (165.1 cm) (10/01/23 1656)  Weight - Scale: 72.7 kg (160 lb 4.4 oz) (10/01/23 2057)  SpO2: 96 % (10/02/23 0738)    Physical Exam  Vitals reviewed. Constitutional:       General: He is not in acute distress. HENT:      Head: Atraumatic. Eyes:      Extraocular Movements: Extraocular movements intact. Cardiovascular:      Rate and Rhythm: Regular rhythm. Heart sounds: Normal heart sounds. Pulmonary:      Effort: Pulmonary effort is normal.      Breath sounds: No wheezing. Abdominal:      General: Bowel sounds are normal.      Palpations: Abdomen is soft. Tenderness: There is no abdominal tenderness. There is no rebound. Musculoskeletal:         General: Tenderness (Thighs) present. No swelling. Skin:     General: Skin is warm. Findings: Wound (Calf/leg) present. Neurological:      General: No focal deficit present. Mental Status: He is alert. Cranial Nerves: No cranial nerve deficit. Psychiatric:         Mood and Affect: Mood normal.       Planned Re-admission: Yes  Discharge Disposition: [facility] - Acute Care  Facility: [facility] for detox unit    Test Results Pending at Discharge:   Pending Labs     Order Current Status    Blood culture #1 Preliminary result    Blood culture #2 Preliminary result      Incidental findings:     Medications   · Discharge Medication List: See after visit summary for reconciled discharge medications. Diet restrictions: Regular diet  Activity restrictions: No strenuous activity  Discharge Condition: stable    Outpatient Follow-Up and Discharge Instructions  See after visit summary section titled Discharge Instructions for information provided to patient and family.       Code Status: Level 1 - Full Code  Discharge Statement   I spent 35 minutes discharging the patient. This time was spent on the day of discharge. Greater than 50% of total time was spent with the patient and / or family counseling and / or coordination of care. ** Please Note: This note has been constructed using a voice recognition system.  **

## 2023-10-03 LAB
ALBUMIN SERPL BCP-MCNC: 3 G/DL (ref 3.5–5)
ALP SERPL-CCNC: 64 U/L (ref 34–104)
ALT SERPL W P-5'-P-CCNC: 36 U/L (ref 7–52)
ANION GAP SERPL CALCULATED.3IONS-SCNC: 6 MMOL/L
AST SERPL W P-5'-P-CCNC: 36 U/L (ref 13–39)
BILIRUB SERPL-MCNC: 0.34 MG/DL (ref 0.2–1)
BUN SERPL-MCNC: 13 MG/DL (ref 5–25)
CALCIUM ALBUM COR SERPL-MCNC: 9.5 MG/DL (ref 8.3–10.1)
CALCIUM SERPL-MCNC: 8.7 MG/DL (ref 8.4–10.2)
CHLORIDE SERPL-SCNC: 105 MMOL/L (ref 96–108)
CO2 SERPL-SCNC: 24 MMOL/L (ref 21–32)
CREAT SERPL-MCNC: 0.85 MG/DL (ref 0.6–1.3)
ERYTHROCYTE [DISTWIDTH] IN BLOOD BY AUTOMATED COUNT: 12.6 % (ref 11.6–15.1)
GFR SERPL CREATININE-BSD FRML MDRD: 100 ML/MIN/1.73SQ M
GLUCOSE SERPL-MCNC: 92 MG/DL (ref 65–140)
HAV IGM SER QL: ABNORMAL
HBV CORE IGM SER QL: ABNORMAL
HBV SURFACE AG SER QL: ABNORMAL
HCT VFR BLD AUTO: 44.6 % (ref 36.5–49.3)
HCV AB SER QL: REACTIVE
HGB BLD-MCNC: 14.6 G/DL (ref 12–17)
HIV 1+2 AB+HIV1 P24 AG SERPL QL IA: NORMAL
HIV1 P24 AG SER QL: NORMAL
MAGNESIUM SERPL-MCNC: 2.1 MG/DL (ref 1.9–2.7)
MCH RBC QN AUTO: 31.7 PG (ref 26.8–34.3)
MCHC RBC AUTO-ENTMCNC: 32.7 G/DL (ref 31.4–37.4)
MCV RBC AUTO: 97 FL (ref 82–98)
PLATELET # BLD AUTO: 191 THOUSANDS/UL (ref 149–390)
PMV BLD AUTO: 10 FL (ref 8.9–12.7)
POTASSIUM SERPL-SCNC: 4.1 MMOL/L (ref 3.5–5.3)
PROT SERPL-MCNC: 7.5 G/DL (ref 6.4–8.4)
RBC # BLD AUTO: 4.6 MILLION/UL (ref 3.88–5.62)
SODIUM SERPL-SCNC: 135 MMOL/L (ref 135–147)
WBC # BLD AUTO: 5.66 THOUSAND/UL (ref 4.31–10.16)

## 2023-10-03 PROCEDURE — 83735 ASSAY OF MAGNESIUM: CPT

## 2023-10-03 PROCEDURE — 80053 COMPREHEN METABOLIC PANEL: CPT

## 2023-10-03 PROCEDURE — 85027 COMPLETE CBC AUTOMATED: CPT

## 2023-10-03 PROCEDURE — 80074 ACUTE HEPATITIS PANEL: CPT

## 2023-10-03 PROCEDURE — 87806 HIV AG W/HIV1&2 ANTB W/OPTIC: CPT

## 2023-10-03 PROCEDURE — 99233 SBSQ HOSP IP/OBS HIGH 50: CPT | Performed by: EMERGENCY MEDICINE

## 2023-10-03 RX ORDER — DEXMEDETOMIDINE HYDROCHLORIDE 4 UG/ML
.1-1.5 INJECTION, SOLUTION INTRAVENOUS
Status: DISCONTINUED | OUTPATIENT
Start: 2023-10-03 | End: 2023-10-04

## 2023-10-03 RX ORDER — DIAZEPAM 5 MG/ML
10 INJECTION, SOLUTION INTRAMUSCULAR; INTRAVENOUS ONCE
Status: COMPLETED | OUTPATIENT
Start: 2023-10-03 | End: 2023-10-03

## 2023-10-03 RX ORDER — BUPRENORPHINE 2 MG/1
0.5 TABLET SUBLINGUAL
Status: DISCONTINUED | OUTPATIENT
Start: 2023-10-03 | End: 2023-10-03

## 2023-10-03 RX ORDER — BUPRENORPHINE AND NALOXONE 8; 2 MG/1; MG/1
8 FILM, SOLUBLE BUCCAL; SUBLINGUAL 2 TIMES DAILY
Status: DISCONTINUED | OUTPATIENT
Start: 2023-10-03 | End: 2023-10-05 | Stop reason: HOSPADM

## 2023-10-03 RX ORDER — BUPRENORPHINE AND NALOXONE 2; .5 MG/1; MG/1
2 FILM, SOLUBLE BUCCAL; SUBLINGUAL
Status: DISCONTINUED | OUTPATIENT
Start: 2023-10-03 | End: 2023-10-03

## 2023-10-03 RX ORDER — HALOPERIDOL 5 MG/ML
5 INJECTION INTRAMUSCULAR ONCE
Status: COMPLETED | OUTPATIENT
Start: 2023-10-03 | End: 2023-10-03

## 2023-10-03 RX ADMIN — Medication 0.5 MG: at 15:36

## 2023-10-03 RX ADMIN — DEXMEDETOMIDINE 1.3 MCG/KG/HR: 100 INJECTION, SOLUTION, CONCENTRATE INTRAVENOUS at 23:53

## 2023-10-03 RX ADMIN — GABAPENTIN 300 MG: 300 CAPSULE ORAL at 07:52

## 2023-10-03 RX ADMIN — BUPRENORPHINE AND NALOXONE 2 MG: 2; .5 FILM BUCCAL; SUBLINGUAL at 20:09

## 2023-10-03 RX ADMIN — Medication 0.5 MG: at 13:50

## 2023-10-03 RX ADMIN — AMOXICILLIN AND CLAVULANATE POTASSIUM 1 TABLET: 875; 125 TABLET, FILM COATED ORAL at 23:22

## 2023-10-03 RX ADMIN — HALOPERIDOL LACTATE 5 MG: 5 INJECTION, SOLUTION INTRAMUSCULAR at 20:13

## 2023-10-03 RX ADMIN — ONDANSETRON 4 MG: 2 INJECTION INTRAMUSCULAR; INTRAVENOUS at 06:34

## 2023-10-03 RX ADMIN — ONDANSETRON 4 MG: 2 INJECTION INTRAMUSCULAR; INTRAVENOUS at 15:37

## 2023-10-03 RX ADMIN — DEXMEDETOMIDINE 1 MCG/KG/HR: 100 INJECTION, SOLUTION, CONCENTRATE INTRAVENOUS at 19:28

## 2023-10-03 RX ADMIN — BUPRENORPHINE AND NALOXONE 2 MG: 2; .5 FILM BUCCAL; SUBLINGUAL at 18:34

## 2023-10-03 RX ADMIN — MULTIPLE VITAMINS W/ MINERALS TAB 1 TABLET: TAB ORAL at 08:07

## 2023-10-03 RX ADMIN — Medication 0.5 MG: at 12:00

## 2023-10-03 RX ADMIN — DIAZEPAM 10 MG: 10 INJECTION, SOLUTION INTRAMUSCULAR; INTRAVENOUS at 17:36

## 2023-10-03 RX ADMIN — CLONAZEPAM 2 MG: 1 TABLET ORAL at 15:39

## 2023-10-03 RX ADMIN — AMOXICILLIN AND CLAVULANATE POTASSIUM 1 TABLET: 875; 125 TABLET, FILM COATED ORAL at 00:10

## 2023-10-03 RX ADMIN — ACETAMINOPHEN 650 MG: 325 TABLET ORAL at 11:15

## 2023-10-03 RX ADMIN — BUPRENORPHINE AND NALOXONE 8 MG: 8; 2 FILM BUCCAL; SUBLINGUAL at 21:00

## 2023-10-03 RX ADMIN — ENOXAPARIN SODIUM 40 MG: 40 INJECTION SUBCUTANEOUS at 08:07

## 2023-10-03 RX ADMIN — BUPRENORPHINE AND NALOXONE 2 MG: 2; .5 FILM BUCCAL; SUBLINGUAL at 16:47

## 2023-10-03 RX ADMIN — DEXMEDETOMIDINE 0.2 MCG/KG/HR: 100 INJECTION, SOLUTION, CONCENTRATE INTRAVENOUS at 18:07

## 2023-10-03 RX ADMIN — NICOTINE 21 MG: 21 PATCH, EXTENDED RELEASE TRANSDERMAL at 08:07

## 2023-10-03 RX ADMIN — AMOXICILLIN AND CLAVULANATE POTASSIUM 1 TABLET: 875; 125 TABLET, FILM COATED ORAL at 11:15

## 2023-10-03 RX ADMIN — GABAPENTIN 300 MG: 300 CAPSULE ORAL at 15:21

## 2023-10-03 NOTE — ASSESSMENT & PLAN NOTE
· Patient uses approximately 10 bags of heroin daily IVDA, last use 10:30 AM on 10/1  · See above for management of withdrawal symptoms  · Encourage cessation  · Consult case management, appreciate recommendations  · Interested in [facility] for discharge

## 2023-10-03 NOTE — PLAN OF CARE
Problem: SUBSTANCE USE/ABUSE  Goal: By discharge, will develop insight into their chemical dependency and sustain motivation to continue in recovery  Description: INTERVENTIONS:  - Attends all daily group sessions and scheduled AA groups  - Actively practices coping skills through participation in the therapeutic community and adherence to program rules  - Reviews and completes assignments from individual treatment plan  - Assist patient development of understanding of their personal cycle of addiction and relapse triggers  Outcome: Progressing  Goal: By discharge, patient will have ongoing treatment plan addressing chemical dependency  Description: INTERVENTIONS:  - Assist patient with resources and/or appointments for ongoing recovery based living  Outcome: Progressing    Skin Care:  -Avoid use of baby powder, tape, friction and shearing, hot water or constrictive clothing  Outcome: Progressing  Goal: Incision(s), wounds(s) or drain site(s) healing without S/S of infection  Description: INTERVENTIONS  - Assess and document dressing, incision, wound bed, drain sites and surrounding tissue  Outcome: Progressing  Goal: Pressure injury heals and does not worsen  Description: Interventions:  - Implement low air loss mattress or specialty surface (Criteria met)  - Apply silicone foam dressing  - Consider nutrition services referral as needed  Outcome: Progressing     Problem: Nutrition/Hydration-ADULT  Goal: Nutrient/Hydration intake appropriate for improving, restoring or maintaining nutritional needs  Description: Monitor and assess patient's nutrition/hydration status for malnutrition. Collaborate with interdisciplinary team and initiate plan and interventions as ordered. Monitor patient's weight and dietary intake as ordered or per policy. Utilize nutrition screening tool and intervene as necessary. Determine patient's food preferences and provide high-protein, high-caloric foods as appropriate. INTERVENTIONS:  - Monitor oral intake, urinary output, labs, and treatment plans  - Assess nutrition and hydration status and recommend course of action  - Evaluate amount of meals eaten  - Assist patient with eating if necessary   - Allow adequate time for meals  - Recommend/ encourage appropriate diets, oral nutritional supplements, and vitamin/mineral supplements  - Order, calculate, and assess calorie counts as needed  - Recommend, monitor, and adjust tube feedings and TPN/PPN based on assessed needs  - Assess need for intravenous fluids  - Provide specific nutrition/hydration education as appropriate  - Include patient/family/caregiver in decisions related to nutrition  Outcome: Progressing

## 2023-10-03 NOTE — PROGRESS NOTES
.  Progress Note - Medical Toxicology    Nevin Son 48 y.o. male MRN: [id number]  Unit/Bed#: 5T DETOX 511-01 Encounter: [id number]  [facility], LEVEL 4  Department of Medical Toxicology  Reason for Admission/Principal Problem: opioid withdrawal  Rounding Provider: Nicole Ridley MD, Oniel Borges MD         Acute drug withdrawal syndrome [facility])  Assessment & Plan  • Uses 10 bags of heroin last used 10:30 AM on 10/1/2023.   • Improving withdrawal symptoms:  Mild nausea  • COWS Score currently < 8  • Butrans patch  • Start microinduction today, likely maintenance tomorrow  • Supportive care and adjunct medications as needed  • Patient is requesting Sublocade and this can be continued once withdrawal has been stabilized    Polysubstance dependence ([address])  Assessment & Plan  · Patient also reports using cocaine and THC  · Encourage cessation  · Consult case management, appreciate recommendations    Heroin use disorder, severe, dependence ([address])  Assessment & Plan  · Patient uses approximately 10 bags of heroin daily IVDA, last use 10:30 AM on 10/1  · See above for management of withdrawal symptoms  · Encourage cessation  · Consult case management, appreciate recommendations  · Interested in [facility] for discharge    Open wounds involving multiple regions of upper and lower extremities with complication  Assessment & Plan  · Has extensive abscesses from IV injection across all extremities with open wounds on bilateral lower extremities which have already been evaluated by general surgery at [facility] signed off on the patient -no need for debridement at this time  · Augmentin for 7 days  · Continue to monitor wounds -- less pain today, no drainage, overall stable in appearance  · Consider I&D for various abscesses if no improvement symptoms or signs of progression    Sleep apnea in adult  Assessment & Plan  · Patient has CPAP at home, suffers from FALLON  · Continue CPAP at bedtime    Tobacco dependency  Assessment & Plan  · Nicotine patch provided  · Encourage cessation          VTE Pharmacologic Prophylaxis:   Pharmacologic: Enoxaparin (Lovenox)  Mechanical VTE Prophylaxis in Place: no    Code Status: Prior      Discussions with Specialists or Other Care Team Provider: no     Education and Discussions with Family / Patient: encourage cessation of recreational substances    Time Spent for Care: 20 minutes. More than 50% of total time spent on counseling and coordination of care as described above. Current Length of Stay: 1 day(s)    Current Patient Status: Inpatient     Certification Statement: The patient will continue to require additional inpatient hospital stay due to opioid withdrawal Discharge Plan: like to be medically stabilized by tomorrow, plan for sublocade injection and outpatient resources      Total time spent today 45 minutes. Greater than 50% of total time was spent with the patient and / or family counseling and / or coordination of care. A description of the counseling / coordination of care: outpatient resources    Subjective:   Lawanda Angel is a 49 yo Male admitted for treatment for opioid withdrawal after abscess treatment. Currently mostly comfortable and feels improved since admission. Of note, he has mild rhinorrhea, myalgias, nausea, sweating and yawning. Lower extremity wounds are currently non-tender and do not bother the patient. Last bowel movement was just prior to admission. Patient is living with his brother. He is resistant to rehab on account of past rehab with unsuccessful outcomes 6 years ago. He is interested in [facility].      Objective:     Clinical Opiate Withdrawal Scale  Pulse: 73  Resting Pulse Rate: Measured After Patient is Sitting or Lying for One Minute: Pulse rate 80 or below  GI Upset: Over Last Half Hour: Mild nausea  Sweating: Over Past Half Hour Not Accounted for by Room Temperature of Patient Activity: Subjective report of chills or flushing  Tremor: Observation of Outstretched Hands: No tremor  Restlessness: Observation During Assessment: Able to sit still  Yawning: Observation During Assessment: No yawning  Pupil Size: Pupils pinned or normal size for room light  Anxiety and Irritability: None  Bone or Joint Aches: If Patient was Having Pain Previously, Only the Additional Component Attributed to Opiate Withdrawal is Scored: Present  Gooseflesh Skin: Skin is smooth  Runny Nose or Tearing: Not Accounted for by Cold Symptoms or Allergies: Nasal stuffiness of unusually moist eyes  Clinical Opiate Withdrawal Scale Total Score: 4    No data recorded      Last 24 Hours Medication List:   Current Facility-Administered Medications   Medication Dose Route Frequency Provider Last Rate   • acetaminophen  650 mg Oral Q6H PRN Alisha Stout PA-C     • amoxicillin-clavulanate  1 tablet Oral Q12H 214 Marianna English MD     • buprenorphine  0.5 mg Sublingual Norville Mcburney, MD     • clonazePAM  2 mg Oral Q6H PRN Alisha Stout PA-C     • cloNIDine  0.1 mg Oral Q6H PRN Alisha Stout PA-C     • enoxaparin  40 mg Subcutaneous Daily Alisha Stout PA-C     • gabapentin  300 mg Oral Q8H PRN Alisha Stout PA-C     • melatonin  6 mg Oral HS PRN Elmira Louise PA-C     • multivitamin-minerals  1 tablet Oral Daily Alisha Stout PA-C     • nicotine  21 mg Transdermal Daily Lance Carbajal MD     • ondansetron  4 mg Intravenous Q6H PRN Alisha Stout PA-C     • transdermal buprenorphine  20 mcg Transdermal Q7 Days Alisha Stout PA-C     • traZODone  50 mg Oral HS PRN Elmira Louise PA-C           Vitals:   Temp (24hrs), Av.5 °F (36.4 °C), Min:97.1 °F (36.2 °C), Max:98.2 °F (36.8 °C)    Temp:  [97.1 °F (36.2 °C)-98.2 °F (36.8 °C)] 97.6 °F (36.4 °C)  HR:  [55-73] 73  Resp:  [16] 16  BP: (110-131)/(69-78) 131/73  SpO2:  [96 %] 96 %  Body mass index is 26.67 kg/m².      Input and Output Summary (last 24 hours):No intake or output data in the 24 hours ending 10/03/23 1217    Physical Exam:   Physical Exam  Constitutional:       Appearance: Normal appearance. HENT:      Head: Normocephalic and atraumatic. Nose: Rhinorrhea present. Mouth/Throat:      Mouth: Mucous membranes are moist.   Eyes:      Conjunctiva/sclera: Conjunctivae normal.      Pupils: Pupils are equal, round, and reactive to light. Cardiovascular:      Rate and Rhythm: Normal rate and regular rhythm. Heart sounds: Normal heart sounds. Pulmonary:      Effort: Pulmonary effort is normal.      Breath sounds: Normal breath sounds. Abdominal:      General: Abdomen is flat. Bowel sounds are normal.   Musculoskeletal:      Cervical back: Normal range of motion. Right lower leg: No edema. Left lower leg: No edema. Skin:     Coloration: Skin is not jaundiced. Findings: Lesion present. Comments: Bilateral LE abscesses with appropriate dressings; chronic leg wounds stable from yesterday -- without drainage   Neurological:      Mental Status: He is alert and oriented to person, place, and time.    Psychiatric:         Mood and Affect: Mood normal.         Behavior: Behavior normal.         Additional Data:     Labs: keep all most recent labs as listed on admission templates   Results from last 7 days   Lab Units 10/03/23  0602 10/02/23  0630 10/01/23  1745   WBC Thousand/uL 5.66 5.73 6.85   HEMOGLOBIN g/dL 14.6 13.4 14.6   HEMATOCRIT % 44.6 42.1 45.2   PLATELETS Thousands/uL 191 189 224   NEUTROS PCT %  --   --  59   LYMPHS PCT %  --   --  30   LYMPHO PCT %  --  36  --    MONOS PCT %  --   --  9   MONO PCT %  --  10  --    EOS PCT %  --  6 2      Results from last 7 days   Lab Units 10/03/23  0602   SODIUM mmol/L 135   POTASSIUM mmol/L 4.1   CHLORIDE mmol/L 105   CO2 mmol/L 24   BUN mg/dL 13   CREATININE mg/dL 0.85   ANION GAP mmol/L 6   CALCIUM mg/dL 8.7   ALBUMIN g/dL 3.0*   TOTAL BILIRUBIN mg/dL 0.34 ALK PHOS U/L 64   ALT U/L 36   AST U/L 36   GLUCOSE RANDOM mg/dL 92      Results from last 7 days   Lab Units 10/01/23  1745   INR  1.05                Results from last 7 days   Lab Units 10/01/23  1745   LACTIC ACID mmol/L 1.3   PROCALCITONIN ng/ml 0.10          * I Have Reviewed All Lab Data Listed Above. * Additional Pertinent Lab Tests Reviewed: [address] [place] Admission Reviewed      Imaging Studies: I have personally reviewed pertinent reports. Recent Cultures (last 7 days):  Results from last 7 days   Lab Units 10/01/23  1750 10/01/23  1745   BLOOD CULTURE  No Growth at 24 hrs. No Growth at 24 hrs. Today, Patient Was Seen By: Kasi Ureña MD    ** Please Note: Dictation voice to text software may have been used in the creation of this document.  **

## 2023-10-03 NOTE — UTILIZATION REVIEW
Initial Clinical Review - patient admitted at the [address] [place] [address] 10/1/23, transferred to the [address] [place] [address] for its Level IV medically managed intensive inpatient detox unit, not available at the [address] [place] [address]. Admission: Date/Time/Statement:   Admission Orders (From admission, onward)     Ordered        10/02/23 1306  Inpatient Admission  Once                      Orders Placed This Encounter   Procedures   • Inpatient Admission     Standing Status:   Standing     Number of Occurrences:   1     Order Specific Question:   Level of Care     Answer:   Med Surg [16]     Order Specific Question:   Estimated length of stay     Answer:   More than 2 Midnights     Order Specific Question:   Certification     Answer:   I certify that inpatient services are medically necessary for this patient for a duration of greater than two midnights. See H&P and MD Progress Notes for additional information about the patient's course of treatment. ED Arrival Information     Patient not seen in ED-transferred from [address] [place] [address]. Initial Presentation: 48 y.o. male with PMH of mood disorder, F[place] and heroin abuse presents as a transfer from the [address] [place] [address] for evaluation and treatment of acute opiate withdrawal. Patient was admitted to the [address] [place] [address] on 10/1/23 for evaluation of open wound of RLE where he typically injects narcotics. Evaluated by Surgery, no need for debridement, Augmentin for 7 days. Today, patient began to develop symptoms of withdrawal. Patient uses approximately 10 bags of heroin daily IVDA, last use 10:30 AM on 10/1. On exam, AOX3, tearful, appears uncomfortable. Bilateral upper and lower extremity wounds diffusely with multiple abscesses both pose and some previously opened with excoriations, no drainage, no erythema the upper extremities. Most prominent wound on lateral aspect of right lower extremity. Mildly tender to palpation.   No crepitus, fluctuance, induration. COWS 2. . Plan: COWS monitoring w/ symptomatic supportive care, Butrans patch, IVF, micro induction of Suboxone when clinically indicated. Augmentin for seven days. Continue CPAP at .        10/3 Medical Toxicology :Currently feels improved since admission. Of note, he has mild rhinorrhea, myalgias, nausea, sweating and yawning. Lower extremity wounds are currently non-tender, with appropriate dressings, without drainage. COWS score currently <8. Start micro induction today, likely maintenance tomorrow. Interested in [place] for discharge.          Admission Vitals   Temperature Pulse Respirations Blood Pressure SpO2   10/02/23 1308 10/02/23 1308 10/02/23 1308 10/02/23 1308 10/02/23 1936   98.2 °F (36.8 °C) 63 16 115/70 96 %      Temp Source Heart Rate Source Patient Position - Orthostatic VS BP Location FiO2 (%)   10/02/23 1308 10/02/23 1308 10/02/23 1308 10/02/23 1308 --   Temporal Monitor Lying Left arm       Pain Score       10/02/23 1308       9          Wt Readings from Last 1 Encounters:   10/02/23 72.7 kg (160 lb 4.4 oz)     Additional Vital Signs:       Date/Time Temp Pulse Resp BP MAP (mmHg) SpO2 O2 Device   10/03/23 0756 97.6 °F (36.4 °C) 73 16 131/73 -- 96 % None (Room air)   10/03/23 0300 97.2 °F (36.2 °C) Abnormal  55 16 122/78 -- 96 % None (Room air)   10/02/23 1936 97.1 °F (36.2 °C) Abnormal  65 16 110/69 82 96 % None (Room air)       Clinical Opioid Withdrawal Scale (COWS):       Row Name 10/03/23 0756 10/03/23 0300 10/02/23 1936 10/02/23 1308    Clinical Opiate Withdrawal Scale   Pulse 73  -KH 55  -NR 65  -CV 63  -WL    Resting Pulse Rate: Measured After Patient is Sitting or Lying for One Minute -- -- -- 0  -WL    GI Upset: Over Last Half Hour -- -- -- 0  -WL    Sweating: Over Past Half Hour Not Accounted for by Room Temperature of Patient Activity -- -- -- 1  -WL    Tremor: Observation of Outstretched Hands -- -- -- 0  -WL    Restlessness: Observation During Assessment -- -- -- 0  -WL    Yawning: Observation During Assessment -- -- -- 0  -WL    Pupil Size -- -- -- 0  -WL    Anxiety and Irritability -- -- -- 0  -WL    Bone or Joint Aches: If Patient was Having Pain Previously, Only the Additional Component Attributed to Opiate Withdrawal is Scored -- -- -- 0  -WL    Gooseflesh Skin -- -- -- 0  -WL    Runny Nose or Tearing: Not Accounted for by Cold Symptoms or Allergies -- -- -- 1  -WL    Clinical Opiate Withdrawal Scale Total Score -- -- -- 2  -WL            Pertinent Labs/Diagnostic Test Results:       10/1 CT RLE: 1. Subcutaneous edema in the posteromedial aspect of right proximal lower leg with a small superficial fluid collection/abscess measuring 1.1 x 0.8 x 0.6 cm. 2. Skin ulcer in the lateral aspect of proximal right lower leg with mild subcutaneous edema. No localized fluid collection.     10/1 EKG:    Normal sinus rhythm  Normal ECG  When compared with ECG of 23-SEP-2019 06:16,  No significant change was found          Results from last 7 days   Lab Units 10/03/23  0602 10/02/23  0630 10/01/23  1745   WBC Thousand/uL 5.66 5.73 6.85   HEMOGLOBIN g/dL 14.6 13.4 14.6   HEMATOCRIT % 44.6 42.1 45.2   PLATELETS Thousands/uL 191 189 224   NEUTROS ABS Thousands/µL  --   --  4.07         Results from last 7 days   Lab Units 10/03/23  0602 10/02/23  0630 10/01/23  1745   SODIUM mmol/L 135 135 139   POTASSIUM mmol/L 4.1 4.3 3.9   CHLORIDE mmol/L 105 103 104   CO2 mmol/L 24 28 31   ANION GAP mmol/L 6 4 4   BUN mg/dL 13 14 16   CREATININE mg/dL 0.85 0.87 0.93   EGFR ml/min/1.73sq m 100 99 94   CALCIUM mg/dL 8.7 8.3* 9.0   MAGNESIUM mg/dL 2.1  --   --      Results from last 7 days   Lab Units 10/03/23  0602 10/02/23  0630 10/01/23  1745   AST U/L 36 34 39   ALT U/L 36 39 48   ALK PHOS U/L 64 64 75   TOTAL PROTEIN g/dL 7.5 7.9 9.2*   ALBUMIN g/dL 3.0* 3.0* 3.5   TOTAL BILIRUBIN mg/dL 0.34 0.38 0.41         Results from last 7 days   Lab Units 10/03/23  0602 10/02/23  0630 10/01/23  9022   GLUCOSE RANDOM mg/dL 92 99 80                 Results from last 7 days   Lab Units 10/01/23  1745   PROTIME seconds 13.8   INR  1.05   PTT seconds 28         Results from last 7 days   Lab Units 10/01/23  1745   PROCALCITONIN ng/ml 0.10     Results from last 7 days   Lab Units 10/01/23  1745   LACTIC ACID mmol/L 1.3         Results from last 7 days   Lab Units 10/01/23  1750 10/01/23  1745   BLOOD CULTURE  No Growth at 24 hrs. No Growth at 24 hrs. Past Medical History:   Diagnosis Date   • Addiction to drug [facility])    • Anxiety    • Depression    • Drug abuse ([facility])    • Hepatitis C virus    • Psychiatric illness    • Sleep apnea      Present on Admission:  • Acute drug withdrawal syndrome ([facility])  • Heroin use disorder, severe, dependence ([address] [place] [address])      Admitting Diagnosis: Open leg wound, right, subsequent encounter [S81.801D]  Heroin withdrawal ([facility]) [F11.93]  Age/Sex: 48 y.o. male       Admission Orders:  Telemetry, continuous pulse oximetry, SCD, CPAP at ,           Scheduled Medications:    amoxicillin-clavulanate, 1 tablet, Oral, Q12H RADHA  enoxaparin, 40 mg, Subcutaneous, Daily  multivitamin-minerals, 1 tablet, Oral, Daily  nicotine, 21 mg, Transdermal, Daily  transdermal buprenorphine, 20 mcg, Transdermal, Q7 Days    buprenorphine (SUBUTEX) 0.5 mg SL tablet (partial) 0.5 mg  Dose: 0.5 mg  Freq: Every 2 hours Route: SL  Start: 10/03/23 1215 End: 10/03/23 2014      Continuous IV Infusions: None. PRN Meds:    acetaminophen, 650 mg, Oral, Q6H PRN  clonazePAM, 2 mg, Oral, Q6H PRN  cloNIDine, 0.1 mg, Oral, Q6H PRN  gabapentin, 300 mg, Oral, Q8H PRN x 1 dose 10/3  melatonin, 6 mg, Oral, HS PRN  ondansetron, 4 mg, Intravenous, Q6H PRN x 1 dose 10/3   traZODone, 50 mg, Oral, HS PRN              IP CONSULT TO CASE MANAGEMENT    Network Utilization Review Department  ATTENTION: Please call with any questions or concerns to [phone] and carefully listen to the prompts so that you are directed to the right person.  All voicemails are confidential.  Linette Saavedra all requests for admission clinical reviews, approved or denied determinations and any other requests to dedicated fax number below belonging to the campus where the patient is receiving treatment.  List of dedicated fax numbers for the Facilities:  [place] DENIALS (Administrative/Medical Necessity) [phone] 2303 [address] [facility] (Maternity/NICU/Pediatrics) [phone]   [address] [place] [address] Drive [phone]   [facility] 1000 [facility] [phone] 1505 [address] [place] [address] [address] [place] [address] [phone]   [address] [phone]

## 2023-10-03 NOTE — ASSESSMENT & PLAN NOTE
• Uses 10 bags of heroin last used 10:30 AM on 10/1/2023. • Current withdrawal symptoms: None  • COWS Score currently < 8  • Butrans patch  • After micro induction, patient appeared to go into precipitated withdrawal after third dose which was managed on Precedex then weaned off.   Patient has now been started on maintenance Suboxone 8 mg twice daily  • Supportive care and adjunct medications as needed  • Patient is requesting Sublocade and this can be continued once withdrawal has been stabilized

## 2023-10-03 NOTE — UTILIZATION REVIEW
NOTIFICATION OF ADMISSION DISCHARGE   This is a Notification of Discharge from [address] [facility]. Please be advised that this patient has been discharge from our facility. Below you will find the admission and discharge date and time including the patient’s disposition. UTILIZATION REVIEW CONTACT:  Pb Avery  Utilization   Network Utilization Review Department  Phone: [phone] carefully listen to the prompts. All voicemails are confidential.  Email: [email or web address]     ADMISSION INFORMATION  PRESENTATION DATE: 10/1/2023  5:05 PM  OBERVATION ADMISSION DATE:   INPATIENT ADMISSION DATE: 10/1/23  7:11 PM   DISCHARGE DATE: 10/2/2023 12:44 PM   DISPOSITION:PRA - Acute Care    IMPORTANT INFORMATION:  Send all requests for admission clinical reviews, approved or denied determinations and any other requests to dedicated fax number below belonging to the campus where the patient is receiving treatment.  List of dedicated fax numbers:  [place] DENIALS (Administrative/Medical Necessity) [phone] 2303 [facility] (Maternity/NICU/Pediatrics) [phone]   [facility] [phone]   [facility] [phone] 1636 [facility] [phone]   [address] [place] [address] [phone]   [facility] [phone]   [address] [place] [address] [address] [place] [address] [phone]   [address] [place] [address] [phone]   3441 [facility] [phone] 2720 [facility] 3000 32[facility] [phone]

## 2023-10-03 NOTE — UTILIZATION REVIEW
NOTIFICATION OF INPATIENT ADMISSION   AUTHORIZATION REQUEST   SERVICING FACILITY:   [address] [place] [address]  Tax ID: [id number]  NPI: [id number] ATTENDING PROVIDER:  Attending Name and NPI#: Beatriz Marinelli Md [[id number]]  Address: [address] [place] [address]  Phone: [phone]     ADMISSION INFORMATION:  Place of Service: Inpatient [address]  Place of Service Code: 21  Inpatient Admission Date/Time: 10/2/23 12:57 PM  Discharge Date/Time: No discharge date for patient encounter. Admitting Diagnosis Code/Description:  Open leg wound, right, subsequent encounter [S81.801D]  Heroin withdrawal ([address]) [F11.93]     UTILIZATION REVIEW CONTACT:  Williams Hernandez, Utilization   Network Utilization Review Department  Phone: [phone]  Fax [phone]  Email: [email or web address]  Contact for approvals/pending authorizations, clinical reviews, and discharge. PHYSICIAN ADVISORY SERVICES:  Medical Necessity Denial & Pgmk-bo-Oqmg Review  Phone: [phone]  Fax: [phone]  Email: [email or web address]

## 2023-10-03 NOTE — ASSESSMENT & PLAN NOTE
· Has extensive abscesses from IV injection across all extremities with open wounds on bilateral lower extremities which have already been evaluated by general surgery at [facility] - CLOSED signed off on the patient -no need for debridement at this time  · Augmentin for 7 days  · Continue to monitor wounds

## 2023-10-04 PROCEDURE — 99233 SBSQ HOSP IP/OBS HIGH 50: CPT | Performed by: EMERGENCY MEDICINE

## 2023-10-04 RX ORDER — LANOLIN ALCOHOL/MO/W.PET/CERES
6 CREAM (GRAM) TOPICAL ONCE
Status: DISCONTINUED | OUTPATIENT
Start: 2023-10-04 | End: 2023-10-05 | Stop reason: HOSPADM

## 2023-10-04 RX ORDER — BUPRENORPHINE AND NALOXONE 2; .5 MG/1; MG/1
4 FILM, SOLUBLE BUCCAL; SUBLINGUAL ONCE
Status: DISCONTINUED | OUTPATIENT
Start: 2023-10-04 | End: 2023-10-04

## 2023-10-04 RX ORDER — DIAZEPAM 5 MG/ML
10 INJECTION, SOLUTION INTRAMUSCULAR; INTRAVENOUS ONCE
Status: DISCONTINUED | OUTPATIENT
Start: 2023-10-04 | End: 2023-10-05 | Stop reason: HOSPADM

## 2023-10-04 RX ORDER — BUPRENORPHINE AND NALOXONE 2; .5 MG/1; MG/1
4 FILM, SOLUBLE BUCCAL; SUBLINGUAL DAILY
Status: DISCONTINUED | OUTPATIENT
Start: 2023-10-04 | End: 2023-10-04

## 2023-10-04 RX ORDER — DEXMEDETOMIDINE HYDROCHLORIDE 4 UG/ML
INJECTION, SOLUTION INTRAVENOUS
Status: DISPENSED
Start: 2023-10-04 | End: 2023-10-04

## 2023-10-04 RX ADMIN — BUPRENORPHINE AND NALOXONE 8 MG: 8; 2 FILM BUCCAL; SUBLINGUAL at 20:17

## 2023-10-04 RX ADMIN — AMOXICILLIN AND CLAVULANATE POTASSIUM 1 TABLET: 875; 125 TABLET, FILM COATED ORAL at 11:22

## 2023-10-04 RX ADMIN — MULTIPLE VITAMINS W/ MINERALS TAB 1 TABLET: TAB ORAL at 08:28

## 2023-10-04 RX ADMIN — CLONAZEPAM 2 MG: 1 TABLET ORAL at 08:28

## 2023-10-04 RX ADMIN — TRAZODONE HYDROCHLORIDE 50 MG: 50 TABLET ORAL at 21:44

## 2023-10-04 RX ADMIN — BUPRENORPHINE AND NALOXONE 8 MG: 8; 2 FILM BUCCAL; SUBLINGUAL at 08:27

## 2023-10-04 RX ADMIN — MELATONIN TAB 3 MG 6 MG: 3 TAB at 21:44

## 2023-10-04 RX ADMIN — NICOTINE 21 MG: 21 PATCH, EXTENDED RELEASE TRANSDERMAL at 08:27

## 2023-10-04 RX ADMIN — DEXMEDETOMIDINE 1.1 MCG/KG/HR: 100 INJECTION, SOLUTION, CONCENTRATE INTRAVENOUS at 04:17

## 2023-10-04 RX ADMIN — AMOXICILLIN AND CLAVULANATE POTASSIUM 1 TABLET: 875; 125 TABLET, FILM COATED ORAL at 23:39

## 2023-10-04 RX ADMIN — ACETAMINOPHEN 650 MG: 325 TABLET ORAL at 11:39

## 2023-10-04 RX ADMIN — GABAPENTIN 300 MG: 300 CAPSULE ORAL at 11:39

## 2023-10-04 NOTE — CASE MANAGEMENT
Pt scheduled for intake appt with idio on 10/10 at 9 AM with Jackie and Follow up appt on 10/23 at 1:15 PM with Rosemarie Sorenson. CM notified pt of SHARE appts; pt in agreement with d/c plan.

## 2023-10-04 NOTE — DISCHARGE INSTR - OTHER ORDERS
HOW TO GET SUBSTANCE ABUSE HELP:  If you or someone you know has a drug or alcohol problem, there is help:  [address]: [address]: [phone]  An assessment is the first step. In addition to those listed there are other programs available in the area but assessment is best to determine an appropriate level of care. If you DO NOT have Medical Assistance (MA) or [employer], an assessment can be scheduled at one of these providers:  8111 [facility]  315 [facility], [address] [place] [address]  [phone]   [facility]  1700 [facility],2 And 3 S Floors., [facility], [address] [place] [address]  [address] [place]  Caleb. DEL[place] 65 [facility]  1900 [facility]  [address]   Step by [address]  2450 [address] [facility]. [address]  [address] [facility]., [address]  [phone]     If you [address], an assessment can be scheduled at one of these providers:  [place] on Alcohol & Drug Abuse  [facility].[address]  830 [facility]  315 [facility], [address] [place] [address]  150 [facility] D&A Intake Unit  [address] 1113 [facility], 1st Floor, [place] [address]  [phone]  1 [facility], [facility] [address] E [facility]  1700 [facility],2 And 3 S Floors., [facility], [address] [place] [address]  [address] [place]  [place] [address], 65 [facility]  [phone]   NET ([address] [facility] [address])  90 [facility]  1801 [facility], [address]  [address]  [address] [facility] [address] 301 N [facility] [address] PAUL   Step by [address]  [phone]   Treatment Trends - 9135 [facility]., [facility], [address]  2021 [facility]., [facility], 350 [facility]  [phone]     If you [facility], an assessment can be scheduled at one of these providers. Please contact these Providers to determine if they are in your network plan:  [facility] D&A Intake Unit  [address] 1113 [facility], 1st Floor, [place] [address]  [facility]  1700 [facility],2 And 3 S Floors., [facility], 350 [facility]  [phone]   223 [facility]. [place], [address] [facility] [address]  [phone]   NET ([address])  [address] 1801 [facility], [place] [address]  [address]  [address] 301 N [facility] 851 [facility]  2021 [facility]., [facility], [address]     901 [facility] Emergency Shelter  [employer] provides safe housing, nutritionally balanced meals, clothing, access to medical care, and a wide variety of local social service and support organizations for men 25years old and older. How to access our Emergency Shelter:  Come to the Emergency Shelter at [address] [facility], [facility] [place] [address], during regular business hours (8:30 am - 5:00 pm) with a valid ID. During the COVID-19 pandemic our Emergency Shelter is open 24 hours a day. Call us at [phone] ext [phone] at any time and we will give you instructions on how to access our facility and to learn more about the services we provide. We offer our services to all men over 18 regardless of race, color, creed, political affiliation, Yazidism affiliation, or sexual orientation. If you’re under the age of 25, a woman, or have dependent children, we recommend you call 80 from any phone to find shelter and other services. 211 is the [facility] direct phone line for "coordinated care" in the [facility].   They have expertise and access to a wide range of housing options. If you are a man over the age of 25, without dependent children living with you, they will very likely direct you back to us. But if you are in any other demographic or special needs group, they have numerous options available for you. Call 211.

## 2023-10-04 NOTE — PLAN OF CARE
Problem: SUBSTANCE USE/ABUSE  Goal: By discharge, will develop insight into their chemical dependency and sustain motivation to continue in recovery  Description: INTERVENTIONS:  - Attends all daily group sessions and scheduled AA groups  - Actively practices coping skills through participation in the therapeutic community and adherence to program rules  - Reviews and completes assignments from individual treatment plan  - Assist patient development of understanding of their personal cycle of addiction and relapse triggers  Outcome: Progressing  Goal: By discharge, patient will have ongoing treatment plan addressing chemical dependency  Description: INTERVENTIONS:  - Assist patient with resources and/or appointments for ongoing recovery based living  Outcome: Progressing     Problem: SKIN/TISSUE INTEGRITY - ADULT  Goal: Skin Integrity remains intact(Skin Breakdown Prevention)  Description: Assess:  -Inspect skin when repositioning, toileting, and assisting with ADLS  -Assess extremities for adequate circulation and sensation     Bed Management:  -Have minimal linens on bed & keep smooth, unwrinkled  -Change linens as needed when moist or perspiring        Toileting:  -Offer bedside commode      Activity:  -Encourage activity and walks on unit  -Encourage or provide ROM exercises   -Use appropriate equipment to lift or move patient in bed    Skin Care:  -Avoid use of baby powder, tape, friction and shearing, hot water or constrictive clothing  -Do not massage red bony areas      Outcome: Progressing  Goal: Incision(s), wounds(s) or drain site(s) healing without S/S of infection  Description: INTERVENTIONS  - Assess and document dressing, incision, wound bed, drain sites and surrounding tissue  - Provide patient and family education  Outcome: Progressing  Goal: Pressure injury heals and does not worsen  Description: Interventions:  - Implement low air loss mattress or specialty surface (Criteria met)  - Apply silicone foam dressing  - Apply fecal or urinary incontinence containment device   - Utilize friction reducing device or surface for transfers   - Consider nutrition services referral as needed  Outcome: Progressing     Problem: Nutrition/Hydration-ADULT  Goal: Nutrient/Hydration intake appropriate for improving, restoring or maintaining nutritional needs  Description: Monitor and assess patient's nutrition/hydration status for malnutrition. Collaborate with interdisciplinary team and initiate plan and interventions as ordered. Monitor patient's weight and dietary intake as ordered or per policy. Utilize nutrition screening tool and intervene as necessary. Determine patient's food preferences and provide high-protein, high-caloric foods as appropriate.      INTERVENTIONS:  - Monitor oral intake, urinary output, labs, and treatment plans  - Assess nutrition and hydration status and recommend course of action  - Evaluate amount of meals eaten  - Assist patient with eating if necessary   - Allow adequate time for meals  - Recommend/ encourage appropriate diets, oral nutritional supplements, and vitamin/mineral supplements  - Order, calculate, and assess calorie counts as needed  - Recommend, monitor, and adjust tube feedings and TPN/PPN based on assessed needs  - Assess need for intravenous fluids  - Provide specific nutrition/hydration education as appropriate  - Include patient/family/caregiver in decisions related to nutrition  Outcome: Progressing

## 2023-10-04 NOTE — DISCHARGE SUMMARY
[facility]  Discharge- Delene Erp 1970, 48 y.o. male MRN: [id number]  Unit/Bed#: 5T DETOX 908-06 Encounter: [id number]  Primary Care Provider: Joshua Elizabeth MD   Date and time admitted to hospital: 10/2/2023 12:57 PM  [facility] 4  Department of Medical Toxicology  Reason for Admission/Principal Problem: opioid withdrawal, opioid use disorder   Admitting provider: [facility] LENO Bush MD  10/2/2023 12:57 PM     Discharging Physician / Practitioner: Cuate Vazquez PA-C  PCP: Joshua Elizabeth MD  Admission Date:   Admission Orders (From admission, onward)     Ordered        10/02/23 1306  Inpatient Admission  Once                      Discharge Date: 10/05/23    Medical Problems     Resolved Problems  Date Reviewed: 10/3/2023          Resolved    * (Principal) Opioid withdrawal ([address]) 10/5/2023     Resolved by  [facility] LENO Bush          * Opioid withdrawal (HCC)-resolved as of 10/5/2023  Assessment & Plan  • Uses 10 bags of heroin last used 10:30 AM on 10/1/2023.   • Suboxone MAT protocol followed for medical management of opioid withdrawal   • During Micro-induction, patient appeared to go into precipitated withdrawal after receiving 1.5 mg Subutex and 6 mg Suboxone-  managed on Precedex infusion for approximately 14 hrs overnight 10/3-10/4  • Weaned off precedex infusion yesterday morning- has remained stable    • Patient has now been started on maintenance Suboxone 8 mg twice daily  • Currently tolerating maintenance suboxone - interested in sublocade injection prior to discharge   • Acute withdrawal resolved     Opioid use disorder, severe, dependence ([address])  Assessment & Plan  · Patient uses approximately 10 bags of heroin daily IVDA, last use 10:30 AM on 10/1  · See above for management of withdrawal symptoms  · Encourage cessation  · Tolerating maintenance suboxone  · Interested in [employer] prior discharge  · Consult case management, appreciate recommendations- will f/u with outpatient MAT at [facility]     Open wounds involving multiple regions of upper and lower extremities with complication  Assessment & Plan  · Has extensive abscesses from IV injection across all extremities with open wounds on bilateral lower extremities   · CT right lower extremity 10/1/2023: "Subcutaneous edema in the posteromedial aspect of right proximal lower leg with a small superficial fluid collection/abscess measuring 1.1 x 0.8 x 0.6 cm. Skin ulcer in the lateral aspect of proximal right lower leg with mild subcutaneous edema. No localized fluid collection."  · evaluated by general surgery at [facility]- surgery signed off on the patient- no need for debridement at this time   · Continue Augmentin for 7 days total   · Continue wound care   · Routine outpatient monitoring- recommend PCP f/u     Hepatitis C antibody positive in blood  Assessment & Plan  · Acute hepatitis panel 10/3/2023: reactive hep C ab  · In setting of IVDU  · Hep C genotype and quant RNA pending  · Recommend outpatient f/u GI  · Encourage cessation of IVDU    Polysubstance dependence ([address])  Assessment & Plan  · Patient also reports using cocaine and THC  · Encourage cessation  · Consult case management, appreciate recommendations    Tobacco dependency  Assessment & Plan  · Nicotine patch provided  · Encourage cessation    Sleep apnea in adult  Assessment & Plan  · Patient has CPAP at home, suffers from FALLON  · Continue CPAP at bedtime  · Recommend outpatient f/u sleep medicine       Consultations During Hospital Stay:  · Case management     Procedures Performed:   · None     Significant Findings / Test Results:   · Acute hepatitis panel - Hep C Ab reactive  · Rapid HIV negative    Incidental Findings:   · None     Test Results Pending at Discharge (will require follow up):    · Hepatitis C genotype and RNA quant PCR     Outpatient Tests / Follow Up Requested:  · Recommend f/u with PCP within 1-2 weeks of discharge  · Follow up with [facility] program for ongoing MAT/multidisciplinary care  · GI f/u     Complications: precipitated withdrawal     Reason for Admission: opioid withdrawal, opioid use disorder    Hospital Course:     Kiko Pérez is a 48 y.o. male patient OUD, IVDU, polysubstance use, FALLON on CPAP who originally presented to the hospital on 10/2/2023 due to opioid withdrawal.  Patient presented 10/2 as a transfer on  from the [facility] M/S floor after he was cleared by General Surgery for open RLE wounds and started on PO Augmentin; he subsequently requested detoxification from opioids/assistance with opioid withdrawal mgmt and agreed to initiation of Suboxone. Pt was subsequently admitted to the [address] [place] [address] Detox Unit for medically assisted opioid withdrawal and Suboxone microinduction. On admission, he was placed on Butrans patch 20 mcg to slowly introduce Suboxone and prevent precipitated withdrawal.  On 10/3/23, patient underwent microinduction with Subutex 0.5 mg Q3H x3 doses followed by Suboxone 2 mg Q2H x3 doses. After his second 2-mg dose of Suboxone, pt began to display increasing agitation that was not responsive to supportive care measures and was started on Precedex infusion and 1:1 observation for safety d/t impulsivity and increased fall risk with chemical sedation. He was then started on maintenance dose Suboxone 8 mg BID that evening, which he tolerated without worsening of w/d sx. He was able to be weaned off Precedex 10/4 AM. His Butrans patch was later removed that afternoon. Tolerated maintenance Suboxone 8 mg BID. Patient expressed interest in transitioning to Sublocade SQ injections during this admission, and he received his first dose of Sublocade 300 mg SQ on 10/5/2023 prior to discharge. Pt tested positive for Hepatitis C in the setting of IVDU with rapid HIV testing negative.  Also provided with referral for GI follow acute hep C status. Case management was consulted for assistance with aftercare resources, and patient will be discharged home with OP f/u through the [facility] program.     Please see above list of diagnoses and related plan for additional information. Condition at Discharge: good     Discharge Day Visit / Exam:     Subjective:  Patient seen and examined bedside this morning. Reports that he is feeling good today and tolerating maintenance suboxone. Currently denies headaches, lightheadedness/dizziness, coughing/sneezing/congestion/rhinorrhea, chest pain, SOB/dyspnea, abdominal pain, N/V/C/D. Requests Sublocade injection prior to discharge. Will follow with outpatient resources. Vitals: Blood Pressure: 114/80 (10/05/23 0900)  Pulse: 80 (10/05/23 0900)  Temperature: (!) 97.4 °F (36.3 °C) (10/05/23 0900)  Temp Source: Temporal (10/05/23 0900)  Respirations: 18 (10/05/23 0900)  Height: 5' 5" (165.1 cm) (10/02/23 1308)  Weight - Scale: 72.7 kg (160 lb 4.4 oz) (10/02/23 1308)  SpO2: 98 % (10/05/23 0900)  Exam:   Physical Exam  Vitals and nursing note reviewed. Constitutional:       General: He is not in acute distress. Appearance: Normal appearance. He is well-developed and normal weight. He is not ill-appearing or diaphoretic. HENT:      Head: Normocephalic and atraumatic. Eyes:      Conjunctiva/sclera: Conjunctivae normal.   Cardiovascular:      Rate and Rhythm: Normal rate and regular rhythm. Pulses: Normal pulses. Heart sounds: Normal heart sounds. No murmur heard. No friction rub. No gallop. Pulmonary:      Effort: Pulmonary effort is normal. No respiratory distress. Breath sounds: Normal breath sounds. No wheezing, rhonchi or rales. Abdominal:      General: Abdomen is flat. Bowel sounds are normal. There is no distension. Palpations: Abdomen is soft. Tenderness: There is no abdominal tenderness. There is no guarding. Musculoskeletal:         General: No swelling.  Normal range of motion. Cervical back: Normal range of motion. Right lower leg: No edema. Left lower leg: No edema. Skin:     General: Skin is warm and dry. Findings: Erythema and wound present. Comments: No piloerection    Neurological:      General: No focal deficit present. Mental Status: He is alert and oriented to person, place, and time. Mental status is at baseline. Motor: No tremor. Psychiatric:         Attention and Perception: Attention normal.         Mood and Affect: Mood normal.         Speech: Speech normal.         Behavior: Behavior is cooperative. Discussion with Family: I personally did not discuss this case with the patient's family. I reviewed the discharge plan with the patient and answered all questions to the best of my ability. Discharge instructions/Information to patient and family:   See after visit summary for information provided to patient and family. Provisions for Follow-Up Care:  See after visit summary for information related to follow-up care and any pertinent home health orders. Disposition:     Home    For Discharges to [facility] SNF:   · Not Applicable to this Patient - Not Applicable to this Patient    Planned Readmission: N/A     Discharge Statement:  I spent 36 minutes discharging the patient. This time was spent on the day of discharge. I had direct contact with the patient on the day of discharge. Greater than 50% of the total time was spent examining patient, answering all patient questions, arranging and discussing plan of care with patient as well as directly providing post-discharge instructions. Additional time then spent on discharge activities. Discharge Medications:  See after visit summary for reconciled discharge medications provided to patient and family.       ** Please Note: This note has been constructed using a voice recognition system **

## 2023-10-04 NOTE — PROGRESS NOTES
PROGRESS NOTE  DEPARTMENT OF MEDICAL TOXICOLOGY  LEVEL 4 MEDICAL DETOX UNIT  Hank Winston 48 y.o. male MRN: [id number]  Unit/Bed#: 5T DETOX 511-01 Encounter: [id number]      Reason for Admission/Principal Problem: opioid withdrawal  Rounding Provider: Caitlin Jaquez MD  Attending Provider: Nova Ybarra MD   10/2/2023 12:57 PM           * Acute drug withdrawal syndrome [facility])  Assessment & Plan  • Uses 10 bags of heroin last used 10:30 AM on 10/1/2023. • Current withdrawal symptoms: None  • COWS Score currently < 8  • Butrans patch  • After micro induction, patient appeared to go into precipitated withdrawal after third dose which was managed on Precedex then weaned off.   Patient has now been started on maintenance Suboxone 8 mg twice daily  • Supportive care and adjunct medications as needed  • Patient is requesting Sublocade and this can be continued once withdrawal has been stabilized    Polysubstance dependence [facility])  Assessment & Plan  · Patient also reports using cocaine and THC  · Encourage cessation  · Consult case management, appreciate recommendations    Heroin use disorder, severe, dependence ([address])  Assessment & Plan  · Patient uses approximately 10 bags of heroin daily IVDA, last use 10:30 AM on 10/1  · See above for management of withdrawal symptoms  · Encourage cessation  · Consult case management, appreciate recommendations  · Interested in [facility] for discharge    Open wounds involving multiple regions of upper and lower extremities with complication  Assessment & Plan  · Has extensive abscesses from IV injection across all extremities with open wounds on bilateral lower extremities which have already been evaluated by general surgery at [facility] signed off on the patient -no need for debridement at this time  · Augmentin for 7 days  · Continue to monitor wounds    Sleep apnea in adult  Assessment & Plan  · Patient has CPAP at home, suffers from FALLON  · Continue CPAP at bedtime    Tobacco dependency  Assessment & Plan  · Nicotine patch provided  · Encourage cessation            VTE Pharmacologic Prophylaxis:   Pharmacologic: Enoxaparin (Lovenox)  Mechanical VTE Prophylaxis in Place: no    Code Status: Prior    Patient Centered Rounds: I have performed bedside rounds with nursing staff today. Discussions with Specialists or Other Care Team Provider: no     Education and Discussions with Family / Patient: no    Time Spent for Care: 20 minutes. More than 50% of total time spent on counseling and coordination of care as described above. Current Length of Stay: 2 day(s)    Current Patient Status: Inpatient     Certification Statement: The patient will continue to require additional inpatient hospital stay due to opioid withdrawal Discharge Plan: Outpatient resources, anticipate discharge tomorrow      Subjective: On my evaluation this morning, the patient has no complaints and states that he feels much improved after he was weaned from Precedex due to possible precipitated withdrawal after third micro induction dose of Suboxone. Now on maintenance Suboxone without issue. Good appetite. Patient noted to be getting ready to eat breakfast during my evaluation. Denies chest pain, shortness of breath, nausea, vomiting, diarrhea, palpitations, abdominal pain, urinary symptoms, changes in stool, worsening leg pain or pain around abscesses on all extremities.     Objective:     Clinical Opiate Withdrawal Scale  Pulse: 62  Resting Pulse Rate: Measured After Patient is Sitting or Lying for One Minute: Pulse rate 80 or below  GI Upset: Over Last Half Hour: Nausea or loose stool  Sweating: Over Past Half Hour Not Accounted for by Room Temperature of Patient Activity: Flushed or observable moistness on face  Tremor: Observation of Outstretched Hands: Tremor can be felt, but not observed  Restlessness: Observation During Assessment: Unable to sit still for more than a few seconds  Yawning: Observation During Assessment: Yawning three or more times during assessment  Pupil Size: Pupils moderately dilated  Anxiety and Irritability: Patient so irritable or anxious that participation in the assessment is difficult  Bone or Joint Aches: If Patient was Having Pain Previously, Only the Additional Component Attributed to Opiate Withdrawal is Scored: Patient is rubbing joints or muschles and is unable to sit still because of discomfort  Gooseflesh Skin: Prominent piloerection  Runny Nose or Tearing: Not Accounted for by Cold Symptoms or Allergies: Nasal stuffiness of unusually moist eyes  Clinical Opiate Withdrawal Scale Total Score: 28    No data recorded      Last 24 Hours Medication List:   Current Facility-Administered Medications   Medication Dose Route Frequency Provider Last Rate   • acetaminophen  650 mg Oral Q6H PRN Tish Chanel PA-C     • amoxicillin-clavulanate  1 tablet Oral Q12H 214 Marianna English MD     • Buprenorphine ER  300 mg Subcutaneous Once Blanco Daniel PA-C     • buprenorphine-naloxone  8 mg Sublingual BID Elmira Tao PA-C     • clonazePAM  2 mg Oral Q6H PRN Tish Chanel PA-C     • cloNIDine  0.1 mg Oral Q6H PRN Tish Chanel PA-C     • dexmedeTOMIDine HCl in sodium chloride 0.9%          • diazepam  10 mg Intravenous Once Blanco Daniel PA-C     • enoxaparin  40 mg Subcutaneous Daily Tish Chanel PA-C     • gabapentin  300 mg Oral Q8H PRN Tish Chanel PA-C     • melatonin  6 mg Oral HS PRN Elmira Tao PA-C     • melatonin  6 mg Oral Once Blanco Daniel PA-C     • multivitamin-minerals  1 tablet Oral Daily Marietta Schulz     • nicotine  21 mg Transdermal Daily Beatriz Marinelli MD     • ondansetron  4 mg Intravenous Q6H PRN Tish Chanel PA-C     • transdermal buprenorphine  20 mcg Transdermal Q7 Days Tish Chanel PA-C     • traZODone  50 mg Oral HS PRN Elmira Tao LENO           Vitals:   Temp (24hrs), Av.7 °F (36.5 °C), Min:96.6 °F (35.9 °C), Max:98.6 °F (37 °C)    Temp:  [96.6 °F (35.9 °C)-98.6 °F (37 °C)] 97.6 °F (36.4 °C)  HR:  [50-86] 62  Resp:  [16-18] 18  BP: ()/(62-96) 99/62  SpO2:  [96 %-99 %] 96 %  Body mass index is 26.67 kg/m². Input and Output Summary (last 24 hours): Intake/Output Summary (Last 24 hours) at 10/4/2023 1447  Last data filed at 10/4/2023 0710  Gross per 24 hour   Intake 221.15 ml   Output --   Net 221.15 ml       Physical Exam:   Physical Exam    Additional Data:     Labs:   Results from last 7 days   Lab Units 10/03/23  0602 10/02/23  0630 10/01/23  1745   WBC Thousand/uL 5.66 5.73 6.85   HEMOGLOBIN g/dL 14.6 13.4 14.6   HEMATOCRIT % 44.6 42.1 45.2   PLATELETS Thousands/uL 191 189 224   NEUTROS PCT %  --   --  59   LYMPHS PCT %  --   --  30   LYMPHO PCT %  --  36  --    MONOS PCT %  --   --  9   MONO PCT %  --  10  --    EOS PCT %  --  6 2      Results from last 7 days   Lab Units 10/03/23  0602   SODIUM mmol/L 135   POTASSIUM mmol/L 4.1   CHLORIDE mmol/L 105   CO2 mmol/L 24   BUN mg/dL 13   CREATININE mg/dL 0.85   ANION GAP mmol/L 6   CALCIUM mg/dL 8.7   ALBUMIN g/dL 3.0*   TOTAL BILIRUBIN mg/dL 0.34   ALK PHOS U/L 64   ALT U/L 36   AST U/L 36   GLUCOSE RANDOM mg/dL 92      Results from last 7 days   Lab Units 10/01/23  1745   INR  1.05                Results from last 7 days   Lab Units 10/01/23  1745   LACTIC ACID mmol/L 1.3   PROCALCITONIN ng/ml 0.10          * I Have Reviewed All Lab Data Listed Above. * Additional Pertinent Lab Tests Reviewed: [address] [place] [address] Admission Reviewed      Imaging Studies: I have personally reviewed pertinent reports. Recent Cultures (last 7 days):  Results from last 7 days   Lab Units 10/01/23  1750 10/01/23  1745   BLOOD CULTURE  No Growth at 48 hrs. No Growth at 48 hrs.          Today, Patient Was Seen By: Kendall Sloan MD    ** Please Note: Dictation voice to text software may have been used in the creation of this document.  **

## 2023-10-04 NOTE — PLAN OF CARE
Problem: SKIN/TISSUE INTEGRITY - ADULT  Goal: Skin Integrity remains intact(Skin Breakdown Prevention)  Description: Assess:  -Assess extremities for adequate circulation and sensation     Bed Management:  -Have minimal linens on bed & keep smooth, unwrinkled  -Change linens as needed when moist or perspiring  Toileting:  -Offer bedside commode      Activity:    Skin Care:  -Avoid use of baby powder, tape, friction and shearing, hot water or constrictive clothing  Outcome: Progressing  Goal: Incision(s), wounds(s) or drain site(s) healing without S/S of infection  Description: INTERVENTIONS  - Assess and document dressing, incision, wound bed, drain sites and surrounding tissue  - Provide patient and family education    Outcome: Progressing  Goal: Pressure injury heals and does not worsen  Description: Interventions:  - Implement low air loss mattress or specialty surface (Criteria met)  - Apply silicone foam dressing  - Consider nutrition services referral as needed  Outcome: Progressing     Problem: SUBSTANCE USE/ABUSE  Goal: By discharge, will develop insight into their chemical dependency and sustain motivation to continue in recovery  Description: INTERVENTIONS:  - Attends all daily group sessions and scheduled AA groups  - Actively practices coping skills through participation in the therapeutic community and adherence to program rules  - Reviews and completes assignments from individual treatment plan  - Assist patient development of understanding of their personal cycle of addiction and relapse triggers  Outcome: Progressing  Goal: By discharge, patient will have ongoing treatment plan addressing chemical dependency  Description: INTERVENTIONS:  - Assist patient with resources and/or appointments for ongoing recovery based living  Outcome: Progressing

## 2023-10-05 VITALS
SYSTOLIC BLOOD PRESSURE: 114 MMHG | TEMPERATURE: 97.4 F | RESPIRATION RATE: 18 BRPM | OXYGEN SATURATION: 98 % | HEIGHT: 65 IN | HEART RATE: 80 BPM | BODY MASS INDEX: 26.7 KG/M2 | WEIGHT: 160.27 LBS | DIASTOLIC BLOOD PRESSURE: 80 MMHG

## 2023-10-05 PROBLEM — F11.93 OPIOID WITHDRAWAL (HCC): Status: ACTIVE | Noted: 2019-09-22

## 2023-10-05 PROBLEM — R76.8 HEPATITIS C ANTIBODY POSITIVE IN BLOOD: Status: ACTIVE | Noted: 2023-10-05

## 2023-10-05 PROBLEM — F11.93 OPIOID WITHDRAWAL (HCC): Status: RESOLVED | Noted: 2019-09-22 | Resolved: 2023-10-05

## 2023-10-05 PROCEDURE — 87521 HEPATITIS C PROBE&RVRS TRNSC: CPT

## 2023-10-05 PROCEDURE — 87902 NFCT AGT GNTYP ALYS HEP C: CPT

## 2023-10-05 PROCEDURE — 99239 HOSP IP/OBS DSCHRG MGMT >30: CPT | Performed by: PHYSICIAN ASSISTANT

## 2023-10-05 PROCEDURE — 87522 HEPATITIS C REVRS TRNSCRPJ: CPT

## 2023-10-05 RX ORDER — AMOXICILLIN AND CLAVULANATE POTASSIUM 875; 125 MG/1; MG/1
1 TABLET, FILM COATED ORAL EVERY 12 HOURS SCHEDULED
Qty: 9 TABLET | Refills: 0 | Status: SHIPPED | OUTPATIENT
Start: 2023-10-05 | End: 2023-10-10

## 2023-10-05 RX ADMIN — AMOXICILLIN AND CLAVULANATE POTASSIUM 1 TABLET: 875; 125 TABLET, FILM COATED ORAL at 11:15

## 2023-10-05 RX ADMIN — MULTIPLE VITAMINS W/ MINERALS TAB 1 TABLET: TAB ORAL at 09:05

## 2023-10-05 RX ADMIN — CLONAZEPAM 2 MG: 1 TABLET ORAL at 03:47

## 2023-10-05 RX ADMIN — BUPRENORPHINE 300 MG: 300 SOLUTION SUBCUTANEOUS at 10:05

## 2023-10-05 RX ADMIN — ACETAMINOPHEN 650 MG: 325 TABLET ORAL at 09:13

## 2023-10-05 RX ADMIN — NICOTINE 21 MG: 21 PATCH, EXTENDED RELEASE TRANSDERMAL at 09:06

## 2023-10-05 RX ADMIN — ENOXAPARIN SODIUM 40 MG: 40 INJECTION SUBCUTANEOUS at 09:05

## 2023-10-05 NOTE — PLAN OF CARE
Problem: SUBSTANCE USE/ABUSE  Goal: By discharge, will develop insight into their chemical dependency and sustain motivation to continue in recovery  Description: INTERVENTIONS:  - Attends all daily group sessions and scheduled AA groups  - Actively practices coping skills through participation in the therapeutic community and adherence to program rules  - Reviews and completes assignments from individual treatment plan  - Assist patient development of understanding of their personal cycle of addiction and relapse triggers  10/5/2023 0108 by Nahed Moreno RN  Outcome: Progressing  10/5/2023 0103 by Nahed Moreno RN  Outcome: Progressing  Goal: By discharge, patient will have ongoing treatment plan addressing chemical dependency  Description: INTERVENTIONS:  - Assist patient with resources and/or appointments for ongoing recovery based living  10/5/2023 0108 by Nahed Moreno RN  Outcome: Progressing  10/5/2023 0103 by Nahed Moreno RN  Outcome: Progressing

## 2023-10-05 NOTE — ASSESSMENT & PLAN NOTE
· Has extensive abscesses from IV injection across all extremities with open wounds on bilateral lower extremities   · CT right lower extremity 10/1/2023: "Subcutaneous edema in the posteromedial aspect of right proximal lower leg with a small superficial fluid collection/abscess measuring 1.1 x 0.8 x 0.6 cm. Skin ulcer in the lateral aspect of proximal right lower leg with mild subcutaneous edema.  No localized fluid collection."  · evaluated by general surgery at [facility]- surgery signed off on the patient- no need for debridement at this time   · Continue Augmentin for 7 days total   · Continue wound care   · Routine outpatient monitoring- recommend PCP f/u

## 2023-10-05 NOTE — ASSESSMENT & PLAN NOTE
· Patient has CPAP at home, suffers from FALLON  · Continue CPAP at bedtime  · Recommend outpatient f/u sleep medicine

## 2023-10-05 NOTE — CASE MANAGEMENT
CM spoke with pt regarding d/c today; reviewed follow up appt with [facility]. Pt in agreement with d/c plans and d/c today. Pt requesting d/c meds to be filled at [address]. Pt reports she will call his brother for d/c transport; if unable pt reports able to walk home.

## 2023-10-05 NOTE — DISCHARGE INSTR - AVS FIRST PAGE
Please schedule a follow-up visit with your primary care provider (PCP), Margy Rubi MD, within 1-2 weeks of discharge from the unit. Any ongoing issues regarding chronic foot/ankle pain and applying for SSI/disability should be addressed first through them. PCP Contact information for Margy Rubi MD  Address for the Office:  [facility] [address]    Phone number: [phone]    Also follow-up with the [address] [place] [address] program office for continued buprenorphine/Sublocade treatment for opioid use disorder with scheduled appointments as written below under "What's next."

## 2023-10-05 NOTE — ASSESSMENT & PLAN NOTE
· Acute hepatitis panel 10/3/2023: reactive hep C ab  · In setting of IVDU  · Hep C genotype and quant RNA pending  · Recommend outpatient f/u GI  · Encourage cessation of IVDU

## 2023-10-05 NOTE — ASSESSMENT & PLAN NOTE
· Patient uses approximately 10 bags of heroin daily IVDA, last use 10:30 AM on 10/1  · See above for management of withdrawal symptoms  · Encourage cessation  · Tolerating maintenance suboxone  · Interested in [facility] prior discharge  · Consult case management, appreciate recommendations- will f/u with outpatient MAT at [facility]

## 2023-10-05 NOTE — ASSESSMENT & PLAN NOTE
• Uses 10 bags of heroin last used 10:30 AM on 10/1/2023.   • Suboxone MAT protocol followed for medical management of opioid withdrawal   • During Micro-induction, patient appeared to go into precipitated withdrawal after receiving 1.5 mg Subutex and 6 mg Suboxone-  managed on Precedex infusion for approximately 14 hrs overnight 10/3-10/4  • Weaned off precedex infusion yesterday morning- has remained stable    • Patient has now been started on maintenance Suboxone 8 mg twice daily  • Currently tolerating maintenance suboxone - interested in sublocade injection prior to discharge   • Acute withdrawal resolved

## 2023-10-06 LAB
HCV RNA SERPL NAA+PROBE-ACNC: ABNORMAL IU/ML
HCV RNA SERPL NAA+PROBE-ACNC: DETECTED K[IU]/ML

## 2023-10-07 LAB
BACTERIA BLD CULT: NORMAL
BACTERIA BLD CULT: NORMAL
HCV GENTYP SERPL NAA+PROBE: NORMAL
HCV PLEASE NOTE: NORMAL

## 2023-10-10 NOTE — UTILIZATION REVIEW
NOTIFICATION OF ADMISSION DISCHARGE   This is a Notification of Discharge from [facility]. Please be advised that this patient has been discharge from our facility. Below you will find the admission and discharge date and time including the patient’s disposition. UTILIZATION REVIEW CONTACT:  Patricia Peterson  Utilization   Network Utilization Review Department  Phone: [phone] x carefully listen to the prompts. All voicemails are confidential.  Email: [email or web address]     ADMISSION INFORMATION  PRESENTATION DATE: 10/2/2023 12:57 PM  OBERVATION ADMISSION DATE:   INPATIENT ADMISSION DATE: 10/2/23 12:57 PM   DISCHARGE DATE: 10/5/2023 11:52 AM   DISPOSITION:Home/Self Care    Network Utilization Review Department  ATTENTION: Please call with any questions or concerns to [phone] and carefully listen to the prompts so that you are directed to the right person. All voicemails are confidential.   For Discharge needs, contact Care Management DC Support Team at [phone] opt. [phone]  Send all requests for admission clinical reviews, approved or denied determinations and any other requests to dedicated fax number below belonging to the campus where the patient is receiving treatment.  List of dedicated fax numbers for the Facilities:  [place] DENIALS (Administrative/Medical Necessity) [phone]   DISCHARGE SUPPORT TEAM (Network) [phone] [facility] (Maternity/NICU/Pediatrics) [phone]   [address] [facility] [address] [phone] [address] [facility] [phone]   [address] [place] [address]  [address]  [phone]

## 2023-10-11 ENCOUNTER — HOSPITAL ENCOUNTER (EMERGENCY)
Facility: HOSPITAL | Age: 53
Discharge: HOME | End: 2023-10-11
Attending: EMERGENCY MEDICINE | Admitting: EMERGENCY MEDICINE
Payer: COMMERCIAL

## 2023-10-11 VITALS
TEMPERATURE: 99.3 F | BODY MASS INDEX: 25.31 KG/M2 | WEIGHT: 152.12 LBS | RESPIRATION RATE: 18 BRPM | DIASTOLIC BLOOD PRESSURE: 73 MMHG | SYSTOLIC BLOOD PRESSURE: 124 MMHG | HEART RATE: 81 BPM | OXYGEN SATURATION: 99 %

## 2023-10-11 DIAGNOSIS — F11.11 HISTORY OF OPIOID ABUSE (HCC): ICD-10-CM

## 2023-10-11 DIAGNOSIS — R68.89 FLU-LIKE SYMPTOMS: Primary | ICD-10-CM

## 2023-10-11 LAB
ALBUMIN SERPL BCP-MCNC: 3.5 G/DL (ref 3.5–5)
ALP SERPL-CCNC: 58 U/L (ref 34–104)
ALT SERPL W P-5'-P-CCNC: 30 U/L (ref 7–52)
ANION GAP SERPL CALCULATED.3IONS-SCNC: 7 MMOL/L
AST SERPL W P-5'-P-CCNC: 28 U/L (ref 13–39)
BASOPHILS # BLD AUTO: 0.01 THOUSANDS/ÂΜL (ref 0–0.1)
BASOPHILS NFR BLD AUTO: 0 % (ref 0–1)
BILIRUB SERPL-MCNC: 0.53 MG/DL (ref 0.2–1)
BUN SERPL-MCNC: 17 MG/DL (ref 5–25)
CALCIUM SERPL-MCNC: 8.5 MG/DL (ref 8.4–10.2)
CHLORIDE SERPL-SCNC: 108 MMOL/L (ref 96–108)
CO2 SERPL-SCNC: 25 MMOL/L (ref 21–32)
CREAT SERPL-MCNC: 0.88 MG/DL (ref 0.6–1.3)
EOSINOPHIL # BLD AUTO: 0.05 THOUSAND/ÂΜL (ref 0–0.61)
EOSINOPHIL NFR BLD AUTO: 1 % (ref 0–6)
ERYTHROCYTE [DISTWIDTH] IN BLOOD BY AUTOMATED COUNT: 13 % (ref 11.6–15.1)
FLUAV RNA RESP QL NAA+PROBE: NEGATIVE
FLUBV RNA RESP QL NAA+PROBE: NEGATIVE
GFR SERPL CREATININE-BSD FRML MDRD: 99 ML/MIN/1.73SQ M
GLUCOSE SERPL-MCNC: 109 MG/DL (ref 65–140)
HCT VFR BLD AUTO: 46.5 % (ref 36.5–49.3)
HGB BLD-MCNC: 15.5 G/DL (ref 12–17)
IMM GRANULOCYTES # BLD AUTO: 0.05 THOUSAND/UL (ref 0–0.2)
IMM GRANULOCYTES NFR BLD AUTO: 1 % (ref 0–2)
LIPASE SERPL-CCNC: 10 U/L (ref 11–82)
LYMPHOCYTES # BLD AUTO: 0.54 THOUSANDS/ÂΜL (ref 0.6–4.47)
LYMPHOCYTES NFR BLD AUTO: 6 % (ref 14–44)
MCH RBC QN AUTO: 32.2 PG (ref 26.8–34.3)
MCHC RBC AUTO-ENTMCNC: 33.3 G/DL (ref 31.4–37.4)
MCV RBC AUTO: 97 FL (ref 82–98)
MONOCYTES # BLD AUTO: 0.37 THOUSAND/ÂΜL (ref 0.17–1.22)
MONOCYTES NFR BLD AUTO: 4 % (ref 4–12)
NEUTROPHILS # BLD AUTO: 8.3 THOUSANDS/ÂΜL (ref 1.85–7.62)
NEUTS SEG NFR BLD AUTO: 88 % (ref 43–75)
NRBC BLD AUTO-RTO: 0 /100 WBCS
PLATELET # BLD AUTO: 196 THOUSANDS/UL (ref 149–390)
PMV BLD AUTO: 10.1 FL (ref 8.9–12.7)
POTASSIUM SERPL-SCNC: 3.7 MMOL/L (ref 3.5–5.3)
PROT SERPL-MCNC: 8.1 G/DL (ref 6.4–8.4)
RBC # BLD AUTO: 4.81 MILLION/UL (ref 3.88–5.62)
RSV RNA RESP QL NAA+PROBE: NEGATIVE
SARS-COV-2 RNA RESP QL NAA+PROBE: NEGATIVE
SODIUM SERPL-SCNC: 140 MMOL/L (ref 135–147)
WBC # BLD AUTO: 9.32 THOUSAND/UL (ref 4.31–10.16)

## 2023-10-11 PROCEDURE — 85025 COMPLETE CBC W/AUTO DIFF WBC: CPT

## 2023-10-11 PROCEDURE — 83690 ASSAY OF LIPASE: CPT

## 2023-10-11 PROCEDURE — 36415 COLL VENOUS BLD VENIPUNCTURE: CPT

## 2023-10-11 PROCEDURE — 80053 COMPREHEN METABOLIC PANEL: CPT

## 2023-10-11 PROCEDURE — 96360 HYDRATION IV INFUSION INIT: CPT

## 2023-10-11 PROCEDURE — 0241U HB NFCT DS VIR RESP RNA 4 TRGT: CPT

## 2023-10-11 PROCEDURE — 99284 EMERGENCY DEPT VISIT MOD MDM: CPT | Performed by: EMERGENCY MEDICINE

## 2023-10-11 PROCEDURE — 99283 EMERGENCY DEPT VISIT LOW MDM: CPT

## 2023-10-11 RX ORDER — SENNOSIDES 8.6 MG
650 CAPSULE ORAL EVERY 8 HOURS PRN
Qty: 30 TABLET | Refills: 0 | Status: SHIPPED | OUTPATIENT
Start: 2023-10-11

## 2023-10-11 RX ORDER — ONDANSETRON 4 MG/1
4 TABLET, ORALLY DISINTEGRATING ORAL EVERY 6 HOURS PRN
Qty: 8 TABLET | Refills: 0 | Status: SHIPPED | OUTPATIENT
Start: 2023-10-11

## 2023-10-11 RX ORDER — IBUPROFEN 400 MG/1
400 TABLET ORAL EVERY 6 HOURS PRN
Qty: 20 TABLET | Refills: 0 | Status: SHIPPED | OUTPATIENT
Start: 2023-10-11

## 2023-10-11 RX ORDER — ONDANSETRON 4 MG/1
4 TABLET, ORALLY DISINTEGRATING ORAL ONCE
Status: COMPLETED | OUTPATIENT
Start: 2023-10-11 | End: 2023-10-11

## 2023-10-11 RX ADMIN — SODIUM CHLORIDE 1000 ML: 0.9 INJECTION, SOLUTION INTRAVENOUS at 15:01

## 2023-10-11 RX ADMIN — ONDANSETRON 4 MG: 4 TABLET, ORALLY DISINTEGRATING ORAL at 13:31

## 2023-10-11 NOTE — DISCHARGE INSTRUCTIONS
Follow-up with primary care provider, share program, return to ED for any worsening symptoms as discussed. Stay hydrated.

## 2023-10-11 NOTE — ED PROVIDER NOTES
History  Chief Complaint   Patient presents with    Detox Evaluation     Pt c/o withdraw sypmtoms, nausea and body aches s/p "detox shot" on 85.     68-year-old male with past medical history of substance abuse including opiate abuse, anxiety, depression, hep c presents to emergency department complaining of nausea, vomiting, generalized abdominal pain, chills, rhinorrhea, myalgias, cough that started this morning. He was discharged from detox unit and got Sublocade injection 5 days prior to arrival.  He denies any opioid use since discharge. Denies other drug or alcohol use. He is unsure about sick contacts. No medications taken. Denies fever, chest pain, shortness of breath, hemoptysis, leg swelling, abdominal distention, decreased urination, numbness, weakness, rash, back pain, neck pain, syncope, SI/HI. History provided by:  Patient and medical records  Detox Evaluation  Associated symptoms: abdominal pain, nausea and vomiting    Associated symptoms: no headaches, no palpitations, no seizures, no shortness of breath, no suicidal ideation and no weakness        Prior to Admission Medications   Prescriptions Last Dose Informant Patient Reported? Taking?   amoxicillin-clavulanate (AUGMENTIN) 875-125 mg per tablet   No No   Sig: Take 1 tablet by mouth every 12 (twelve) hours for 9 doses      Facility-Administered Medications: None       Past Medical History:   Diagnosis Date    Addiction to drug ([facility])     Anxiety     Depression     Drug abuse ([facility])     Hepatitis C virus     Psychiatric illness     Sleep apnea        Past Surgical History:   Procedure Laterality Date    CYST REMOVAL      ELBOW SURGERY         History reviewed. No pertinent family history. I have reviewed and agree with the history as documented.     E-Cigarette/Vaping    E-Cigarette Use Never User      E-Cigarette/Vaping Substances    Nicotine No     THC No     CBD No     Flavoring No     Other No     Unknown No      Social History Tobacco Use    Smoking status: Every Day     Packs/day: 1.00     Types: Cigarettes    Smokeless tobacco: Never   Vaping Use    Vaping Use: Never used   Substance Use Topics    Alcohol use: Not Currently    Drug use: Yes     Types: Heroin, Cocaine, Marijuana     Comment: iv heroin and cocaine, smokes k2 also, all daily       Review of Systems   Constitutional:  Positive for chills. Negative for diaphoresis and fever. HENT:  Positive for rhinorrhea. Negative for ear pain, sore throat and trouble swallowing. Eyes:  Negative for pain and visual disturbance. Respiratory:  Positive for cough. Negative for shortness of breath. Cardiovascular:  Negative for chest pain, palpitations and leg swelling. Gastrointestinal:  Positive for abdominal pain, diarrhea, nausea and vomiting. Negative for abdominal distention, blood in stool and constipation. Genitourinary:  Negative for decreased urine volume, dysuria and hematuria. Musculoskeletal:  Positive for myalgias. Negative for back pain, joint swelling and neck stiffness. Skin:  Negative for color change and rash. Neurological:  Negative for dizziness, tremors, seizures, syncope, weakness, numbness and headaches. Psychiatric/Behavioral:  Negative for suicidal ideas. All other systems reviewed and are negative. Physical Exam  Physical Exam  Vitals and nursing note reviewed. Constitutional:       General: He is awake. He is not in acute distress. Appearance: Normal appearance. He is not ill-appearing, toxic-appearing or diaphoretic. HENT:      Head: Normocephalic and atraumatic. Nose: Rhinorrhea present. Mouth/Throat:      Lips: Pink. Mouth: Mucous membranes are moist.   Eyes:      General: Vision grossly intact. No scleral icterus. Pupils: Pupils are equal, round, and reactive to light. Comments: No pinpoint pupils    Cardiovascular:      Rate and Rhythm: Normal rate and regular rhythm.       Heart sounds: Normal heart sounds. Pulmonary:      Effort: Pulmonary effort is normal. No respiratory distress. Breath sounds: Normal breath sounds. No stridor. No wheezing, rhonchi or rales. Abdominal:      General: There is no distension. Palpations: Abdomen is soft. Tenderness: There is no abdominal tenderness. Musculoskeletal:         General: No swelling. Right lower leg: No edema. Left lower leg: No edema. Skin:     General: Skin is warm and dry. Capillary Refill: Capillary refill takes less than 2 seconds. Coloration: Skin is not jaundiced. Findings: No rash. Comments: No piloerection   Neurological:      Mental Status: He is alert and oriented to person, place, and time. Gait: Gait normal.   Psychiatric:         Behavior: Behavior is cooperative.          Vital Signs  ED Triage Vitals [10/11/23 1316]   Temperature Pulse Respirations Blood Pressure SpO2   98.9 °F (37.2 °C) 74 16 141/92 97 %      Temp Source Heart Rate Source Patient Position - Orthostatic VS BP Location FiO2 (%)   Tympanic Monitor Lying Left arm --      Pain Score       --           Vitals:    10/11/23 1316 10/11/23 1328 10/11/23 1536   BP: 141/92 (!) 137/105 124/73   Pulse: 74 78 81   Patient Position - Orthostatic VS: Lying Lying Lying         Visual Acuity      ED Medications  Medications   ondansetron (ZOFRAN-ODT) dispersible tablet 4 mg (4 mg Oral Given 10/11/23 1331)   sodium chloride 0.9 % bolus 1,000 mL (0 mL Intravenous Stopped 10/11/23 1536)       Diagnostic Studies  Results Reviewed       Procedure Component Value Units Date/Time    FLU/RSV/COVID - if FLU/RSV clinically relevant [[id number]]  (Normal) Collected: 10/11/23 1429    Lab Status: Final result Specimen: Nares from Nose Updated: 10/11/23 9059     SARS-CoV-2 Negative     INFLUENZA A PCR Negative     INFLUENZA B PCR Negative     RSV PCR Negative    Narrative:      FOR PEDIATRIC PATIENTS - copy/paste COVID Guidelines URL to browser: [email or web address]    SARS-CoV-2 assay is a Nucleic Acid Amplification assay intended for the  qualitative detection of nucleic acid from SARS-CoV-2 in nasopharyngeal  swabs. Results are for the presumptive identification of SARS-CoV-2 RNA. Positive results are indicative of infection with SARS-CoV-2, the virus  causing COVID-19, but do not rule out bacterial infection or co-infection  with other viruses. Laboratories within the [facility] and its  territories are required to report all positive results to the appropriate  public health authorities. Negative results do not preclude SARS-CoV-2  infection and should not be used as the sole basis for treatment or other  patient management decisions. Negative results must be combined with  clinical observations, patient history, and epidemiological information. This test has not been FDA cleared or approved. This test has been authorized by FDA under an Emergency Use Authorization  (EUA). This test is only authorized for the duration of time the  declaration that circumstances exist justifying the authorization of the  emergency use of an in vitro diagnostic tests for detection of SARS-CoV-2  virus and/or diagnosis of COVID-19 infection under section 564(b)(1) of  the Act, 21 U. S.C. 749YBR-2(D)(5), unless the authorization is terminated  or revoked sooner. The test has been validated but independent review by FDA  and CLIA is pending. Test performed using [employer] GeneXpert: This RT-PCR assay targets N2,  a region unique to SARS-CoV-2. A conserved region in the E-gene was chosen  for pan-Sarbecovirus detection which includes SARS-CoV-2. According to CMS-2020-01-R, this platform meets the definition of high-throughput technology.     CBC and differential [[id number]]  (Abnormal) Collected: 10/11/23 1501    Lab Status: Final result Specimen: Blood from Arm, Left Updated: 10/11/23 1547     WBC 9.32 Thousand/uL      RBC 4.81 Million/uL      Hemoglobin 15.5 g/dL      Hematocrit 46.5 %      MCV 97 fL      MCH 32.2 pg      MCHC 33.3 g/dL      RDW 13.0 %      MPV 10.1 fL      Platelets 828 Thousands/uL      nRBC 0 /100 WBCs      Neutrophils Relative 88 %      Immat GRANS % 1 %      Lymphocytes Relative 6 %      Monocytes Relative 4 %      Eosinophils Relative 1 %      Basophils Relative 0 %      Neutrophils Absolute 8.30 Thousands/µL      Immature Grans Absolute 0.05 Thousand/uL      Lymphocytes Absolute 0.54 Thousands/µL      Monocytes Absolute 0.37 Thousand/µL      Eosinophils Absolute 0.05 Thousand/µL      Basophils Absolute 0.01 Thousands/µL     Comprehensive metabolic panel [[id number]] Collected: 10/11/23 1501    Lab Status: Final result Specimen: Blood from Arm, Left Updated: 10/11/23 1528     Sodium 140 mmol/L      Potassium 3.7 mmol/L      Chloride 108 mmol/L      CO2 25 mmol/L      ANION GAP 7 mmol/L      BUN 17 mg/dL      Creatinine 0.88 mg/dL      Glucose 109 mg/dL      Calcium 8.5 mg/dL      AST 28 U/L      ALT 30 U/L      Alkaline Phosphatase 58 U/L      Total Protein 8.1 g/dL      Albumin 3.5 g/dL      Total Bilirubin 0.53 mg/dL      eGFR 99 ml/min/1.73sq m     Narrative:      [place] guidelines for Chronic Kidney Disease (CKD):     Stage 1 with normal or high GFR (GFR > 90 mL/min/1.73 square meters)    Stage 2 Mild CKD (GFR = 60-89 mL/min/1.73 square meters)    Stage 3A Moderate CKD (GFR = 45-59 mL/min/1.73 square meters)    Stage 3B Moderate CKD (GFR = 30-44 mL/min/1.73 square meters)    Stage 4 Severe CKD (GFR = 15-29 mL/min/1.73 square meters)    Stage 5 End Stage CKD (GFR <15 mL/min/1.73 square meters)  Note: GFR calculation is accurate only with a steady state creatinine    Lipase [[id number]]  (Abnormal) Collected: 10/11/23 1501    Lab Status: Final result Specimen: Blood from Arm, Left Updated: 10/11/23 1528     Lipase 10 u/L                    No orders to display Procedures  Procedures         ED Course  ED Course as of 10/11/23 4291   Wed Oct 11, 2023   3840 Discussed with Detox CHANEL Dashawn Trejo, who recommends supportive care (antiemetic, anxiolytic), states that likelihood of withdrawal while on sublocade is less likely. He also recommends host referral if patient would like rehab.    [phone] Patient reports that he feels much better. There is no longer having chills, nausea, abdominal pain. Abdomen soft, nondistended, no tenderness. Tolerating p.o. water. Patient declined rehab, reports he will continue to follow-up with the share program, keep his appointment. Plan for supportive care. Patient was reexamined at this time and informed of laboratory and/or imaging results and was found to be stable for discharge. Return to emergency department criteria was reviewed with the patient who verbalized understanding and was agreeable to discharge and the treatment plan at this time. Medical Decision Making  Differential diagnosis includes but is not limited to viral syndrome, pancreatitis, withdrawal, cholecystitis, appendicitis. Constellation of symptoms likely representing viral syndrome. Vital signs are stable. He is afebrile. No tachycardia. No evidence of acute intoxication. No acute respiratory distress. Benign, reassuring abdominal exam without peritoneal signs. Will test for COVID-19/RSV/influenza. CBC, CMP grossly normal, no lipase elevation. Improvement of symptoms with supportive care, is tolerating p.o. at this time. Plan for continued supportive care at home. Discussed with detox CHANEL, low likelihood of withdrawal since patient on sublocade, supportive care recommended. Patient declines rehab, agrees to continue follow-up with the share program, will call them. All imaging and/or lab testing discussed with patient, strict return to ED precautions discussed.  Patient recommended to follow up promptly with appropriate outpatient provider. Patient and/or family members verbalizes understanding and agrees with plan. Patient and/or family members were given opportunity to ask questions, all questions were answered at this time. Patient is stable for discharge. Portions of the record may have been created with voice recognition software. Occasional wrong word or "sound a like" substitutions may have occurred due to the inherent limitations of voice recognition software. Read the chart carefully and recognize, using context, where substitutions have occurred. Amount and/or Complexity of Data Reviewed  Labs: ordered. Risk  OTC drugs. Prescription drug management. Disposition  Final diagnoses:   Flu-like symptoms   History of opioid abuse ([address])     Time reflects when diagnosis was documented in both MDM as applicable and the Disposition within this note       Time User Action Codes Description Comment    10/11/2023  4:10 PM Verlinda Sine Add [R68.89] Flu-like symptoms     10/11/2023  4:11 PM Verlinda Sine Add [F11.11] History of opioid abuse [facility])           ED Disposition       ED Disposition   Discharge    Condition   Stable    Date/Time   Wed Oct 11, 2023  4:10 PM    [address] discharge to home/self care.                    Follow-up Information       Follow up With Specialties Details Why Contact Info Additional Information    Chrissy Bowie MD Family Medicine Schedule an appointment as soon as possible for a visit  For follow up regarding your symptoms [address] [facility] Toxicology Medication-Assisted Treatment [address] Toxicology Call  to discuss symptoms and keep your upcoming app[address] [facility] [facility] Toxicology Medication-Assisted [address] [place], [place], [address], [phone] Discharge Medication List as of 10/11/2023  4:12 PM        START taking these medications    Details   acetaminophen (TYLENOL) 650 mg CR tablet Take 1 tablet (650 mg total) by mouth every 8 (eight) hours as needed for mild pain, Starting Wed 10/11/2023, Normal      ibuprofen (MOTRIN) 400 mg tablet Take 1 tablet (400 mg total) by mouth every 6 (six) hours as needed for mild pain, Starting Wed 10/11/2023, Normal      ondansetron (ZOFRAN-ODT) 4 mg disintegrating tablet Take 1 tablet (4 mg total) by mouth every 6 (six) hours as needed for vomiting for up to 8 doses, Starting Wed 10/11/2023, Normal           STOP taking these medications       amoxicillin-clavulanate (AUGMENTIN) 875-125 mg per tablet Comments:   Reason for Stopping:               No discharge procedures on file.     PDMP Review         Value Time User    PDMP Reviewed  Yes 10/5/2023  9:37 AM David Bueno PA-C            ED Provider  Electronically Signed by             Britta Hill PA-C  10/11/23 8761

## 2023-10-20 ENCOUNTER — APPOINTMENT (EMERGENCY)
Dept: RADIOLOGY | Facility: HOSPITAL | Age: 53
End: 2023-10-20
Payer: COMMERCIAL

## 2023-10-20 ENCOUNTER — HOSPITAL ENCOUNTER (EMERGENCY)
Facility: HOSPITAL | Age: 53
Discharge: HOME | End: 2023-10-20
Attending: EMERGENCY MEDICINE
Payer: COMMERCIAL

## 2023-10-20 VITALS
BODY MASS INDEX: 26.96 KG/M2 | HEART RATE: 74 BPM | RESPIRATION RATE: 16 BRPM | DIASTOLIC BLOOD PRESSURE: 84 MMHG | SYSTOLIC BLOOD PRESSURE: 118 MMHG | TEMPERATURE: 98.1 F | OXYGEN SATURATION: 98 % | WEIGHT: 162 LBS

## 2023-10-20 DIAGNOSIS — M25.579 ANKLE PAIN: Primary | ICD-10-CM

## 2023-10-20 DIAGNOSIS — E87.6 HYPOKALEMIA: ICD-10-CM

## 2023-10-20 DIAGNOSIS — T14.8XXA CHRONIC WOUND: ICD-10-CM

## 2023-10-20 DIAGNOSIS — R06.00 DYSPNEA: ICD-10-CM

## 2023-10-20 LAB
ALBUMIN SERPL BCP-MCNC: 3.8 G/DL (ref 3.5–5)
ALP SERPL-CCNC: 47 U/L (ref 34–104)
ALT SERPL W P-5'-P-CCNC: 20 U/L (ref 7–52)
ANION GAP SERPL CALCULATED.3IONS-SCNC: 7 MMOL/L
AST SERPL W P-5'-P-CCNC: 22 U/L (ref 13–39)
ATRIAL RATE: 81 BPM
BASOPHILS # BLD AUTO: 0.02 THOUSANDS/ÂΜL (ref 0–0.1)
BASOPHILS NFR BLD AUTO: 0 % (ref 0–1)
BILIRUB SERPL-MCNC: 1.14 MG/DL (ref 0.2–1)
BUN SERPL-MCNC: 10 MG/DL (ref 5–25)
CALCIUM SERPL-MCNC: 8.7 MG/DL (ref 8.4–10.2)
CHLORIDE SERPL-SCNC: 100 MMOL/L (ref 96–108)
CO2 SERPL-SCNC: 31 MMOL/L (ref 21–32)
CREAT SERPL-MCNC: 0.86 MG/DL (ref 0.6–1.3)
EOSINOPHIL # BLD AUTO: 0.14 THOUSAND/ÂΜL (ref 0–0.61)
EOSINOPHIL NFR BLD AUTO: 2 % (ref 0–6)
ERYTHROCYTE [DISTWIDTH] IN BLOOD BY AUTOMATED COUNT: 13 % (ref 11.6–15.1)
GFR SERPL CREATININE-BSD FRML MDRD: 100 ML/MIN/1.73SQ M
GLUCOSE SERPL-MCNC: 91 MG/DL (ref 65–140)
HCT VFR BLD AUTO: 43.3 % (ref 36.5–49.3)
HGB BLD-MCNC: 14 G/DL (ref 12–17)
IMM GRANULOCYTES # BLD AUTO: 0.01 THOUSAND/UL (ref 0–0.2)
IMM GRANULOCYTES NFR BLD AUTO: 0 % (ref 0–2)
LYMPHOCYTES # BLD AUTO: 2.38 THOUSANDS/ÂΜL (ref 0.6–4.47)
LYMPHOCYTES NFR BLD AUTO: 36 % (ref 14–44)
MCH RBC QN AUTO: 31.3 PG (ref 26.8–34.3)
MCHC RBC AUTO-ENTMCNC: 32.3 G/DL (ref 31.4–37.4)
MCV RBC AUTO: 97 FL (ref 82–98)
MONOCYTES # BLD AUTO: 0.63 THOUSAND/ÂΜL (ref 0.17–1.22)
MONOCYTES NFR BLD AUTO: 9 % (ref 4–12)
NEUTROPHILS # BLD AUTO: 3.53 THOUSANDS/ÂΜL (ref 1.85–7.62)
NEUTS SEG NFR BLD AUTO: 53 % (ref 43–75)
NRBC BLD AUTO-RTO: 0 /100 WBCS
P AXIS: 43 DEGREES
PLATELET # BLD AUTO: 209 THOUSANDS/UL (ref 149–390)
PMV BLD AUTO: 9.6 FL (ref 8.9–12.7)
POTASSIUM SERPL-SCNC: 3.1 MMOL/L (ref 3.5–5.3)
PR INTERVAL: 134 MS
PROT SERPL-MCNC: 8 G/DL (ref 6.4–8.4)
QRS AXIS: -15 DEGREES
QRSD INTERVAL: 86 MS
QT INTERVAL: 400 MS
QTC INTERVAL: 464 MS
RBC # BLD AUTO: 4.47 MILLION/UL (ref 3.88–5.62)
SODIUM SERPL-SCNC: 138 MMOL/L (ref 135–147)
T WAVE AXIS: 52 DEGREES
VENTRICULAR RATE: 81 BPM
WBC # BLD AUTO: 6.71 THOUSAND/UL (ref 4.31–10.16)

## 2023-10-20 PROCEDURE — 36415 COLL VENOUS BLD VENIPUNCTURE: CPT | Performed by: EMERGENCY MEDICINE

## 2023-10-20 PROCEDURE — 96372 THER/PROPH/DIAG INJ SC/IM: CPT

## 2023-10-20 PROCEDURE — 93010 ELECTROCARDIOGRAM REPORT: CPT

## 2023-10-20 PROCEDURE — 80053 COMPREHEN METABOLIC PANEL: CPT | Performed by: EMERGENCY MEDICINE

## 2023-10-20 PROCEDURE — 85025 COMPLETE CBC W/AUTO DIFF WBC: CPT | Performed by: EMERGENCY MEDICINE

## 2023-10-20 PROCEDURE — 99285 EMERGENCY DEPT VISIT HI MDM: CPT

## 2023-10-20 PROCEDURE — 71045 X-RAY EXAM CHEST 1 VIEW: CPT

## 2023-10-20 PROCEDURE — 93005 ELECTROCARDIOGRAM TRACING: CPT

## 2023-10-20 PROCEDURE — 94640 AIRWAY INHALATION TREATMENT: CPT

## 2023-10-20 PROCEDURE — 99284 EMERGENCY DEPT VISIT MOD MDM: CPT | Performed by: EMERGENCY MEDICINE

## 2023-10-20 RX ORDER — ALBUTEROL SULFATE 90 UG/1
2 AEROSOL, METERED RESPIRATORY (INHALATION) ONCE
Status: COMPLETED | OUTPATIENT
Start: 2023-10-20 | End: 2023-10-20

## 2023-10-20 RX ORDER — KETOROLAC TROMETHAMINE 30 MG/ML
15 INJECTION, SOLUTION INTRAMUSCULAR; INTRAVENOUS ONCE
Status: COMPLETED | OUTPATIENT
Start: 2023-10-20 | End: 2023-10-20

## 2023-10-20 RX ORDER — POTASSIUM CHLORIDE 750 MG/1
40 TABLET, EXTENDED RELEASE ORAL ONCE
Status: COMPLETED | OUTPATIENT
Start: 2023-10-20 | End: 2023-10-20

## 2023-10-20 RX ORDER — IPRATROPIUM BROMIDE AND ALBUTEROL SULFATE 2.5; .5 MG/3ML; MG/3ML
3 SOLUTION RESPIRATORY (INHALATION) ONCE
Status: COMPLETED | OUTPATIENT
Start: 2023-10-20 | End: 2023-10-20

## 2023-10-20 RX ADMIN — ALBUTEROL SULFATE 2 PUFF: 90 AEROSOL, METERED RESPIRATORY (INHALATION) at 03:55

## 2023-10-20 RX ADMIN — IPRATROPIUM BROMIDE AND ALBUTEROL SULFATE 3 ML: 2.5; .5 SOLUTION RESPIRATORY (INHALATION) at 03:00

## 2023-10-20 RX ADMIN — POTASSIUM CHLORIDE 40 MEQ: 750 TABLET, EXTENDED RELEASE ORAL at 03:54

## 2023-10-20 RX ADMIN — KETOROLAC TROMETHAMINE 15 MG: 30 INJECTION, SOLUTION INTRAMUSCULAR; INTRAVENOUS at 03:55

## 2023-10-20 NOTE — ED PROVIDER NOTES
History  Chief Complaint   Patient presents with    Shortness of Breath     Pt came in complaining of shortness of breath that started two hours ago. No medications pta    Ankle Pain     Pt has chronic ankle pain and states it became worse yesterday due to the cold. Pt also has an ulcer that he injects drugs into      46-year-old gentleman presents with multiple complaints. He states that he is having ankle pain which she reports is chronic in nature but worse over the past several days given the colder weather. He denies any new injury or trauma and denies any numbness, weakness, tingling. He has taken no medications for this pain. The patient continues to have chronic leg wound. Review of the medical records indicates that he was admitted almost 3 weeks ago for this issue and was discharged on a course of Augmentin. The wound has improved since that time he denies any active drainage. He has mild localized tenderness which is also improved. Patient also reports that he is having mild shortness of breath which she relates to his asthma. He has had a dry cough and wheezing. He has taken no medications for his breathing and denies any chest pain. Shortness of Breath  Severity:  Moderate  Onset quality:  Gradual  Duration: hours. Progression:  Waxing and waning  Chronicity:  Recurrent  Relieved by:  Nothing  Worsened by:  Nothing  Ineffective treatments:  None tried  Associated symptoms: cough and wheezing    Associated symptoms: no abdominal pain, no chest pain, no diaphoresis, no fever, no headaches, no sore throat and no vomiting    Ankle Pain  Associated symptoms: no fever        Prior to Admission Medications   Prescriptions Last Dose Informant Patient Reported?  Taking?   acetaminophen (TYLENOL) 650 mg CR tablet   No No   Sig: Take 1 tablet (650 mg total) by mouth every 8 (eight) hours as needed for mild pain   ibuprofen (MOTRIN) 400 mg tablet   No No   Sig: Take 1 tablet (400 mg total) by mouth every 6 (six) hours as needed for mild pain   ondansetron (ZOFRAN-ODT) 4 mg disintegrating tablet   No No   Sig: Take 1 tablet (4 mg total) by mouth every 6 (six) hours as needed for vomiting for up to 8 doses      Facility-Administered Medications: None       Past Medical History:   Diagnosis Date    Addiction to drug ([address] [facility])     Anxiety     Depression     Drug abuse ([address] [facility])     Hepatitis C virus     Psychiatric illness     Sleep apnea        Past Surgical History:   Procedure Laterality Date    CYST REMOVAL      ELBOW SURGERY         History reviewed. No pertinent family history. I have reviewed and agree with the history as documented. E-Cigarette/Vaping    E-Cigarette Use Never User      E-Cigarette/Vaping Substances    Nicotine No     THC No     CBD No     Flavoring No     Other No     Unknown No      Social History     Tobacco Use    Smoking status: Every Day     Packs/day: 1.00     Types: Cigarettes    Smokeless tobacco: Never   Vaping Use    Vaping Use: Never used   Substance Use Topics    Alcohol use: Not Currently    Drug use: Yes     Types: Heroin, Cocaine, Marijuana     Comment: iv heroin and cocaine, smokes k2 also, all daily       Review of Systems   Constitutional:  Negative for diaphoresis and fever. HENT:  Negative for sore throat. Respiratory:  Positive for cough, shortness of breath and wheezing. Cardiovascular:  Negative for chest pain. Gastrointestinal:  Negative for abdominal pain and vomiting. Musculoskeletal:  Positive for arthralgias. Skin:  Positive for wound. Neurological:  Negative for headaches. All other systems reviewed and are negative. Physical Exam  Physical Exam  Vitals and nursing note reviewed. Constitutional:       General: He is not in acute distress. Appearance: Normal appearance. He is well-developed. He is not ill-appearing, toxic-appearing or diaphoretic. HENT:      Head: Normocephalic and atraumatic.       Right Ear: External ear normal. Left Ear: External ear normal.      Nose: Nose normal.      Mouth/Throat:      Mouth: Mucous membranes are moist.      Pharynx: Oropharynx is clear. Eyes:      Conjunctiva/sclera: Conjunctivae normal.      Pupils: Pupils are equal, round, and reactive to light. Cardiovascular:      Rate and Rhythm: Normal rate and regular rhythm. Heart sounds: Normal heart sounds. Pulmonary:      Effort: Pulmonary effort is normal. No respiratory distress. Breath sounds: Normal breath sounds. Abdominal:      General: Bowel sounds are normal. There is no distension. Palpations: Abdomen is soft. Tenderness: There is no abdominal tenderness. There is no guarding. Musculoskeletal:         General: Normal range of motion. Cervical back: Neck supple. No rigidity. Right lower leg: No edema. Left lower leg: No edema. Legs:    Skin:     General: Skin is warm and dry. Capillary Refill: Capillary refill takes less than 2 seconds. Neurological:      General: No focal deficit present. Mental Status: He is alert and oriented to person, place, and time. Sensory: No sensory deficit. Motor: No weakness.    Psychiatric:         Mood and Affect: Mood normal.         Behavior: Behavior normal.         Vital Signs  ED Triage Vitals [10/20/23 0253]   Temperature Pulse Respirations Blood Pressure SpO2   98.1 °F (36.7 °C) 94 16 (!) 150/103 98 %      Temp Source Heart Rate Source Patient Position - Orthostatic VS BP Location FiO2 (%)   Oral Monitor Lying Left arm --      Pain Score       --           Vitals:    10/20/23 0253 10/20/23 0351   BP: (!) 150/103 118/84   Pulse: 94    Patient Position - Orthostatic VS: Lying          Visual Acuity      ED Medications  Medications   potassium chloride (K-DUR,KLOR-CON) CR tablet 40 mEq (has no administration in time range)   albuterol (PROVENTIL HFA,VENTOLIN HFA) inhaler 2 puff (has no administration in time range)   ketorolac (TORADOL) injection 15 mg (has no administration in time range)   ipratropium-albuterol (DUO-NEB) 0.5-2.5 mg/3 mL inhalation solution 3 mL (3 mL Nebulization Given 10/20/23 0300)       Diagnostic Studies  Results Reviewed       Procedure Component Value Units Date/Time    Comprehensive metabolic panel [[id number]]  (Abnormal) Collected: 10/20/23 0314    Lab Status: Final result Specimen: Blood from Line, Venous Updated: 10/20/23 0340     Sodium 138 mmol/L      Potassium 3.1 mmol/L      Chloride 100 mmol/L      CO2 31 mmol/L      ANION GAP 7 mmol/L      BUN 10 mg/dL      Creatinine 0.86 mg/dL      Glucose 91 mg/dL      Calcium 8.7 mg/dL      AST 22 U/L      ALT 20 U/L      Alkaline Phosphatase 47 U/L      Total Protein 8.0 g/dL      Albumin 3.8 g/dL      Total Bilirubin 1.14 mg/dL      eGFR 100 ml/min/1.73sq m     Narrative:      National Kidney Disease Foundation guidelines for Chronic Kidney Disease (CKD):     Stage 1 with normal or high GFR (GFR > 90 mL/min/1.73 square meters)    Stage 2 Mild CKD (GFR = 60-89 mL/min/1.73 square meters)    Stage 3A Moderate CKD (GFR = 45-59 mL/min/1.73 square meters)    Stage 3B Moderate CKD (GFR = 30-44 mL/min/1.73 square meters)    Stage 4 Severe CKD (GFR = 15-29 mL/min/1.73 square meters)    Stage 5 End Stage CKD (GFR <15 mL/min/1.73 square meters)  Note: GFR calculation is accurate only with a steady state creatinine    CBC and differential [[id number]] Collected: 10/20/23 0314    Lab Status: Final result Specimen: Blood from Line, Venous Updated: 10/20/23 0322     WBC 6.71 Thousand/uL      RBC 4.47 Million/uL      Hemoglobin 14.0 g/dL      Hematocrit 43.3 %      MCV 97 fL      MCH 31.3 pg      MCHC 32.3 g/dL      RDW 13.0 %      MPV 9.6 fL      Platelets 480 Thousands/uL      nRBC 0 /100 WBCs      Neutrophils Relative 53 %      Immat GRANS % 0 %      Lymphocytes Relative 36 %      Monocytes Relative 9 %      Eosinophils Relative 2 %      Basophils Relative 0 %      Neutrophils Absolute 3.53 Thousands/µL      Immature Grans Absolute 0.01 Thousand/uL      Lymphocytes Absolute 2.38 Thousands/µL      Monocytes Absolute 0.63 Thousand/µL      Eosinophils Absolute 0.14 Thousand/µL      Basophils Absolute 0.02 Thousands/µL     UA (URINE) with reflex to Scope [[id number]]     Lab Status: No result Specimen: Urine                    XR chest 1 view portable   ED Interpretation by Angeline Camejo DO (10/20 9504)   No acute findings                 Procedures  ECG 12 Lead Documentation Only    Date/Time: 10/20/2023 2:57 AM    Performed by: Angeline Camejo DO  Authorized by: Angeline Camejo DO    ECG reviewed by me, the ED Provider: yes    Patient location:  ED  Rate:     ECG rate:  81    ECG rate assessment: normal    Rhythm:     Rhythm: sinus rhythm    Ectopy:     Ectopy: none    QRS:     QRS axis:  Normal  Conduction:     Conduction: normal    ST segments:     ST segments:  Normal  T waves:     T waves: non-specific             ED Course                               SBIRT 20yo+      Flowsheet Row Most Recent Value   Initial Alcohol Screen: US AUDIT-C     1. How often do you have a drink containing alcohol? 0 Filed at: 10/20/2023 0249   2. How many drinks containing alcohol do you have on a typical day you are drinking? 0 Filed at: 10/20/2023 0249   3a. Male UNDER 65: How often do you have five or more drinks on one occasion? 0 Filed at: 10/20/2023 0249   Audit-C Score 0 Filed at: 10/20/2023 3198   KATIE: How many times in the past year have you. .. Used an illegal drug or used a prescription medication for non-medical reasons? Daily or Almost Daily Filed at: 10/20/2023 0249   DAST-10: In the past 12 months. ..    1. Have you used drugs other than those required for medical reasons? 1 Filed at: 10/20/2023 0249   2. Do you use more than one drug at a time? 0 Filed at: 10/20/2023 0249   3. Have you had medical problems as a result of your drug use (e.g., memory loss, hepatitis, convulsions, bleeding, etc.)?  1 Filed at: 10/20/2023 0249   4. Have you had "blackouts" or "flashbacks" as a result of drug use? YesNo 0 Filed at: 10/20/2023 0249   5. Do you ever feel bad or guilty about your drug use? 0 Filed at: 10/20/2023 0249   6. Does your spouse (or parent) ever complain about your involvement with drugs? 0 Filed at: 10/20/2023 0249   7. Have you neglected your family because of your use of drugs? 1 Filed at: 10/20/2023 0249   8. Have you engaged in illegal activities in order to obtain drugs? 0 Filed at: 10/20/2023 0249   9. Have you ever experienced withdrawal symptoms (felt sick) when you stopped taking drugs? 1 Filed at: 10/20/2023 0249   10. Are you always able to stop using drugs when you want to? 1 Filed at: 10/20/2023 0249   DAST-10 Score 5 Filed at: 10/20/2023 0249                      Medical Decision Making  72-year-old gentleman presents with complaint of chronic ankle pain, chronic wound to his leg, and mild shortness of breath. On exam he is in no acute distress with stable vital signs. Laboratory studies showed mild hypokalemia only which was treated in the ER. Patient's shortness of breath resolved with a breathing treatment. Chest x-ray shows no evidence of pneumonia. Discussed need for follow-up along with expected clinical course. He is aware the need to follow-up with wound care and with his primary care provider. Amount and/or Complexity of Data Reviewed  Labs: ordered. Radiology: ordered and independent interpretation performed. Risk  Prescription drug management. Disposition  Final diagnoses: Ankle pain   Dyspnea   Chronic wound   Hypokalemia     Time reflects when diagnosis was documented in both MDM as applicable and the Disposition within this note       Time User Action Codes Description Comment    10/20/2023  3:34 AM Keila Willis Add [M25.579] Ankle pain     10/20/2023  3:34 AM Keila Centenof Add [R06.00] Dyspnea     10/20/2023  3:34 AM Kristi Washington. Erin Maxwell Chronic wound     10/20/2023  3:42 AM Maynor Oats Add [E87.6] Hypokalemia           ED Disposition       ED Disposition   Discharge    Condition   Stable    Date/Time   Fri Oct 20, 2023 [address] discharge to home/self care. Follow-up Information       Follow up With Specialties Details Why Contact Info    Chris Larson MD [facility] Medicine Call   [address] [facility] [place]      Ivone Montez DPM Podiatry, [facility]  For wound re-check [address]  [phone]              Patient's Medications   Discharge Prescriptions    No medications on file       No discharge procedures on file.     PDMP Review         Value Time User    PDMP Reviewed  Yes 10/5/2023  9:37 AM Toya Lozano PA-C            ED Provider  Electronically Signed by             Gopi Tay DO  10/20/23 4423

## 2023-10-22 ENCOUNTER — HOSPITAL ENCOUNTER (EMERGENCY)
Facility: HOSPITAL | Age: 53
Discharge: HOME | End: 2023-10-22
Attending: INTERNAL MEDICINE
Payer: COMMERCIAL

## 2023-10-22 VITALS
HEART RATE: 81 BPM | SYSTOLIC BLOOD PRESSURE: 136 MMHG | OXYGEN SATURATION: 95 % | RESPIRATION RATE: 20 BRPM | DIASTOLIC BLOOD PRESSURE: 81 MMHG | TEMPERATURE: 98.3 F | BODY MASS INDEX: 27.89 KG/M2 | WEIGHT: 167.6 LBS

## 2023-10-22 DIAGNOSIS — F11.93 OPIATE WITHDRAWAL (HCC): Primary | ICD-10-CM

## 2023-10-22 DIAGNOSIS — F11.20 OPIATE DEPENDENCE (HCC): ICD-10-CM

## 2023-10-22 PROCEDURE — 99284 EMERGENCY DEPT VISIT MOD MDM: CPT

## 2023-10-22 PROCEDURE — 99284 EMERGENCY DEPT VISIT MOD MDM: CPT | Performed by: INTERNAL MEDICINE

## 2023-10-22 NOTE — ED PROVIDER NOTES
History  Chief Complaint   Patient presents with    Opiate Withdrawal     States he relapsed on heroin and cocaine 3 days ago. Injects 6-7 bags daily. Currently presents with shakes and diarrhea. HPI  56-year-old man presents to ED for evaluation of opioid withdrawal.  Patient reports he has been using IV heroin along with cocaine. He states he last used yesterday. States he injects 6-7 bags of heroin daily. He reports variable cocaine use. He states he would like to go to detox. He states he is not interested in rehab "because rehab does not help."  Patient reports he feels unwell, has the shakes and diarrhea. Patient denies headache, visual changes, dizziness, fevers, chills, chest pain, palpitations, abdominal pain. Prior to Admission Medications   Prescriptions Last Dose Informant Patient Reported? Taking?   acetaminophen (TYLENOL) 650 mg CR tablet   No No   Sig: Take 1 tablet (650 mg total) by mouth every 8 (eight) hours as needed for mild pain   ibuprofen (MOTRIN) 400 mg tablet   No No   Sig: Take 1 tablet (400 mg total) by mouth every 6 (six) hours as needed for mild pain   ondansetron (ZOFRAN-ODT) 4 mg disintegrating tablet   No No   Sig: Take 1 tablet (4 mg total) by mouth every 6 (six) hours as needed for vomiting for up to 8 doses      Facility-Administered Medications: None       Past Medical History:   Diagnosis Date    Addiction to drug ([address] [facility])     Anxiety     Depression     Drug abuse ([address] [facility])     Hepatitis C virus     Psychiatric illness     Sleep apnea        Past Surgical History:   Procedure Laterality Date    CYST REMOVAL      ELBOW SURGERY         History reviewed. No pertinent family history. I have reviewed and agree with the history as documented.     E-Cigarette/Vaping    E-Cigarette Use Never User      E-Cigarette/Vaping Substances    Nicotine No     THC No     CBD No     Flavoring No     Other No     Unknown No      Social History     Tobacco Use    Smoking status: Every Day Packs/day: 1.00     Types: Cigarettes    Smokeless tobacco: Never   Vaping Use    Vaping Use: Never used   Substance Use Topics    Alcohol use: Not Currently    Drug use: Yes     Types: Heroin, Cocaine, Marijuana     Comment: iv heroin and cocaine, smokes k2 also, all daily       Review of Systems   All other systems reviewed and are negative. Physical Exam  Physical Exam  PHYSICAL EXAM    Constitutional:  Well developed, no acute distress, disheveled  HEENT:  Conjunctiva normal. Oropharynx moist  Respiratory:  No respiratory distress  Cardiovascular:  Normal rate  GI:  Soft, nondistended, nontender  :  No costovertebral angle tenderness   Musculoskeletal:  No edema, no tenderness, no deformities  Integument:  Well hydrated, no rash   Lymphatic:  No lymphadenopathy noted   Neurologic:  Alert & oriented x 3, normal motor function, no focal deficits noted   Psychiatric:  Speech and behavior appropriate       Vital Signs  ED Triage Vitals [10/22/23 0417]   Temperature Pulse Respirations Blood Pressure SpO2   98.3 °F (36.8 °C) 81 20 136/81 95 %      Temp Source Heart Rate Source Patient Position - Orthostatic VS BP Location FiO2 (%)   Oral Monitor Sitting Left arm --      Pain Score       --           Vitals:    10/22/23 0417   BP: 136/81   Pulse: 81   Patient Position - Orthostatic VS: Sitting         Visual Acuity      ED Medications  Medications - No data to display    Diagnostic Studies  Results Reviewed       None                   No orders to display              Procedures  Procedures         ED Course  ED Course as of 10/22/23 0435   Sun Oct 22, 2023   0423 Discussed patient with detox, he is not a candidate.   Patient awaiting host                                             Medical Decision Making  Problems Addressed:  Opiate dependence [facility]): chronic illness or injury  Opiate withdrawal ([address]): acute illness or injury    Amount and/or Complexity of Data Reviewed  External Data Reviewed: labs and notes.    Risk  Diagnosis or treatment significantly limited by social determinants of health. Disposition  Final diagnoses:   Opiate withdrawal ([address])   Opiate dependence ([address])     Time reflects when diagnosis was documented in both MDM as applicable and the Disposition within this note       Time User Action Codes Description Comment    10/22/2023  4:28 AM Geoffrey Quick [F11.93] Opiate withdrawal ([address])     10/22/2023  4:28 AM Geoffrey Quick [F11.20] Opiate dependence [facility])           ED Disposition       ED Disposition   Left from Room after Provider Exam    Condition   --    Date/Time   Sun Oct 22, 2023  4:27 AM    Comment   --             Follow-up Information    None         Discharge Medication List as of 10/22/2023  4:28 AM        CONTINUE these medications which have NOT CHANGED    Details   acetaminophen (TYLENOL) 650 mg CR tablet Take 1 tablet (650 mg total) by mouth every 8 (eight) hours as needed for mild pain, Starting Wed 10/11/2023, Normal      ibuprofen (MOTRIN) 400 mg tablet Take 1 tablet (400 mg total) by mouth every 6 (six) hours as needed for mild pain, Starting Wed 10/11/2023, Normal      ondansetron (ZOFRAN-ODT) 4 mg disintegrating tablet Take 1 tablet (4 mg total) by mouth every 6 (six) hours as needed for vomiting for up to 8 doses, Starting Wed 10/11/2023, Normal             No discharge procedures on file.     PDMP Review         Value Time User    PDMP Reviewed  Yes 10/5/2023  9:37 AM Danay Roe PA-C            ED Provider  Electronically Signed by             Geoffrey Coughlin MD  10/22/23 2981

## 2023-10-23 ENCOUNTER — HOSPITAL ENCOUNTER (EMERGENCY)
Facility: HOSPITAL | Age: 53
Discharge: HOME | End: 2023-10-23
Attending: EMERGENCY MEDICINE
Payer: COMMERCIAL

## 2023-10-23 VITALS
RESPIRATION RATE: 18 BRPM | OXYGEN SATURATION: 100 % | HEART RATE: 79 BPM | TEMPERATURE: 98.9 F | SYSTOLIC BLOOD PRESSURE: 142 MMHG | DIASTOLIC BLOOD PRESSURE: 95 MMHG

## 2023-10-23 DIAGNOSIS — Z53.21 ELOPED FROM EMERGENCY DEPARTMENT: ICD-10-CM

## 2023-10-23 DIAGNOSIS — F19.10 POLYSUBSTANCE ABUSE (HCC): Primary | ICD-10-CM

## 2023-10-23 PROCEDURE — 99283 EMERGENCY DEPT VISIT LOW MDM: CPT

## 2023-10-23 PROCEDURE — 99285 EMERGENCY DEPT VISIT HI MDM: CPT

## 2023-10-23 NOTE — ED PROVIDER NOTES
History  Chief Complaint   Patient presents with    Detox Evaluation     Was seen yesterday for same and left. States he smokes 10-12 bags of heroin a day, also smokes K2 and cocaine daily. Was on detox unit and received sublocade on 85.      56-year-old male past medical history of opiate abuse, hepatitis C, psychiatric disorder presents to the emergency department requesting detox from heroin. States that he relapsed 4 days ago and has been using a bundle of day. He states he last used around 6 or 7 PM last night which was 10-11 hours prior to arrival and that he woke up with chills, nausea, abdominal cramps, myalgias-denies other acute complaints. He also reports K2 and cocaine use but none since yesterday. No SI or HI. Patient seen for same complaint 1 day ago, was discussed with detox is not a candidate, he received sublocade injection after detox stay 18 days ago. host was recommended. Patient left after provider exam.       History provided by:  Patient and medical records  Detox Evaluation  Associated symptoms: abdominal pain and nausea    Associated symptoms: no seizures, no shortness of breath, no suicidal ideation, no vomiting and no weakness        Prior to Admission Medications   Prescriptions Last Dose Informant Patient Reported?  Taking?   acetaminophen (TYLENOL) 650 mg CR tablet   No No   Sig: Take 1 tablet (650 mg total) by mouth every 8 (eight) hours as needed for mild pain   ibuprofen (MOTRIN) 400 mg tablet   No No   Sig: Take 1 tablet (400 mg total) by mouth every 6 (six) hours as needed for mild pain   ondansetron (ZOFRAN-ODT) 4 mg disintegrating tablet   No No   Sig: Take 1 tablet (4 mg total) by mouth every 6 (six) hours as needed for vomiting for up to 8 doses      Facility-Administered Medications: None       Past Medical History:   Diagnosis Date    Addiction to drug ([facility])     Anxiety     Depression     Drug abuse ([facility])     Hepatitis C virus     Psychiatric illness     Sleep apnea Past Surgical History:   Procedure Laterality Date    CYST REMOVAL      ELBOW SURGERY         History reviewed. No pertinent family history. I have reviewed and agree with the history as documented. E-Cigarette/Vaping    E-Cigarette Use Never User      E-Cigarette/Vaping Substances    Nicotine No     THC No     CBD No     Flavoring No     Other No     Unknown No      Social History     Tobacco Use    Smoking status: Every Day     Packs/day: 1.00     Types: Cigarettes    Smokeless tobacco: Never   Vaping Use    Vaping Use: Never used   Substance Use Topics    Alcohol use: Not Currently    Drug use: Yes     Types: Heroin, Cocaine, Marijuana     Comment: iv heroin and cocaine, smokes k2 also, all daily       Review of Systems   Constitutional:  Positive for chills and diaphoresis. Negative for fever. Respiratory:  Negative for shortness of breath. Cardiovascular:  Negative for chest pain. Gastrointestinal:  Positive for abdominal pain and nausea. Negative for diarrhea and vomiting. Musculoskeletal:  Positive for myalgias. Negative for back pain. Neurological:  Negative for dizziness, seizures, syncope, weakness and numbness. Psychiatric/Behavioral:  Negative for suicidal ideas. All other systems reviewed and are negative. Physical Exam  Physical Exam  Vitals and nursing note reviewed. Constitutional:       General: He is not in acute distress. Appearance: Normal appearance. He is not ill-appearing, toxic-appearing or diaphoretic. HENT:      Head: Normocephalic and atraumatic. Mouth/Throat:      Mouth: Mucous membranes are moist.   Eyes:      General: No scleral icterus. Pupils: Pupils are equal, round, and reactive to light. Cardiovascular:      Rate and Rhythm: Normal rate and regular rhythm. Pulmonary:      Effort: Pulmonary effort is normal. No respiratory distress. Abdominal:      General: There is no distension. Palpations: Abdomen is soft. Tenderness: There is no abdominal tenderness. Skin:     General: Skin is warm and dry. Coloration: Skin is not jaundiced. Comments: No piloerection   Neurological:      Mental Status: He is alert and oriented to person, place, and time. Gait: Gait (ambulated with steady gait) normal.         Vital Signs  ED Triage Vitals [10/23/23 0553]   Temperature Pulse Respirations Blood Pressure SpO2   98.9 °F (37.2 °C) 79 18 142/95 100 %      Temp Source Heart Rate Source Patient Position - Orthostatic VS BP Location FiO2 (%)   Tympanic Monitor Sitting Left arm --      Pain Score       --           Vitals:    10/23/23 0553   BP: 142/95   Pulse: 79   Patient Position - Orthostatic VS: Sitting         Visual Acuity      ED Medications  Medications   naloxone nasal- Given to patient by provider at discharge. (NARCAN) 4 mg/0.1 mL nasal spray 4 mg (0 mg Does not apply Hold 10/23/23 0602)       Diagnostic Studies  Results Reviewed       None                   No orders to display              Procedures  Procedures         ED Course                                             Medical Decision Making  Explained to patient that we would be putting in a warm handoff and doing medical clearance and that he was not a candidate for the [facility] inpatient detox unit as he was discharged from there 18 days ago s/p sublocade injection. I explained what the host program was and how it works. He initially stated that he was agreeable to this but that he did not want rehab because they always rejected him because of the drug use, explained to patient that her rehab stay for people with addiction to drugs. VSS. NAD. AAOx4. Ambulating with steady gait. No slurring of speech. He was agreeable to work-up, HOST referral. He then left the department without informing anyone- we were unable to give him the [facility] narcan that was ordered.               Disposition  Final diagnoses:   Polysubstance abuse ([address])   Eloped from emergency department     Time reflects when diagnosis was documented in both MDM as applicable and the Disposition within this note       Time User Action Codes Description Comment    10/23/2023  6:01 AM Melanie Quick [F19.10] Polysubstance abuse ([address])     10/23/2023  6:01 AM Melanie Quick [K51.26] Eloped from emergency department           ED Disposition       ED Disposition   Left from Room after Provider Exam    Condition   --    Date/Time   Mon Oct 23, 2023  6:01 AM    Comment   --             Follow-up Information    None         Discharge Medication List as of 10/23/2023  6:03 AM        CONTINUE these medications which have NOT CHANGED    Details   acetaminophen (TYLENOL) 650 mg CR tablet Take 1 tablet (650 mg total) by mouth every 8 (eight) hours as needed for mild pain, Starting Wed 10/11/2023, Normal      ibuprofen (MOTRIN) 400 mg tablet Take 1 tablet (400 mg total) by mouth every 6 (six) hours as needed for mild pain, Starting Wed 10/11/2023, Normal      ondansetron (ZOFRAN-ODT) 4 mg disintegrating tablet Take 1 tablet (4 mg total) by mouth every 6 (six) hours as needed for vomiting for up to 8 doses, Starting Wed 10/11/2023, Normal             No discharge procedures on file.     PDMP Review         Value Time User    PDMP Reviewed  Yes 10/5/2023  9:37 AM Deitra Goldmann, PA-C            ED Provider  Electronically Signed by             Eligio Burns PA-C  10/23/23 4629

## 2024-01-29 ENCOUNTER — HOSPITAL ENCOUNTER (EMERGENCY)
Facility: HOSPITAL | Age: 54
Discharge: HOME | End: 2024-01-29
Attending: EMERGENCY MEDICINE
Payer: COMMERCIAL

## 2024-01-29 VITALS
TEMPERATURE: 99.4 F | WEIGHT: 173 LBS | BODY MASS INDEX: 28.79 KG/M2 | OXYGEN SATURATION: 98 % | HEART RATE: 98 BPM | RESPIRATION RATE: 18 BRPM | SYSTOLIC BLOOD PRESSURE: 132 MMHG | DIASTOLIC BLOOD PRESSURE: 86 MMHG

## 2024-01-29 DIAGNOSIS — F19.10 SUBSTANCE ABUSE (HCC): Chronic | ICD-10-CM

## 2024-01-29 DIAGNOSIS — Z76.0 ENCOUNTER FOR MEDICATION REFILL: Primary | ICD-10-CM

## 2024-01-29 PROCEDURE — 99282 EMERGENCY DEPT VISIT SF MDM: CPT

## 2024-01-29 PROCEDURE — 99284 EMERGENCY DEPT VISIT MOD MDM: CPT | Performed by: EMERGENCY MEDICINE

## 2024-01-29 RX ORDER — BUPRENORPHINE AND NALOXONE 8; 2 MG/1; MG/1
8 FILM, SOLUBLE BUCCAL; SUBLINGUAL DAILY
Qty: 5 FILM | Refills: 0 | Status: SHIPPED | OUTPATIENT
Start: 2024-01-29 | End: 2024-02-03

## 2024-01-29 RX ORDER — BUPRENORPHINE AND NALOXONE 8; 2 MG/1; MG/1
8 FILM, SOLUBLE BUCCAL; SUBLINGUAL DAILY
Status: DISCONTINUED | OUTPATIENT
Start: 2024-01-29 | End: 2024-01-29 | Stop reason: HOSPADM

## 2024-01-29 RX ADMIN — BUPRENORPHINE AND NALOXONE 8 MG: 8; 2 FILM BUCCAL; SUBLINGUAL at 12:24

## 2024-01-29 NOTE — ED PROVIDER NOTES
"History  Chief Complaint   Patient presents with    Medication Problem     \"I need my sublicade shot. It's been 30 days.\"     HPI  Patient is a 53-year-old male presenting with request for Sublocade shot.  States that he has been getting it monthly however has not been able to schedule appointment at his usual side due to the distance.  According to patient it takes 2 hours to get to the site.  Has been clean for the past 4 months.  Denies any withdrawal symptoms.  Reports no other complaints.  Prior to Admission Medications   Prescriptions Last Dose Informant Patient Reported? Taking?   acetaminophen (TYLENOL) 650 mg CR tablet   No No   Sig: Take 1 tablet (650 mg total) by mouth every 8 (eight) hours as needed for mild pain   ibuprofen (MOTRIN) 400 mg tablet   No No   Sig: Take 1 tablet (400 mg total) by mouth every 6 (six) hours as needed for mild pain   ondansetron (ZOFRAN-ODT) 4 mg disintegrating tablet   No No   Sig: Take 1 tablet (4 mg total) by mouth every 6 (six) hours as needed for vomiting for up to 8 doses      Facility-Administered Medications: None       Past Medical History:   Diagnosis Date    Addiction to drug (HCC)     Anxiety     Depression     Drug abuse (HCC)     Hepatitis C virus     Psychiatric illness     Sleep apnea        Past Surgical History:   Procedure Laterality Date    CYST REMOVAL      ELBOW SURGERY         History reviewed. No pertinent family history.  I have reviewed and agree with the history as documented.    E-Cigarette/Vaping    E-Cigarette Use Never User      E-Cigarette/Vaping Substances    Nicotine No     THC No     CBD No     Flavoring No     Other No     Unknown No      Social History     Tobacco Use    Smoking status: Every Day     Current packs/day: 1.00     Types: Cigarettes    Smokeless tobacco: Never   Vaping Use    Vaping status: Never Used   Substance Use Topics    Alcohol use: Not Currently    Drug use: Not Currently     Types: Heroin, Cocaine, Marijuana     " Comment: iv heroin and cocaine, smokes k2 also, all daily       Review of Systems   Constitutional:  Negative for chills, diaphoresis, fever and unexpected weight change.   HENT:  Negative for ear pain and sore throat.    Eyes:  Negative for visual disturbance.   Respiratory:  Negative for cough, chest tightness and shortness of breath.    Cardiovascular:  Negative for chest pain and leg swelling.   Gastrointestinal:  Negative for abdominal distention, abdominal pain, constipation, diarrhea, nausea and vomiting.   Endocrine: Negative.    Genitourinary:  Negative for difficulty urinating and dysuria.   Musculoskeletal: Negative.    Skin: Negative.    Allergic/Immunologic: Negative.    Neurological: Negative.    Hematological: Negative.    Psychiatric/Behavioral: Negative.     All other systems reviewed and are negative.      Physical Exam  Physical Exam  Vitals and nursing note reviewed.   Constitutional:       General: He is not in acute distress.     Appearance: Normal appearance. He is not ill-appearing.   HENT:      Head: Normocephalic and atraumatic.      Right Ear: External ear normal.      Left Ear: External ear normal.      Nose: Nose normal.      Mouth/Throat:      Mouth: Mucous membranes are moist.      Pharynx: Oropharynx is clear.   Eyes:      General: No scleral icterus.        Right eye: No discharge.         Left eye: No discharge.      Extraocular Movements: Extraocular movements intact.      Conjunctiva/sclera: Conjunctivae normal.      Pupils: Pupils are equal, round, and reactive to light.   Cardiovascular:      Rate and Rhythm: Normal rate and regular rhythm.      Pulses: Normal pulses.      Heart sounds: Normal heart sounds.   Pulmonary:      Effort: Pulmonary effort is normal.      Breath sounds: Normal breath sounds.   Abdominal:      General: Abdomen is flat. Bowel sounds are normal. There is no distension.      Palpations: Abdomen is soft.      Tenderness: There is no abdominal tenderness.  There is no guarding or rebound.   Musculoskeletal:         General: Normal range of motion.      Cervical back: Normal range of motion and neck supple.   Skin:     General: Skin is warm and dry.      Capillary Refill: Capillary refill takes less than 2 seconds.   Neurological:      General: No focal deficit present.      Mental Status: He is alert and oriented to person, place, and time. Mental status is at baseline.   Psychiatric:         Mood and Affect: Mood normal.         Behavior: Behavior normal.         Thought Content: Thought content normal.         Judgment: Judgment normal.         Vital Signs  ED Triage Vitals [01/29/24 1127]   Temperature Pulse Respirations Blood Pressure SpO2   99.4 °F (37.4 °C) 98 18 132/86 98 %      Temp Source Heart Rate Source Patient Position - Orthostatic VS BP Location FiO2 (%)   Tympanic Monitor Sitting Left arm --      Pain Score       No Pain           Vitals:    01/29/24 1127   BP: 132/86   Pulse: 98   Patient Position - Orthostatic VS: Sitting         Visual Acuity      ED Medications  Medications   buprenorphine-naloxone (Suboxone) film 8 mg (8 mg Sublingual Given 1/29/24 1224)       Diagnostic Studies  Results Reviewed       None                   No orders to display              Procedures  Procedures         ED Course                               SBIRT 22yo+      Flowsheet Row Most Recent Value   Initial Alcohol Screen: US AUDIT-C     1. How often do you have a drink containing alcohol? 0 Filed at: 01/29/2024 1139   2. How many drinks containing alcohol do you have on a typical day you are drinking?  0 Filed at: 01/29/2024 1139   3a. Male UNDER 65: How often do you have five or more drinks on one occasion? 0 Filed at: 01/29/2024 1139   Audit-C Score 0 Filed at: 01/29/2024 1139   KATIE: How many times in the past year have you...    Used an illegal drug or used a prescription medication for non-medical reasons? Never Filed at: 01/29/2024 1139                       Medical Decision Making  53-year-old male presenting with request for Sublocade shot  Consulted detox and was notified that Sublocade shots are typically given in the detox unit.  Recommends that patient to be discharged with Suboxone prescription and outpatient Sublocade shot.  Patient agrees with plan  Patient is asymptomatic and no signs of withdrawal or acute drug intoxication.  Return precautions provided    Problems Addressed:  Encounter for medication refill: acute illness or injury  Substance abuse (HCC): acute illness or injury    Risk  Prescription drug management.             Disposition  Final diagnoses:   Encounter for medication refill   Substance abuse (HCC)     Time reflects when diagnosis was documented in both MDM as applicable and the Disposition within this note       Time User Action Codes Description Comment    1/29/2024 12:16 PM Yaw Best [Z76.0] Encounter for medication refill     1/29/2024 12:17 PM Yaw Bets [F19.10] Substance abuse (HCC)           ED Disposition       ED Disposition   Discharge    Condition   Stable    Date/Time   Mon Jan 29, 2024 1216    Comment   Junior Torres discharge to home/self care.                   Follow-up Information       Follow up With Specialties Details Why Contact Info    Aimee Bates MD Family Medicine   [address] [place] [id number]  [phone]              Discharge Medication List as of 1/29/2024 12:17 PM        START taking these medications    Details   buprenorphine-naloxone (Suboxone) 8-2 mg Place 1 Film (8 mg total) under the tongue daily for 5 days, Starting Mon 1/29/2024, Until Sat 2/3/2024, Normal           CONTINUE these medications which have NOT CHANGED    Details   acetaminophen (TYLENOL) 650 mg CR tablet Take 1 tablet (650 mg total) by mouth every 8 (eight) hours as needed for mild pain, Starting Wed 10/11/2023, Normal      ibuprofen (MOTRIN) 400 mg tablet Take 1 tablet (400 mg  total) by mouth every 6 (six) hours as needed for mild pain, Starting Wed 10/11/2023, Normal      ondansetron (ZOFRAN-ODT) 4 mg disintegrating tablet Take 1 tablet (4 mg total) by mouth every 6 (six) hours as needed for vomiting for up to 8 doses, Starting Wed 10/11/2023, Normal             No discharge procedures on file.    PDMP Review         Value Time User    PDMP Reviewed  Yes 10/5/2023  9:37 AM Jennifer Bush PA-C            ED Provider  Electronically Signed by             Yaw Best MD  01/29/24 3423

## 2024-03-27 NOTE — ASSESSMENT & PLAN NOTE
Patient reports recent heroin & K2 usage, reports last usage was in the am 9/21/2019  Patient is clinically improving with no body aches and diaphoresis noted today    Patient states he has tried multiple rehabs in the past and states that they do not help him  He feels the only thing that helps him is being regularly compliant with going to [facility]  He also looks forward to going to live with his brother in the [facility] and states that he must be clean of drugs before he can go    He has refused any rehab services at this time stretcher

## 2024-06-15 NOTE — DISCHARGE SUMMARY
Discharge- Leathabradley Najerabeth 1970, 54 y o  male MRN: [id number]    Unit/Bed#: 5T -01 Encounter: [id number]    Primary Care Provider: More Vickers MD   Date and time admitted to hospital: 9/22/2019  7:00 PM        Sepsis [facility])  Assessment & Plan  Secondary to L forearm cellulitis & possible pneumonia  Patient also actively withdrawing from heroin so clinical picture is not definitive  No signs of end organ dysfunction & lactic acid normal     Sepsis has now resolved      * Cellulitis of left forearm  Assessment & Plan  Patient reports "feeling unwell" with erythema & tenderness of L forearm x 3 days  Patients is active IVDA with multiple injection sites noted  Plan to bib L forearm erythema margins; currently with no compartment tenderness or restrictions in ROM  LUE neurovasc intact with sensation intact all dermatomes & +2 radial pulse  clinically improving  Continue Rocephin for now until discharge  Blood cultures are negative for 24 hours  Venous Doppler left upper extremity negative for DVT  Patient noted to have a fluctuating abscess on his left forearm at the site of recent IV injection  consult placed to vascular surgery  Abscess was drained yesterday by surgery at bedside  Continue dressings  Will discharge home on a course of Bactrim and Augmentin      Pneumonia  Assessment & Plan  Patient has atypical pneumonia versus aspiration pneumonia  CT Chest reviewed  Procalcitonin level is elevated  Ceftriaxone & Azithromycin for now  Chest Physiotherapy & Pulmonary toilet  O2 NC to keep O2 sat > 92%  Xoponex nebs, as needed  Will discharge on a course of Augmentin for 5 more days    Acute drug withdrawal syndrome [facility])  Assessment & Plan  Patient reports recent heroin & K2 usage, reports last usage was in the am 9/21/2019    Patient is clinically improving with no body aches and diaphoresis noted today    Patient states he has tried multiple rehabs in the past and states that they do not help him  He feels the only thing that helps him is being regularly compliant with going to [employer]  He also looks forward to going to [facility] his brother [facility] and states that he must be clean of drugs before he can go  He has refused any rehab services at this time    Substance abuse [facility])  Assessment & Plan  Patient abuses heroin and K2  Currently going to active drug withdrawal   Continue supportive symptomatic management--    Moderate episode of recurrent major depressive disorder [facility])  Assessment & Plan  Currently on no prescription medication  He states he does suffer from mild to moderate depression but denies any homicidal or suicidal ideation  Will refer for outpatient follow-up with Psychiatry    Heroin use disorder, severe, dependence ([facility] [address] )  Assessment & Plan  --patient states that he uses 4-5 bags of heroin daily IV  He is not interested in drug rehab at this time      Discharging Physician / Practitioner: Padmini Vincent MD  PCP: More Vickers MD  Admission Date:   Admission Orders (From admission, onward)     Ordered        09/22/19 2120  Inpatient Admission (expected length of stay for this patient Order details is greater than two midnights)  Once                   Discharge Date: 09/25/19    Resolved Problems  Date Reviewed: 9/25/2019          Resolved    Lung infection 9/22/2019     Resolved by  Bhavin Contreras, [facility] Stay:  · Vascular surgery    Procedures Performed:   · Incision and drainage of abscess on the left forearm    Significant Findings / Test Results:   · Pneumonia and active drug withdrawal  Hep C  Incidental Findings:   · None     Test Results Pending at Discharge (will require follow up):    · Blood cultures     Outpatient Tests Requested:  · None    Complications:  None    Reason for Admission:  Acute drug overdose    Hospital Course:     Burak Sorto is a 54 y o  male patient who originally presented to the hospital on 9/22/2019 due to acute drug overdose with IV heroin and K2  Patient was going through active drug withdrawal also noted to have a cellulitis of his left arm with an abscess which was drained in the hospital   He was also found to have a pneumonia which is possibly an aspiration pneumonia  treated with IV antibiotics and is clinically improving  Offered inpatient drug and alcohol rehab however he refused and would like to go live with his brother in the [facility]  He is being discharged home in a stable condition advised to be abstinent from heroin and K2 use  Please see above list of diagnoses and related plan for additional information  Condition at Discharge: good     Discharge Day Visit / Exam:     Subjective:  Patient denies any chest pain or shortness of breath  He does complain of mild pain in the left hand where the abscess was drained otherwise his body aches and cold sweats are improving  Vitals: Blood Pressure: 141/90 (09/25/19 0759)  Pulse: (!) 52 (09/25/19 0759)  Temperature: 99 2 °F (37 3 °C) (09/25/19 0759)  Temp Source: Temporal (09/25/19 0759)  Respirations: 20 (09/25/19 0759)  Height: 5' 2" (157 5 cm) (09/22/19 2207)  Weight - Scale: 70 kg (154 lb 5 2 oz) (09/24/19 0600)  SpO2: 98 % (09/25/19 0759)  Exam:   Physical Exam   Constitutional: He is oriented to person, place, and time  He appears well-developed and well-nourished  HENT:   Head: Normocephalic and atraumatic  Mouth/Throat: Oropharynx is clear and moist    Eyes: Pupils are equal, round, and reactive to light  Conjunctivae and EOM are normal    Neck: Normal range of motion  Neck supple  Cardiovascular: Normal rate, regular rhythm and normal heart sounds  Pulmonary/Chest: Effort normal and breath sounds normal    Abdominal: Soft  Bowel sounds are normal  He exhibits no mass  There is no tenderness  There is no rebound and no guarding  Genitourinary:   Genitourinary Comments: deferred   Musculoskeletal: Normal range of motion  Left hand open wound with mild bleeding noted  No surrounding redness or erythema or warmth noted   Neurological: He is alert and oriented to person, place, and time  He has normal reflexes  Cranial nerves 2-12 are normal   Normal neurological exam   Skin: Skin is warm and dry  No rash noted  Psychiatric: He has a normal mood and affect  Nursing note and vitals reviewed  Discussion with Family:  None    Discharge instructions/Information to patient and family:   See after visit summary for information provided to patient and family  Provisions for Follow-Up Care:  See after visit summary for information related to follow-up care and any pertinent home health orders  Disposition:     Home    For Discharges to [facility] SNF:   · Not Applicable to this Patient - Not Applicable to this Patient    Planned Readmission:  None     Discharge Statement:  I spent 35 minutes discharging the patient  This time was spent on the day of discharge  I had direct contact with the patient on the day of discharge  Greater than 50% of the total time was spent examining patient, answering all patient questions, arranging and discussing plan of care with patient as well as directly providing post-discharge instructions  Additional time then spent on discharge activities  Discharge Medications:  See after visit summary for reconciled discharge medications provided to patient and family        ** Please Note: This note has been constructed using a voice recognition system ** No